# Patient Record
Sex: MALE | Race: WHITE | HISPANIC OR LATINO | Employment: OTHER | URBAN - METROPOLITAN AREA
[De-identification: names, ages, dates, MRNs, and addresses within clinical notes are randomized per-mention and may not be internally consistent; named-entity substitution may affect disease eponyms.]

---

## 2019-02-03 ENCOUNTER — OUTSIDE PLACE OF SERVICE (OUTPATIENT)
Dept: CARDIOLOGY | Facility: CLINIC | Age: 49
End: 2019-02-03
Payer: MEDICARE

## 2019-02-03 PROCEDURE — 93010 ELECTROCARDIOGRAM REPORT: CPT | Mod: S$GLB,,, | Performed by: INTERNAL MEDICINE

## 2019-02-03 PROCEDURE — 93010 PR ELECTROCARDIOGRAM REPORT: ICD-10-PCS | Mod: S$GLB,,, | Performed by: INTERNAL MEDICINE

## 2019-12-11 ENCOUNTER — HOSPITAL ENCOUNTER (INPATIENT)
Facility: HOSPITAL | Age: 49
LOS: 12 days | Discharge: HOME HEALTH | DRG: 637 | End: 2019-12-23
Attending: EMERGENCY MEDICINE | Admitting: ANESTHESIOLOGY
Payer: MEDICARE

## 2019-12-11 ENCOUNTER — OUTSIDE PLACE OF SERVICE (OUTPATIENT)
Dept: CARDIOLOGY | Facility: CLINIC | Age: 49
End: 2019-12-11
Payer: MEDICARE

## 2019-12-11 DIAGNOSIS — R45.1 AGITATION: ICD-10-CM

## 2019-12-11 DIAGNOSIS — I63.9 STROKE: ICD-10-CM

## 2019-12-11 DIAGNOSIS — N18.5 TYPE 2 DIABETES MELLITUS WITH STAGE 5 CHRONIC KIDNEY DISEASE NOT ON CHRONIC DIALYSIS, WITHOUT LONG-TERM CURRENT USE OF INSULIN: ICD-10-CM

## 2019-12-11 DIAGNOSIS — Z99.2 ESRD (END STAGE RENAL DISEASE) ON DIALYSIS: ICD-10-CM

## 2019-12-11 DIAGNOSIS — I10 ESSENTIAL HYPERTENSION: ICD-10-CM

## 2019-12-11 DIAGNOSIS — N18.6 ESRD (END STAGE RENAL DISEASE) ON DIALYSIS: ICD-10-CM

## 2019-12-11 DIAGNOSIS — Z78.9 IMPAIRED MOBILITY AND ADLS: ICD-10-CM

## 2019-12-11 DIAGNOSIS — E15: ICD-10-CM

## 2019-12-11 DIAGNOSIS — A49.02 MRSA (METHICILLIN RESISTANT STAPHYLOCOCCUS AUREUS) INFECTION: ICD-10-CM

## 2019-12-11 DIAGNOSIS — E87.5 HYPERKALEMIA: ICD-10-CM

## 2019-12-11 DIAGNOSIS — E11.22 TYPE 2 DIABETES MELLITUS WITH STAGE 5 CHRONIC KIDNEY DISEASE NOT ON CHRONIC DIALYSIS, WITHOUT LONG-TERM CURRENT USE OF INSULIN: ICD-10-CM

## 2019-12-11 DIAGNOSIS — Z74.09 IMPAIRED MOBILITY AND ADLS: ICD-10-CM

## 2019-12-11 DIAGNOSIS — I63.9 CEREBROVASCULAR ACCIDENT (CVA), UNSPECIFIED MECHANISM: ICD-10-CM

## 2019-12-11 DIAGNOSIS — G93.40 ACUTE ENCEPHALOPATHY: ICD-10-CM

## 2019-12-11 DIAGNOSIS — R41.82 ALTERED MENTAL STATUS: ICD-10-CM

## 2019-12-11 DIAGNOSIS — E87.20 METABOLIC ACIDOSIS: ICD-10-CM

## 2019-12-11 DIAGNOSIS — J96.01 ACUTE RESPIRATORY FAILURE WITH HYPOXIA: ICD-10-CM

## 2019-12-11 DIAGNOSIS — R41.82 ALTERED MENTAL STATUS, UNSPECIFIED ALTERED MENTAL STATUS TYPE: ICD-10-CM

## 2019-12-11 DIAGNOSIS — R40.0 SOMNOLENCE: ICD-10-CM

## 2019-12-11 DIAGNOSIS — E16.2 HYPOGLYCEMIA: Primary | ICD-10-CM

## 2019-12-11 DIAGNOSIS — R94.31 QT PROLONGATION: ICD-10-CM

## 2019-12-11 PROBLEM — E11.9 TYPE 2 DIABETES MELLITUS: Status: ACTIVE | Noted: 2019-12-11

## 2019-12-11 LAB
ABO + RH BLD: NORMAL
ABO + RH BLD: NORMAL
ALBUMIN SERPL BCP-MCNC: 3.5 G/DL (ref 3.5–5.2)
ALBUMIN SERPL BCP-MCNC: 3.9 G/DL (ref 3.5–5.2)
ALBUMIN SERPL BCP-MCNC: 4.3 G/DL (ref 3.5–5.2)
ALLENS TEST: ABNORMAL
ALLENS TEST: NORMAL
ALP SERPL-CCNC: 104 U/L (ref 55–135)
ALT SERPL W/O P-5'-P-CCNC: 17 U/L (ref 10–44)
ANION GAP SERPL CALC-SCNC: 11 MMOL/L (ref 8–16)
ANION GAP SERPL CALC-SCNC: 14 MMOL/L (ref 8–16)
ANION GAP SERPL CALC-SCNC: 15 MMOL/L (ref 8–16)
APTT BLDCRRT: 26.6 SEC (ref 21–32)
AST SERPL-CCNC: 16 U/L (ref 10–40)
BASOPHILS # BLD AUTO: 0.02 K/UL (ref 0–0.2)
BASOPHILS NFR BLD: 0.2 % (ref 0–1.9)
BILIRUB SERPL-MCNC: 0.9 MG/DL (ref 0.1–1)
BLD GP AB SCN CELLS X3 SERPL QL: NORMAL
BLD GP AB SCN CELLS X3 SERPL QL: NORMAL
BLOOD GROUP ANTIBODIES SERPL: NORMAL
BNP SERPL-MCNC: 1123 PG/ML (ref 0–99)
BUN SERPL-MCNC: 53 MG/DL (ref 6–20)
BUN SERPL-MCNC: 54 MG/DL (ref 6–20)
BUN SERPL-MCNC: 54 MG/DL (ref 6–20)
CALCIUM SERPL-MCNC: 7.8 MG/DL (ref 8.7–10.5)
CALCIUM SERPL-MCNC: 8.2 MG/DL (ref 8.7–10.5)
CALCIUM SERPL-MCNC: 8.5 MG/DL (ref 8.7–10.5)
CHLORIDE SERPL-SCNC: 102 MMOL/L (ref 95–110)
CHLORIDE SERPL-SCNC: 106 MMOL/L (ref 95–110)
CHLORIDE SERPL-SCNC: 98 MMOL/L (ref 95–110)
CHOLEST SERPL-MCNC: 141 MG/DL (ref 120–199)
CHOLEST SERPL-MCNC: 162 MG/DL (ref 120–199)
CHOLEST/HDLC SERPL: 3.4 {RATIO} (ref 2–5)
CHOLEST/HDLC SERPL: 3.4 {RATIO} (ref 2–5)
CO2 SERPL-SCNC: 22 MMOL/L (ref 23–29)
CO2 SERPL-SCNC: 23 MMOL/L (ref 23–29)
CO2 SERPL-SCNC: 25 MMOL/L (ref 23–29)
CREAT SERPL-MCNC: 10.2 MG/DL (ref 0.5–1.4)
CREAT SERPL-MCNC: 10.6 MG/DL (ref 0.5–1.4)
CREAT SERPL-MCNC: 9.9 MG/DL (ref 0.5–1.4)
DELSYS: ABNORMAL
DIFFERENTIAL METHOD: ABNORMAL
EOSINOPHIL # BLD AUTO: 0 K/UL (ref 0–0.5)
EOSINOPHIL NFR BLD: 0.2 % (ref 0–8)
ERYTHROCYTE [DISTWIDTH] IN BLOOD BY AUTOMATED COUNT: 14.2 % (ref 11.5–14.5)
ERYTHROCYTE [SEDIMENTATION RATE] IN BLOOD BY WESTERGREN METHOD: 16 MM/H
ERYTHROCYTE [SEDIMENTATION RATE] IN BLOOD BY WESTERGREN METHOD: 25 MM/H
ERYTHROCYTE [SEDIMENTATION RATE] IN BLOOD BY WESTERGREN METHOD: 25 MM/H
EST. GFR  (AFRICAN AMERICAN): 5.8 ML/MIN/1.73 M^2
EST. GFR  (AFRICAN AMERICAN): 6.1 ML/MIN/1.73 M^2
EST. GFR  (AFRICAN AMERICAN): 6.3 ML/MIN/1.73 M^2
EST. GFR  (NON AFRICAN AMERICAN): 5 ML/MIN/1.73 M^2
EST. GFR  (NON AFRICAN AMERICAN): 5.2 ML/MIN/1.73 M^2
EST. GFR  (NON AFRICAN AMERICAN): 5.4 ML/MIN/1.73 M^2
FIO2: 100
FIO2: 50
FIO2: 50
GLUCOSE SERPL-MCNC: 174 MG/DL (ref 70–110)
GLUCOSE SERPL-MCNC: 93 MG/DL (ref 70–110)
GLUCOSE SERPL-MCNC: <5 MG/DL (ref 70–110)
HCO3 UR-SCNC: 15.4 MMOL/L (ref 24–28)
HCO3 UR-SCNC: 25.3 MMOL/L (ref 24–28)
HCO3 UR-SCNC: 29.9 MMOL/L (ref 24–28)
HCO3 UR-SCNC: 30.5 MMOL/L (ref 24–28)
HCT VFR BLD AUTO: 27.5 % (ref 40–54)
HCT VFR BLD CALC: 19 %PCV (ref 36–54)
HCT VFR BLD CALC: 26 %PCV (ref 36–54)
HCT VFR BLD CALC: 27 %PCV (ref 36–54)
HCT VFR BLD CALC: 29 %PCV (ref 36–54)
HDLC SERPL-MCNC: 41 MG/DL (ref 40–75)
HDLC SERPL-MCNC: 48 MG/DL (ref 40–75)
HDLC SERPL: 29.1 % (ref 20–50)
HDLC SERPL: 29.6 % (ref 20–50)
HGB BLD-MCNC: 9 G/DL (ref 14–18)
IMM GRANULOCYTES # BLD AUTO: 0.05 K/UL (ref 0–0.04)
IMM GRANULOCYTES NFR BLD AUTO: 0.4 % (ref 0–0.5)
INR PPP: 1.1 (ref 0.8–1.2)
INR PPP: 1.1 (ref 0.8–1.2)
LDH SERPL L TO P-CCNC: 0.45 MMOL/L (ref 0.36–1.25)
LDLC SERPL CALC-MCNC: 83.2 MG/DL (ref 63–159)
LDLC SERPL CALC-MCNC: 93.2 MG/DL (ref 63–159)
LYMPHOCYTES # BLD AUTO: 0.4 K/UL (ref 1–4.8)
LYMPHOCYTES NFR BLD: 3.2 % (ref 18–48)
MCH RBC QN AUTO: 33.5 PG (ref 27–31)
MCHC RBC AUTO-ENTMCNC: 32.7 G/DL (ref 32–36)
MCV RBC AUTO: 102 FL (ref 82–98)
MIN VOL: 11
MIN VOL: 15.4
MODE: ABNORMAL
MONOCYTES # BLD AUTO: 0.9 K/UL (ref 0.3–1)
MONOCYTES NFR BLD: 7.4 % (ref 4–15)
NEUTROPHILS # BLD AUTO: 10.8 K/UL (ref 1.8–7.7)
NEUTROPHILS NFR BLD: 88.6 % (ref 38–73)
NONHDLC SERPL-MCNC: 100 MG/DL
NONHDLC SERPL-MCNC: 114 MG/DL
NRBC BLD-RTO: 0 /100 WBC
PCO2 BLDA: 29.4 MMHG (ref 35–45)
PCO2 BLDA: 38.6 MMHG (ref 35–45)
PCO2 BLDA: 40.1 MMHG (ref 35–45)
PCO2 BLDA: 43.5 MMHG (ref 35–45)
PEEP: 5
PH SMN: 7.33 [PH] (ref 7.35–7.45)
PH SMN: 7.41 [PH] (ref 7.35–7.45)
PH SMN: 7.45 [PH] (ref 7.35–7.45)
PH SMN: 7.5 [PH] (ref 7.35–7.45)
PHOSPHATE SERPL-MCNC: 5 MG/DL (ref 2.7–4.5)
PHOSPHATE SERPL-MCNC: 5.5 MG/DL (ref 2.7–4.5)
PLATELET # BLD AUTO: 141 K/UL (ref 150–350)
PMV BLD AUTO: 10.6 FL (ref 9.2–12.9)
PO2 BLDA: 163 MMHG (ref 80–100)
PO2 BLDA: 190 MMHG (ref 80–100)
PO2 BLDA: 418 MMHG (ref 80–100)
PO2 BLDA: 71 MMHG (ref 80–100)
POC BE: -11 MMOL/L
POC BE: 1 MMOL/L
POC BE: 7 MMOL/L
POC BE: 7 MMOL/L
POC IONIZED CALCIUM: 0.86 MMOL/L (ref 1.06–1.42)
POC IONIZED CALCIUM: 0.97 MMOL/L (ref 1.06–1.42)
POC IONIZED CALCIUM: 0.97 MMOL/L (ref 1.06–1.42)
POC IONIZED CALCIUM: 1.03 MMOL/L (ref 1.06–1.42)
POC SATURATED O2: 100 % (ref 95–100)
POC SATURATED O2: 100 % (ref 95–100)
POC SATURATED O2: 93 % (ref 95–100)
POC SATURATED O2: 99 % (ref 95–100)
POC TCO2: 16 MMOL/L (ref 23–27)
POC TCO2: 26 MMOL/L (ref 23–27)
POC TCO2: 31 MMOL/L (ref 23–27)
POC TCO2: 32 MMOL/L (ref 23–27)
POCT GLUCOSE: 110 MG/DL (ref 70–110)
POCT GLUCOSE: 131 MG/DL (ref 70–110)
POCT GLUCOSE: 142 MG/DL (ref 70–110)
POCT GLUCOSE: 145 MG/DL (ref 70–110)
POCT GLUCOSE: 151 MG/DL (ref 70–110)
POCT GLUCOSE: 153 MG/DL (ref 70–110)
POCT GLUCOSE: 157 MG/DL (ref 70–110)
POCT GLUCOSE: 170 MG/DL (ref 70–110)
POCT GLUCOSE: 44 MG/DL (ref 70–110)
POCT GLUCOSE: 95 MG/DL (ref 70–110)
POTASSIUM BLD-SCNC: 3.6 MMOL/L (ref 3.5–5.1)
POTASSIUM BLD-SCNC: 4.6 MMOL/L (ref 3.5–5.1)
POTASSIUM BLD-SCNC: 7.1 MMOL/L (ref 3.5–5.1)
POTASSIUM BLD-SCNC: 7.4 MMOL/L (ref 3.5–5.1)
POTASSIUM SERPL-SCNC: 6.2 MMOL/L (ref 3.5–5.1)
POTASSIUM SERPL-SCNC: 6.5 MMOL/L (ref 3.5–5.1)
POTASSIUM SERPL-SCNC: 7.5 MMOL/L (ref 3.5–5.1)
PROT SERPL-MCNC: 7.4 G/DL (ref 6–8.4)
PROTHROMBIN TIME: 10.9 SEC (ref 9–12.5)
PROTHROMBIN TIME: 11.1 SEC (ref 9–12.5)
PROVIDER CREDENTIALS: ABNORMAL
PROVIDER CREDENTIALS: NORMAL
RBC # BLD AUTO: 2.69 M/UL (ref 4.6–6.2)
SAMPLE: ABNORMAL
SAMPLE: NORMAL
SITE: ABNORMAL
SITE: NORMAL
SODIUM BLD-SCNC: 134 MMOL/L (ref 136–145)
SODIUM BLD-SCNC: 138 MMOL/L (ref 136–145)
SODIUM BLD-SCNC: 139 MMOL/L (ref 136–145)
SODIUM BLD-SCNC: 148 MMOL/L (ref 136–145)
SODIUM SERPL-SCNC: 134 MMOL/L (ref 136–145)
SODIUM SERPL-SCNC: 139 MMOL/L (ref 136–145)
SODIUM SERPL-SCNC: 143 MMOL/L (ref 136–145)
SP02: 100
SP02: 100
TRIGL SERPL-MCNC: 104 MG/DL (ref 30–150)
TRIGL SERPL-MCNC: 84 MG/DL (ref 30–150)
TROPONIN I SERPL DL<=0.01 NG/ML-MCNC: 0.19 NG/ML (ref 0–0.03)
TSH SERPL DL<=0.005 MIU/L-ACNC: 2.35 UIU/ML (ref 0.4–4)
TSH SERPL DL<=0.005 MIU/L-ACNC: 3.01 UIU/ML (ref 0.4–4)
VERBAL RESULT READBACK PERFORMED: YES
VERBAL RESULT READBACK PERFORMED: YES
VT: 420
VT: 420
VT: 450
WBC # BLD AUTO: 12.14 K/UL (ref 3.9–12.7)

## 2019-12-11 PROCEDURE — 85610 PROTHROMBIN TIME: CPT | Mod: 91

## 2019-12-11 PROCEDURE — 99291 CRITICAL CARE FIRST HOUR: CPT | Mod: 25,GC,, | Performed by: ANESTHESIOLOGY

## 2019-12-11 PROCEDURE — 99292 PR CRITICAL CARE, ADDL 30 MIN: ICD-10-PCS | Mod: 25,,, | Performed by: PHYSICIAN ASSISTANT

## 2019-12-11 PROCEDURE — 80069 RENAL FUNCTION PANEL: CPT

## 2019-12-11 PROCEDURE — 63600175 PHARM REV CODE 636 W HCPCS: Performed by: STUDENT IN AN ORGANIZED HEALTH CARE EDUCATION/TRAINING PROGRAM

## 2019-12-11 PROCEDURE — 87186 SC STD MICRODIL/AGAR DIL: CPT

## 2019-12-11 PROCEDURE — 94002 VENT MGMT INPAT INIT DAY: CPT

## 2019-12-11 PROCEDURE — 63600175 PHARM REV CODE 636 W HCPCS: Performed by: PHYSICIAN ASSISTANT

## 2019-12-11 PROCEDURE — 99233 PR SUBSEQUENT HOSPITAL CARE,LEVL III: ICD-10-PCS | Mod: GC,,, | Performed by: INTERNAL MEDICINE

## 2019-12-11 PROCEDURE — 82800 BLOOD PH: CPT

## 2019-12-11 PROCEDURE — 80053 COMPREHEN METABOLIC PANEL: CPT

## 2019-12-11 PROCEDURE — 80100014 HC HEMODIALYSIS 1:1

## 2019-12-11 PROCEDURE — 25000003 PHARM REV CODE 250: Performed by: STUDENT IN AN ORGANIZED HEALTH CARE EDUCATION/TRAINING PROGRAM

## 2019-12-11 PROCEDURE — 87077 CULTURE AEROBIC IDENTIFY: CPT

## 2019-12-11 PROCEDURE — 20000000 HC ICU ROOM

## 2019-12-11 PROCEDURE — 99223 PR INITIAL HOSPITAL CARE,LEVL III: ICD-10-PCS | Mod: ,,, | Performed by: PSYCHIATRY & NEUROLOGY

## 2019-12-11 PROCEDURE — 99291 PR CRITICAL CARE, E/M 30-74 MINUTES: ICD-10-PCS | Mod: 25,GC,, | Performed by: ANESTHESIOLOGY

## 2019-12-11 PROCEDURE — 94761 N-INVAS EAR/PLS OXIMETRY MLT: CPT

## 2019-12-11 PROCEDURE — 82803 BLOOD GASES ANY COMBINATION: CPT

## 2019-12-11 PROCEDURE — 99285 EMERGENCY DEPT VISIT HI MDM: CPT | Mod: 25

## 2019-12-11 PROCEDURE — 96374 THER/PROPH/DIAG INJ IV PUSH: CPT

## 2019-12-11 PROCEDURE — 82962 GLUCOSE BLOOD TEST: CPT

## 2019-12-11 PROCEDURE — 99283 EMERGENCY DEPT VISIT LOW MDM: CPT | Mod: ,,, | Performed by: EMERGENCY MEDICINE

## 2019-12-11 PROCEDURE — 82330 ASSAY OF CALCIUM: CPT

## 2019-12-11 PROCEDURE — 99900035 HC TECH TIME PER 15 MIN (STAT)

## 2019-12-11 PROCEDURE — 36620 ARTERIAL LINE: ICD-10-PCS | Mod: ,,, | Performed by: PHYSICIAN ASSISTANT

## 2019-12-11 PROCEDURE — 83880 ASSAY OF NATRIURETIC PEPTIDE: CPT

## 2019-12-11 PROCEDURE — 25000003 PHARM REV CODE 250: Performed by: EMERGENCY MEDICINE

## 2019-12-11 PROCEDURE — 86850 RBC ANTIBODY SCREEN: CPT | Mod: 91

## 2019-12-11 PROCEDURE — 99292 CRITICAL CARE ADDL 30 MIN: CPT | Mod: 25,,, | Performed by: PHYSICIAN ASSISTANT

## 2019-12-11 PROCEDURE — 27200966 HC CLOSED SUCTION SYSTEM

## 2019-12-11 PROCEDURE — 84295 ASSAY OF SERUM SODIUM: CPT

## 2019-12-11 PROCEDURE — 86850 RBC ANTIBODY SCREEN: CPT

## 2019-12-11 PROCEDURE — 25000003 PHARM REV CODE 250: Performed by: NURSE PRACTITIONER

## 2019-12-11 PROCEDURE — 94003 VENT MGMT INPAT SUBQ DAY: CPT

## 2019-12-11 PROCEDURE — 25500020 PHARM REV CODE 255: Performed by: EMERGENCY MEDICINE

## 2019-12-11 PROCEDURE — 85025 COMPLETE CBC W/AUTO DIFF WBC: CPT

## 2019-12-11 PROCEDURE — 63600175 PHARM REV CODE 636 W HCPCS

## 2019-12-11 PROCEDURE — 85730 THROMBOPLASTIN TIME PARTIAL: CPT

## 2019-12-11 PROCEDURE — 84484 ASSAY OF TROPONIN QUANT: CPT

## 2019-12-11 PROCEDURE — 99233 SBSQ HOSP IP/OBS HIGH 50: CPT | Mod: GC,,, | Performed by: INTERNAL MEDICINE

## 2019-12-11 PROCEDURE — 27000221 HC OXYGEN, UP TO 24 HOURS

## 2019-12-11 PROCEDURE — 87040 BLOOD CULTURE FOR BACTERIA: CPT | Mod: 59

## 2019-12-11 PROCEDURE — 85014 HEMATOCRIT: CPT

## 2019-12-11 PROCEDURE — 86870 RBC ANTIBODY IDENTIFICATION: CPT

## 2019-12-11 PROCEDURE — 80061 LIPID PANEL: CPT | Mod: 91

## 2019-12-11 PROCEDURE — 25000003 PHARM REV CODE 250: Performed by: ANESTHESIOLOGY

## 2019-12-11 PROCEDURE — 84132 ASSAY OF SERUM POTASSIUM: CPT

## 2019-12-11 PROCEDURE — 85610 PROTHROMBIN TIME: CPT

## 2019-12-11 PROCEDURE — 84443 ASSAY THYROID STIM HORMONE: CPT | Mod: 91

## 2019-12-11 PROCEDURE — 99900026 HC AIRWAY MAINTENANCE (STAT)

## 2019-12-11 PROCEDURE — 93010 EKG 12-LEAD: ICD-10-PCS | Mod: ,,, | Performed by: INTERNAL MEDICINE

## 2019-12-11 PROCEDURE — 25000242 PHARM REV CODE 250 ALT 637 W/ HCPCS: Performed by: PHYSICIAN ASSISTANT

## 2019-12-11 PROCEDURE — 93010 ELECTROCARDIOGRAM REPORT: CPT | Mod: ,,, | Performed by: INTERNAL MEDICINE

## 2019-12-11 PROCEDURE — 36620 INSERTION CATHETER ARTERY: CPT | Mod: ,,, | Performed by: PHYSICIAN ASSISTANT

## 2019-12-11 PROCEDURE — 93010 PR ELECTROCARDIOGRAM REPORT: ICD-10-PCS | Mod: ,,, | Performed by: INTERNAL MEDICINE

## 2019-12-11 PROCEDURE — 93005 ELECTROCARDIOGRAM TRACING: CPT

## 2019-12-11 PROCEDURE — 63600175 PHARM REV CODE 636 W HCPCS: Performed by: GENERAL PRACTICE

## 2019-12-11 PROCEDURE — 95951 PR EEG MONITORING/VIDEORECORD: ICD-10-PCS | Mod: 26,52,, | Performed by: PSYCHIATRY & NEUROLOGY

## 2019-12-11 PROCEDURE — 83036 HEMOGLOBIN GLYCOSYLATED A1C: CPT

## 2019-12-11 PROCEDURE — 99223 1ST HOSP IP/OBS HIGH 75: CPT | Mod: ,,, | Performed by: PSYCHIATRY & NEUROLOGY

## 2019-12-11 PROCEDURE — 87070 CULTURE OTHR SPECIMN AEROBIC: CPT

## 2019-12-11 PROCEDURE — 99283 PR EMERGENCY DEPT VISIT,LEVEL III: ICD-10-PCS | Mod: ,,, | Performed by: EMERGENCY MEDICINE

## 2019-12-11 PROCEDURE — 80061 LIPID PANEL: CPT

## 2019-12-11 PROCEDURE — 84443 ASSAY THYROID STIM HORMONE: CPT

## 2019-12-11 PROCEDURE — 95951 PR EEG MONITORING/VIDEORECORD: CPT | Mod: 26,52,, | Performed by: PSYCHIATRY & NEUROLOGY

## 2019-12-11 PROCEDURE — 25000003 PHARM REV CODE 250: Performed by: PHYSICIAN ASSISTANT

## 2019-12-11 PROCEDURE — 37799 UNLISTED PX VASCULAR SURGERY: CPT

## 2019-12-11 PROCEDURE — 87205 SMEAR GRAM STAIN: CPT

## 2019-12-11 RX ORDER — FENTANYL CITRATE 50 UG/ML
25 INJECTION, SOLUTION INTRAMUSCULAR; INTRAVENOUS ONCE
Status: COMPLETED | OUTPATIENT
Start: 2019-12-11 | End: 2019-12-11

## 2019-12-11 RX ORDER — MAGNESIUM SULFATE HEPTAHYDRATE 40 MG/ML
2 INJECTION, SOLUTION INTRAVENOUS
Status: DISCONTINUED | OUTPATIENT
Start: 2019-12-11 | End: 2019-12-11

## 2019-12-11 RX ORDER — ALBUTEROL SULFATE 2.5 MG/.5ML
20 SOLUTION RESPIRATORY (INHALATION) EVERY 4 HOURS PRN
Status: DISCONTINUED | OUTPATIENT
Start: 2019-12-11 | End: 2019-12-23 | Stop reason: HOSPADM

## 2019-12-11 RX ORDER — ATORVASTATIN CALCIUM 20 MG/1
40 TABLET, FILM COATED ORAL DAILY
Status: DISCONTINUED | OUTPATIENT
Start: 2019-12-12 | End: 2019-12-19

## 2019-12-11 RX ORDER — POTASSIUM CHLORIDE 7.45 MG/ML
80 INJECTION INTRAVENOUS
Status: DISCONTINUED | OUTPATIENT
Start: 2019-12-11 | End: 2019-12-11

## 2019-12-11 RX ORDER — HYDRALAZINE HYDROCHLORIDE 20 MG/ML
10 INJECTION INTRAMUSCULAR; INTRAVENOUS EVERY 6 HOURS PRN
Status: DISCONTINUED | OUTPATIENT
Start: 2019-12-11 | End: 2019-12-15

## 2019-12-11 RX ORDER — ACETAMINOPHEN 325 MG/1
650 TABLET ORAL EVERY 6 HOURS PRN
Status: DISCONTINUED | OUTPATIENT
Start: 2019-12-11 | End: 2019-12-23 | Stop reason: HOSPADM

## 2019-12-11 RX ORDER — GLUCAGON 1 MG
1 KIT INJECTION
Status: DISCONTINUED | OUTPATIENT
Start: 2019-12-11 | End: 2019-12-12

## 2019-12-11 RX ORDER — POTASSIUM CHLORIDE 7.45 MG/ML
40 INJECTION INTRAVENOUS
Status: DISCONTINUED | OUTPATIENT
Start: 2019-12-11 | End: 2019-12-11

## 2019-12-11 RX ORDER — INDOMETHACIN 25 MG/1
150 CAPSULE ORAL ONCE
Status: DISCONTINUED | OUTPATIENT
Start: 2019-12-11 | End: 2019-12-11

## 2019-12-11 RX ORDER — ALBUTEROL SULFATE 2.5 MG/.5ML
2.5 SOLUTION RESPIRATORY (INHALATION) ONCE
Status: COMPLETED | OUTPATIENT
Start: 2019-12-11 | End: 2019-12-11

## 2019-12-11 RX ORDER — POTASSIUM CHLORIDE 7.45 MG/ML
60 INJECTION INTRAVENOUS
Status: DISCONTINUED | OUTPATIENT
Start: 2019-12-11 | End: 2019-12-11

## 2019-12-11 RX ORDER — FOLIC ACID 1 MG/1
1 TABLET ORAL DAILY
COMMUNITY

## 2019-12-11 RX ORDER — LOSARTAN POTASSIUM 100 MG/1
100 TABLET ORAL DAILY
Refills: 1 | COMMUNITY
Start: 2019-10-22

## 2019-12-11 RX ORDER — DEXTROSE MONOHYDRATE 100 MG/ML
INJECTION, SOLUTION INTRAVENOUS CONTINUOUS
Status: DISCONTINUED | OUTPATIENT
Start: 2019-12-11 | End: 2019-12-12

## 2019-12-11 RX ORDER — MAGNESIUM SULFATE HEPTAHYDRATE 40 MG/ML
4 INJECTION, SOLUTION INTRAVENOUS
Status: DISCONTINUED | OUTPATIENT
Start: 2019-12-11 | End: 2019-12-11

## 2019-12-11 RX ORDER — FENTANYL CITRATE 50 UG/ML
50 INJECTION, SOLUTION INTRAMUSCULAR; INTRAVENOUS ONCE
Status: COMPLETED | OUTPATIENT
Start: 2019-12-11 | End: 2019-12-11

## 2019-12-11 RX ORDER — LORAZEPAM 0.5 MG/1
TABLET ORAL
Refills: 3 | COMMUNITY
Start: 2019-09-18

## 2019-12-11 RX ORDER — ATORVASTATIN CALCIUM 10 MG/1
40 TABLET, FILM COATED ORAL DAILY
Status: DISCONTINUED | OUTPATIENT
Start: 2019-12-12 | End: 2019-12-11

## 2019-12-11 RX ORDER — SODIUM CHLORIDE 0.9 % (FLUSH) 0.9 %
10 SYRINGE (ML) INJECTION
Status: DISCONTINUED | OUTPATIENT
Start: 2019-12-11 | End: 2019-12-23 | Stop reason: HOSPADM

## 2019-12-11 RX ORDER — AMOXICILLIN 250 MG
1 CAPSULE ORAL DAILY
Status: DISCONTINUED | OUTPATIENT
Start: 2019-12-12 | End: 2019-12-14

## 2019-12-11 RX ORDER — IBUPROFEN 200 MG
16 TABLET ORAL
Status: DISCONTINUED | OUTPATIENT
Start: 2019-12-11 | End: 2019-12-23 | Stop reason: HOSPADM

## 2019-12-11 RX ORDER — FENTANYL CITRATE 50 UG/ML
INJECTION, SOLUTION INTRAMUSCULAR; INTRAVENOUS
Status: COMPLETED
Start: 2019-12-11 | End: 2019-12-11

## 2019-12-11 RX ORDER — INDOMETHACIN 25 MG/1
150 CAPSULE ORAL ONCE
Status: COMPLETED | OUTPATIENT
Start: 2019-12-11 | End: 2019-12-11

## 2019-12-11 RX ORDER — SEVELAMER CARBONATE 800 MG/1
1600 TABLET, FILM COATED ORAL
Refills: 5 | COMMUNITY
Start: 2019-12-10

## 2019-12-11 RX ORDER — MIDAZOLAM HYDROCHLORIDE 1 MG/ML
2 INJECTION INTRAMUSCULAR; INTRAVENOUS ONCE
Status: COMPLETED | OUTPATIENT
Start: 2019-12-11 | End: 2019-12-11

## 2019-12-11 RX ORDER — SODIUM CHLORIDE 9 MG/ML
INJECTION, SOLUTION INTRAVENOUS ONCE
Status: COMPLETED | OUTPATIENT
Start: 2019-12-11 | End: 2019-12-11

## 2019-12-11 RX ORDER — NIFEDIPINE 30 MG/1
30 TABLET, EXTENDED RELEASE ORAL DAILY
Refills: 6 | COMMUNITY
Start: 2019-11-26

## 2019-12-11 RX ORDER — IBUPROFEN 200 MG
24 TABLET ORAL
Status: DISCONTINUED | OUTPATIENT
Start: 2019-12-11 | End: 2019-12-23 | Stop reason: HOSPADM

## 2019-12-11 RX ORDER — ALBUTEROL SULFATE 2.5 MG/.5ML
20 SOLUTION RESPIRATORY (INHALATION) EVERY 4 HOURS PRN
Status: DISCONTINUED | OUTPATIENT
Start: 2019-12-11 | End: 2019-12-11

## 2019-12-11 RX ORDER — HYDRALAZINE HYDROCHLORIDE 100 MG/1
100 TABLET, FILM COATED ORAL 2 TIMES DAILY
COMMUNITY

## 2019-12-11 RX ORDER — DEXTROSE MONOHYDRATE 100 MG/ML
INJECTION, SOLUTION INTRAVENOUS CONTINUOUS
Status: DISCONTINUED | OUTPATIENT
Start: 2019-12-11 | End: 2019-12-11

## 2019-12-11 RX ORDER — GLIPIZIDE 5 MG/1
5 TABLET ORAL DAILY
COMMUNITY
Start: 2019-12-10 | End: 2019-12-23 | Stop reason: HOSPADM

## 2019-12-11 RX ORDER — CLONIDINE HYDROCHLORIDE 0.1 MG/1
0.1 TABLET ORAL EVERY 8 HOURS PRN
Refills: 6 | COMMUNITY
Start: 2019-12-10

## 2019-12-11 RX ADMIN — DEXTROSE 250 ML: 10 SOLUTION INTRAVENOUS at 02:12

## 2019-12-11 RX ADMIN — DEXTROSE 50 % IN WATER (D50W) INTRAVENOUS SYRINGE 25 G: at 04:12

## 2019-12-11 RX ADMIN — IOHEXOL 100 ML: 350 INJECTION, SOLUTION INTRAVENOUS at 02:12

## 2019-12-11 RX ADMIN — SODIUM POLYSTYRENE SULFONATE 30 G: 15 SUSPENSION ORAL; RECTAL at 07:12

## 2019-12-11 RX ADMIN — DEXTROSE 250 ML: 10 SOLUTION INTRAVENOUS at 04:12

## 2019-12-11 RX ADMIN — ACETAMINOPHEN 650 MG: 325 TABLET ORAL at 08:12

## 2019-12-11 RX ADMIN — FENTANYL CITRATE 25 MCG: 50 INJECTION INTRAMUSCULAR; INTRAVENOUS at 06:12

## 2019-12-11 RX ADMIN — MIDAZOLAM HYDROCHLORIDE 2 MG: 1 INJECTION, SOLUTION INTRAMUSCULAR; INTRAVENOUS at 05:12

## 2019-12-11 RX ADMIN — ALBUTEROL SULFATE 20 MG: 2.5 SOLUTION RESPIRATORY (INHALATION) at 06:12

## 2019-12-11 RX ADMIN — SODIUM BICARBONATE 150 MEQ: 84 INJECTION, SOLUTION INTRAVENOUS at 02:12

## 2019-12-11 RX ADMIN — DEXTROSE 250 ML: 10 SOLUTION INTRAVENOUS at 01:12

## 2019-12-11 RX ADMIN — FENTANYL CITRATE 50 MCG: 50 INJECTION, SOLUTION INTRAMUSCULAR; INTRAVENOUS at 08:12

## 2019-12-11 RX ADMIN — FENTANYL CITRATE 50 MCG: 50 INJECTION INTRAMUSCULAR; INTRAVENOUS at 08:12

## 2019-12-11 RX ADMIN — CALCIUM GLUCONATE 1 G: 98 INJECTION, SOLUTION INTRAVENOUS at 06:12

## 2019-12-11 RX ADMIN — CALCIUM GLUCONATE 1000 MG: 98 INJECTION, SOLUTION INTRAVENOUS at 07:12

## 2019-12-11 RX ADMIN — SODIUM CHLORIDE: 0.9 INJECTION, SOLUTION INTRAVENOUS at 08:12

## 2019-12-11 RX ADMIN — HYDRALAZINE HYDROCHLORIDE 10 MG: 20 INJECTION INTRAMUSCULAR; INTRAVENOUS at 03:12

## 2019-12-11 RX ADMIN — FENTANYL CITRATE 25 MCG: 50 INJECTION, SOLUTION INTRAMUSCULAR; INTRAVENOUS at 06:12

## 2019-12-11 RX ADMIN — DEXTROSE: 10 SOLUTION INTRAVENOUS at 04:12

## 2019-12-11 NOTE — ASSESSMENT & PLAN NOTE
- Home glipizide held  - On admit glucose <5, started on D10 drip  - Hypoglycemic post K shifting with insulin at OSH prior to arrival   - POCT glucose q1 hour

## 2019-12-11 NOTE — ASSESSMENT & PLAN NOTE
- Present on admission  - OSH attempted to shift HyperK with insulin    - On arrival, pt with glucose <5  - D10 drip gtt stopped (12/12),   - on 12/12 Glucose (248)  - POCT glucose q4 hour and SSI   - Monitor CMP

## 2019-12-11 NOTE — ASSESSMENT & PLAN NOTE
- Home glipizide held  - on admit glucose <5, started on D10 drip d/t hypoglycemia   - D10 gtt stopped (12/12)   - (12/12) Glucose 248  - on SSI, POCT glucose q4hrs
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
## 2019-12-11 NOTE — ASSESSMENT & PLAN NOTE
49 year old male with HTN, DM2 and ESRD on HD who presented to OSH with acute onset L sided weakness and confusion.     - Neuro checks Q1hr   - Vital checks Q1hr   - Vascular Neurology  - CT Head WO OSH unremarkable for acute infarction or hemorrhage   - CTA Head/neck - no acute change, no high grade stenosis  - SBP <200 mmHg  - EKG, ECHO pending  - CMP daily, Replete electrolytes PRN  - SSI  - Q4 POCT Glucose checks  - on fentanyl gtt for acute agitation   - PT/OT/SLP when appropriate

## 2019-12-11 NOTE — ASSESSMENT & PLAN NOTE
Balta Lamb is a 49 y.o. male with past medical history of end-stage renal disease, HTN, DM, and traumatic brain injury who presented to outside hospital with AMS. Patient became combative and was later intubated and sedated. He was [place] [facility] [place] care.     On arrival [place] [facility], patient intubated and sedated on versed gtt with severe hypoglycemia and hyperkalemia. CTA without LVO or significant stenosis. Exam limited by sedation and patient's mental status. However, low suspicion for cerebrovascular origin for patient's symptoms. Suspect seizures vs metabolic source. Recommend MRI to definitively rule out stroke. Stroke team will sign off at this time. Please contact stroke team if MRI positive for stroke.

## 2019-12-11 NOTE — SUBJECTIVE & OBJECTIVE
No past medical history on file.    No past surgical history on file.    Review of patient's allergies indicates:  No Known Allergies  Current Facility-Administered Medications   Medication Frequency    0.9%  NaCl infusion Once    [START ON 12/12/2019] atorvastatin tablet 40 mg Daily    calcium gluconate 1g in dextrose 5% 100mL (ready to mix system) PRN    calcium gluconate 1g in dextrose 5% 100mL (ready to mix system) PRN    calcium gluconate 1g in dextrose 5% 100mL (ready to mix system) PRN    dextrose 10 % infusion Continuous    dextrose 10% (D10W) Bolus PRN    dextrose 10% (D10W) Bolus PRN    dextrose 50% injection 25 g Once    dextrose 50% injection 25 g PRN    glucagon (human recombinant) injection 1 mg PRN    glucose chewable tablet 16 g PRN    glucose chewable tablet 24 g PRN    hydrALAZINE injection 10 mg Q6H PRN    [START ON 12/12/2019] senna-docusate 8.6-50 mg per tablet 1 tablet Daily    sodium chloride 0.9% flush 10 mL PRN     Current Outpatient Medications   Medication    folic acid (FOLVITE) 1 MG tablet    hydrALAZINE (APRESOLINE) 100 MG tablet    losartan (COZAAR) 100 MG tablet    cloNIDine (CATAPRES) 0.1 MG tablet    glipiZIDE (GLUCOTROL) 5 MG tablet    LORazepam (ATIVAN) 0.5 MG tablet    NIFEdipine (PROCARDIA-XL) 30 MG (OSM) 24 hr tablet    RENVELA 800 mg Tab     Family History     None        Tobacco Use    Smoking status: Not on file   Substance and Sexual Activity    Alcohol use: Not on file    Drug use: Not on file    Sexual activity: Not on file     Review of Systems   Unable to perform ROS: Intubated     Objective:     Vital Signs (Most Recent):  Temp: 99.2 °F (37.3 °C) (12/11/19 1542)  Pulse: 90 (12/11/19 1647)  BP: (!) 170/81 (12/11/19 1647)  SpO2: 100 % (12/11/19 1647)  O2 Device (Oxygen Therapy): ventilator (12/11/19 1326) Vital Signs (24h Range):  Temp:  [97.4 °F (36.3 °C)-99.2 °F (37.3 °C)] 99.2 °F (37.3 °C)  Pulse:  [] 90  Resp:  [17-24] 17  SpO2:  [99  %-100 %] 100 %  BP: (159-214)/() 170/81     Weight: 77.6 kg (171 lb) (12/11/19 1336)  There is no height or weight on file to calculate BMI.  There is no height or weight on file to calculate BSA.    No intake/output data recorded.    Physical Exam   Constitutional: No distress.   Critically ill   HENT:   Head: Normocephalic and atraumatic.   Endotracheal tube in place   Eyes:   R pupil minimally responsive to light  L eye clouded, unable to assess    Neck: Neck supple.   Cardiovascular: Normal rate and regular rhythm.   Pulmonary/Chest:   Vent transmitted breath sounds   Abdominal: Soft. He exhibits no distension.   Musculoskeletal: He exhibits deformity (multiple toe amputation). He exhibits no edema.   LUE AVF +thrill/+bruit   Neurological:   Sedated   Skin: Skin is warm and dry. He is not diaphoretic. No erythema.   Trophic changes to extremities   Nursing note and vitals reviewed.      Significant Labs:  CBC:   Recent Labs   Lab 12/11/19  1333   HCT 19*     CMP:   Recent Labs   Lab 12/11/19  1406 12/11/19  1524   GLU <5* 93   CALCIUM 8.5* 8.2*   ALBUMIN 4.3 3.9   PROT 7.4  --     139   K 6.5* 6.2*   CO2 23 22*    102   BUN 54* 53*   CREATININE 10.2* 9.9*   ALKPHOS 104  --    ALT 17  --    AST 16  --    BILITOT 0.9  --      All labs within the past 24 hours have been reviewed.    Significant Imaging:  CXR personally reviewed.   Head CT scan reviewed.

## 2019-12-11 NOTE — ASSESSMENT & PLAN NOTE
End-Stage Renal Disease on HD  - Will provide dialysis for metabolic clearance and volume management  - Target ultrafiltration 1-2L as tolerated  - Dialysate will be adjusted to current labs   - Medications doses to renal parameters  - Strict Input and Output and chart  - Pre/post HD standing weights      Anemia of Chronic Kidney Disease   - Hb > 7 gm/dL  - Iron studies    Mineral Bone Disease in CKD   - Renal Function Panel Daily for electrolytes monitoring    HTN   - elevated BP, will continue to monitor. Goal for BP per primary team, but below 180 systolics for HD treatments.    Nutrition   - Renal Diet when able    Case discussed with staff further recs with attestation.
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
## 2019-12-11 NOTE — ASSESSMENT & PLAN NOTE
- Hypertensive on admit,   - Holding home antihypertensives  - PRN Hydralazine   - Goal SBP <200

## 2019-12-11 NOTE — ASSESSMENT & PLAN NOTE
Stroke risk factor  A1C pending  Glucose <20 on arrival after being treated for hyperkalemia  Management per primary team

## 2019-12-11 NOTE — ED NOTES
Pt with some spontaneous/non-purposeful movement of right upper and lower and left lower extremity.  NCC at bedside.

## 2019-12-11 NOTE — ED NOTES
Crit Care informed of glucose >20mg/dl.  Will give repeat bolus of D10 250cc to follow up by infusion of D10 @ 50cc/hr.
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
## 2019-12-11 NOTE — ASSESSMENT & PLAN NOTE
- Present on admission  - OSH attempted to shift HyperK with insulin   - On arrival, pt with glucose <5  - D10 drip until D50 available   - POCT glucose q1 hour and PRN  - Monitor CMP

## 2019-12-11 NOTE — SUBJECTIVE & OBJECTIVE
No past medical history on file.  No past surgical history on file.   No current facility-administered medications on file prior to encounter.      Current Outpatient Medications on File Prior to Encounter   Medication Sig Dispense Refill    folic acid (FOLVITE) 1 MG tablet Take 1 mg by mouth once daily.      hydrALAZINE (APRESOLINE) 100 MG tablet Take 100 mg by mouth 2 (two) times daily.      losartan (COZAAR) 100 MG tablet Take 100 mg by mouth once daily.  1    cloNIDine (CATAPRES) 0.1 MG tablet Take 0.1 mg by mouth 2 (two) times daily.  6    glipiZIDE (GLUCOTROL) 5 MG tablet Take 5 mg by mouth once daily.      LORazepam (ATIVAN) 0.5 MG tablet TAKE ONE TABLET BY MOUTH AS NEEDED BEFORE dialysis  3    NIFEdipine (PROCARDIA-XL) 30 MG (OSM) 24 hr tablet Take 30 mg by mouth once daily.  6    RENVELA 800 mg Tab TAKE TWO TABLETS BY MOUTH AFTER MEALS meals  5      Allergies: Patient has no known allergies.    No family history on file.  Social History     Tobacco Use    Smoking status: Not on file   Substance Use Topics    Alcohol use: Not on file    Drug use: Not on file     Review of Systems   Unable to perform ROS: Mental status change (intubated and sedated)     Objective:     Vitals:    Temp: 99.2 °F (37.3 °C)  Pulse: 80  BP: (!) 206/97  MAP (mmHg): 139  SpO2: 100 %  Oxygen Concentration (%): 100  O2 Device (Oxygen Therapy): ventilator  Vent Mode: A/C  Set Rate: 16 bmp  Vt Set: 450 mL  Pressure Support: 0 cmH20  PEEP/CPAP: 5 cmH20  Peak Airway Pressure: 23 cmH2O  Mean Airway Pressure: 9 cmH20  Plateau Pressure: 0 cmH20    Temp  Min: 97.4 °F (36.3 °C)  Max: 99.2 °F (37.3 °C)  Pulse  Min: 70  Max: 110  BP  Min: 159/65  Max: 214/98  MAP (mmHg)  Min: 112  Max: 140  Resp  Min: 17  Max: 24  SpO2  Min: 99 %  Max: 100 %  Oxygen Concentration (%)  Min: 50  Max: 100    No intake/output data recorded.           Physical Exam   Constitutional: No distress.   HENT:   Head: Normocephalic and atraumatic.   Eyes:   R  pupil minimally responsive to light  L eye clouded, unable to assess    Cardiovascular: Normal rate and regular rhythm.   HD fistula in LUE   Pulmonary/Chest:   Intubated and mechanically ventilated    Abdominal: Soft. He exhibits no distension.   Musculoskeletal: He exhibits no edema.   Neurological: He is unresponsive. GCS eye subscore is 1. GCS verbal subscore is 1. GCS motor subscore is 1.   Intubated and sedated on Versed drip  Not responsive to commands  Does not react in response to pain   Skin: Skin is warm and dry. He is not diaphoretic. No erythema.   Trophic changes to extremities   Nursing note and vitals reviewed.    Unable to test orientation, language, memory, judgment, insight, fund of knowledge, hearing, shoulder shrug, tongue protrusion, coordination, gait due to level of consciousness.    Today I personally reviewed pertinent medications, lines/drains/airways, imaging, cardiology results, laboratory results, microbiology results, notably:    CTA Multiphase 12/11/19  No acute hemorrhage or major vascular distribution infarct.    Small focus of apparent encephalomalacia in the posterior left frontal lobe subjacent to a craniotomy site.  Recommend correlation with clinical history and prior imaging.    Atherosclerotic calcification throughout the carotid and vertebral arteries as further discussed above.  No evidence high-grade stenosis or major vessel occlusion.

## 2019-12-11 NOTE — ASSESSMENT & PLAN NOTE
49 year old male with HTN, DM2 and ESRD on HD who presented to OSH with acute onset L sided weakness and confusion.     - CT Head WO OSH unremarkable for acute infarction or hemorrhage   - On arrival, intubated and sedated with versed drip  - Neuro exam limited due to sedation   - CTA Head/neck - no acute change, no high grade stenosis    Plan    Neuro:  - Q1h neurochecks while in ICU  - EEG pending  - Vascular Neurology consulted in ED  - Wean sedation as able to get neuro exam    CVS:  - SBP <200 mmHg  - EKG, ECHO pending  - A-line    Pulm:  - Intubated and mechanically ventilated   - Admit CXR unremarkable     GIT/Electrolytes:  - CMP daily, Replete electrolytes PRN  - NPO  - Q1 POCT Glucose checks, D10 drip     Renal:  - Strict I/Os  - Goal of euvolemia or net +1L    Heme/ID:  - Daily CBC, Transfuse PRN  - Trend WBC    Dispo: Admit to NICU at this time, PT/OT when appropriate, PM&R consulted, CM/SW consult for dispo planning

## 2019-12-11 NOTE — ASSESSMENT & PLAN NOTE
- HD fistula in LUE  - Cr 10.2  - Nephrology consulted, appreciate recs  - Renally dose medications

## 2019-12-11 NOTE — CONSULTS
[facility]  Nephrology  Consult Note    Patient Name: Balta Lamb  MRN: [id number]  Admission Date: 12/11/2019  Hospital Length of Stay: 0 days  Attending Provider: Román Denney, *   Primary Care Physician: No primary care provider on file.  Principal Problem:<principal problem not specified>    Inpatient consult to Nephrology  Consult performed by: Damian Pena MD  Consult ordered by: Luiz Johnson MD        Subjective:     HPI: 49 year old White male with ESRD on HD (unknown details/schedule or last HD treatment), HTN, DM2, transferred from OS and admitted to neurocritical care unit on 12/11/2019 per chart for neurologic evaluation of altered mental status, along with Lt sided numbness.  History is obtained from chart, no family members at bedside, patient sedated on mechanical ventilation.  Per chart, pt was driving with his wife when he noted acute onset left sided numbness.  Wife was able to pull car over and call 911. EMS found pt confused and combative. Pt was transported to OSH where CT Head showed no acute change and encephalomalacia in L frontal and L parietal region from prior craniotomy (unknown reason or diagnosis).  CTA Multiphase showed no acute hemorrhage or major vascular distribution infarct and no evidence high-grade stenosis or major vessel occlusion.  Patient hypoglycemic on arrival to [facility], started on D10 drip.    Nephrology consulted for evaluation of ESRD and HD treatments.    No past medical history on file.    No past surgical history on file.    Review of patient's allergies indicates:  No Known Allergies  Current Facility-Administered Medications   Medication Frequency    0.9%  NaCl infusion Once    [START ON 12/12/2019] atorvastatin tablet 40 mg Daily    calcium gluconate 1g in dextrose 5% 100mL (ready to mix system) PRN    calcium gluconate 1g in dextrose 5% 100mL (ready to mix system) PRN    calcium gluconate 1g in dextrose 5%  100mL (ready to mix system) PRN    dextrose 10 % infusion Continuous    dextrose 10% (D10W) Bolus PRN    dextrose 10% (D10W) Bolus PRN    dextrose 50% injection 25 g Once    dextrose 50% injection 25 g PRN    glucagon (human recombinant) injection 1 mg PRN    glucose chewable tablet 16 g PRN    glucose chewable tablet 24 g PRN    hydrALAZINE injection 10 mg Q6H PRN    [START ON 12/12/2019] senna-docusate 8.6-50 mg per tablet 1 tablet Daily    sodium chloride 0.9% flush 10 mL PRN     Current Outpatient Medications   Medication    folic acid (FOLVITE) 1 MG tablet    hydrALAZINE (APRESOLINE) 100 MG tablet    losartan (COZAAR) 100 MG tablet    cloNIDine (CATAPRES) 0.1 MG tablet    glipiZIDE (GLUCOTROL) 5 MG tablet    LORazepam (ATIVAN) 0.5 MG tablet    NIFEdipine (PROCARDIA-XL) 30 MG (OSM) 24 hr tablet    RENVELA 800 mg Tab     Family History     None        Tobacco Use    Smoking status: Not on file   Substance and Sexual Activity    Alcohol use: Not on file    Drug use: Not on file    Sexual activity: Not on file     Review of Systems   Unable to perform ROS: Intubated     Objective:     Vital Signs (Most Recent):  Temp: 99.2 °F (37.3 °C) (12/11/19 1542)  Pulse: 90 (12/11/19 1647)  BP: (!) 170/81 (12/11/19 1647)  SpO2: 100 % (12/11/19 1647)  O2 Device (Oxygen Therapy): ventilator (12/11/19 1326) Vital Signs (24h Range):  Temp:  [97.4 °F (36.3 °C)-99.2 °F (37.3 °C)] 99.2 °F (37.3 °C)  Pulse:  [] 90  Resp:  [17-24] 17  SpO2:  [99 %-100 %] 100 %  BP: (159-214)/() 170/81     Weight: 77.6 kg (171 lb) (12/11/19 1336)  There is no height or weight on file to calculate BMI.  There is no height or weight on file to calculate BSA.    No intake/output data recorded.    Physical Exam   Constitutional: No distress.   Critically ill   HENT:   Head: Normocephalic and atraumatic.   Endotracheal tube in place   Eyes:   R pupil minimally responsive to light  L eye clouded, unable to assess    Neck:  Neck supple.   Cardiovascular: Normal rate and regular rhythm.   Pulmonary/Chest:   Vent transmitted breath sounds   Abdominal: Soft. He exhibits no distension.   Musculoskeletal: He exhibits deformity (multiple toe amputation). He exhibits no edema.   LUE AVF +thrill/+bruit   Neurological:   Sedated   Skin: Skin is warm and dry. He is not diaphoretic. No erythema.   Trophic changes to extremities   Nursing note and vitals reviewed.      Significant Labs:  CBC:   Recent Labs   Lab 12/11/19  1333   HCT 19*     CMP:   Recent Labs   Lab 12/11/19  1406 12/11/19  1524   GLU <5* 93   CALCIUM 8.5* 8.2*   ALBUMIN 4.3 3.9   PROT 7.4  --     139   K 6.5* 6.2*   CO2 23 22*    102   BUN 54* 53*   CREATININE 10.2* 9.9*   ALKPHOS 104  --    ALT 17  --    AST 16  --    BILITOT 0.9  --      All labs within the past 24 hours have been reviewed.    Significant Imaging:  CXR personally reviewed.   Head CT scan reviewed.      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 Goal for BP per primary team, but below 180 systolics for HD treatments.    Nutrition   - Renal Diet when able    Case discussed with staff further recs with attestation.      Altered mental status  Per primary team        Thank you for your consult. I will follow-up with patient. Please contact us if you have any additional questions.    Damian Pena MD  Nephrology  [facility]

## 2019-12-11 NOTE — ED NOTES
LOC: Does not answer questions asked.  APPEARANCE: Patient intubated.  SKIN: The skin is warm and dry, color consistent with ethnicity, patient has normal skin turgor and moist mucus membranes, skin intact, no breakdown or bruising noted.  MUSCULOSKELETAL: Does not follow commands..  RESPIRATORY: Intubated.  Bilat breath sounds clear .  CARDIAC: Patient has a normal rate and regular rhythm, no periphreal edema noted..  ABDOMEN: Soft and non tender to palpation, no distention noted  NEUROLOGIC:  facial expression is symmetrical, intubated.

## 2019-12-11 NOTE — ASSESSMENT & PLAN NOTE
- HD fistula in LUE  - Cr 6.4, GFR 10.6   - Nephrology following   - Renally dose medications  - Strict I/Os

## 2019-12-11 NOTE — SUBJECTIVE & OBJECTIVE
No past medical history on file.  No past surgical history on file.  No family history on file.  Social History     Tobacco Use    Smoking status: Not on file   Substance Use Topics    Alcohol use: Not on file    Drug use: Not on file     Review of patient's allergies indicates:  Not on File    Medications: I have reviewed the current medication administration record.      (Not in a hospital admission)    Review of Systems   Constitutional: Negative for chills and fever.   Respiratory:        Intubated   Gastrointestinal: Negative for nausea and vomiting.   Neurological: Positive for speech difficulty, weakness and numbness.     Objective:     Vital Signs (Most Recent):  Pulse: 70 (12/11/19 1326)  BP: (!) 180/83 (12/11/19 1326)  SpO2: 100 % (12/11/19 1326)    Vital Signs Range (Last 24H):  Temp:  [97.4 °F (36.3 °C)]   Pulse:  []   Resp:  [17-24]   BP: (159-207)/()   SpO2:  [99 %-100 %]     Physical Exam   Constitutional: He appears well-developed and well-nourished. No distress.   +shivering   HENT:   Head: Normocephalic and atraumatic.   Cardiovascular: Normal rate.   Pulmonary/Chest: No respiratory distress.   intubated   Skin: Skin is warm and dry.   Vitals reviewed.      Neurological Exam:   LOC: obtunded  Attention Span: poor  Language: Intubated  Articulation: Untestable due to intubation  Orientation: Untestable due to intubation  Pupils (CN II, III): R pupil 2mm mildly reactive; cataract obstructing visualization of L pupil  Motor: Arm left  Plegia 0/5  Leg left  Plegia 0/5  Arm right  Plegia 0/5  Leg right Plegia 0/5  Sensation: no withdrawal to painful stimuli  Tone: increased tone in all 4 extremities with shivering      Laboratory:  CMP: No results for input(s): GLUCOSE, CALCIUM, ALBUMIN, PROT, NA, K, CO2, CL, BUN, CREATININE, ALKPHOS, ALT, AST, BILITOT in the last 24 hours.  CBC:   Recent Labs   Lab 12/11/19  1333   HCT 19*     Lipid Panel: No results for input(s): CHOL, LDLCALC, HDL,  TRIG in the last 168 hours.  Coagulation: No results for input(s): PT, INR, APTT in the last 168 hours.  Hgb A1C: No results for input(s): HGBA1C in the last 168 hours.  TSH: No results for input(s): TSH in the last 168 hours.    Diagnostic Results:      CTA Stroke Multiphase. Date: 12/11/19  Limited examination secondary to patient motion.    No acute hemorrhage or major vascular distribution infarct.    Small focus of apparent encephalomalacia in the posterior left frontal lobe subjacent to a craniotomy site.  Recommend correlation with clinical history and prior imaging.    Atherosclerotic calcification throughout the carotid and vertebral arteries as further discussed above.  No evidence high-grade stenosis or major vessel occlusion.

## 2019-12-12 PROBLEM — R45.1 AGITATION: Status: ACTIVE | Noted: 2019-12-12

## 2019-12-12 PROBLEM — Z99.11 ON MECHANICALLY ASSISTED VENTILATION: Status: ACTIVE | Noted: 2019-12-12

## 2019-12-12 LAB
ALBUMIN SERPL BCP-MCNC: 2.8 G/DL (ref 3.5–5.2)
ALBUMIN SERPL BCP-MCNC: 2.9 G/DL (ref 3.5–5.2)
ALBUMIN SERPL BCP-MCNC: 2.9 G/DL (ref 3.5–5.2)
ALBUMIN SERPL BCP-MCNC: 3.2 G/DL (ref 3.5–5.2)
ALBUMIN SERPL BCP-MCNC: 3.2 G/DL (ref 3.5–5.2)
ALBUMIN SERPL BCP-MCNC: 3.5 G/DL (ref 3.5–5.2)
ALLENS TEST: ABNORMAL
ALP SERPL-CCNC: 80 U/L (ref 55–135)
ALP SERPL-CCNC: 87 U/L (ref 55–135)
ALT SERPL W/O P-5'-P-CCNC: 14 U/L (ref 10–44)
ALT SERPL W/O P-5'-P-CCNC: 14 U/L (ref 10–44)
ANION GAP SERPL CALC-SCNC: 10 MMOL/L (ref 8–16)
ANION GAP SERPL CALC-SCNC: 12 MMOL/L (ref 8–16)
ANION GAP SERPL CALC-SCNC: 9 MMOL/L (ref 8–16)
ANION GAP SERPL CALC-SCNC: 9 MMOL/L (ref 8–16)
ASCENDING AORTA: 2.76 CM
AST SERPL-CCNC: 19 U/L (ref 10–40)
AST SERPL-CCNC: 19 U/L (ref 10–40)
AV INDEX (PROSTH): 0.71
AV MEAN GRADIENT: 4 MMHG
AV PEAK GRADIENT: 7 MMHG
AV VALVE AREA: 2.27 CM2
AV VELOCITY RATIO: 0.69
BASOPHILS # BLD AUTO: 0.02 K/UL (ref 0–0.2)
BASOPHILS NFR BLD: 0.2 % (ref 0–1.9)
BILIRUB SERPL-MCNC: 0.7 MG/DL (ref 0.1–1)
BILIRUB SERPL-MCNC: 1 MG/DL (ref 0.1–1)
BSA FOR ECHO PROCEDURE: 1.96 M2
BUN SERPL-MCNC: 21 MG/DL (ref 6–20)
BUN SERPL-MCNC: 23 MG/DL (ref 6–20)
BUN SERPL-MCNC: 23 MG/DL (ref 6–20)
BUN SERPL-MCNC: 25 MG/DL (ref 6–20)
BUN SERPL-MCNC: 27 MG/DL (ref 6–20)
BUN SERPL-MCNC: 29 MG/DL (ref 6–20)
CALCIUM SERPL-MCNC: 7.7 MG/DL (ref 8.7–10.5)
CALCIUM SERPL-MCNC: 7.8 MG/DL (ref 8.7–10.5)
CALCIUM SERPL-MCNC: 7.8 MG/DL (ref 8.7–10.5)
CALCIUM SERPL-MCNC: 8 MG/DL (ref 8.7–10.5)
CALCIUM SERPL-MCNC: 8 MG/DL (ref 8.7–10.5)
CALCIUM SERPL-MCNC: 8.3 MG/DL (ref 8.7–10.5)
CHLORIDE SERPL-SCNC: 100 MMOL/L (ref 95–110)
CHLORIDE SERPL-SCNC: 100 MMOL/L (ref 95–110)
CHLORIDE SERPL-SCNC: 99 MMOL/L (ref 95–110)
CO2 SERPL-SCNC: 26 MMOL/L (ref 23–29)
CO2 SERPL-SCNC: 28 MMOL/L (ref 23–29)
CO2 SERPL-SCNC: 28 MMOL/L (ref 23–29)
CREAT SERPL-MCNC: 5.1 MG/DL (ref 0.5–1.4)
CREAT SERPL-MCNC: 6 MG/DL (ref 0.5–1.4)
CREAT SERPL-MCNC: 6 MG/DL (ref 0.5–1.4)
CREAT SERPL-MCNC: 6.4 MG/DL (ref 0.5–1.4)
CREAT SERPL-MCNC: 6.7 MG/DL (ref 0.5–1.4)
CREAT SERPL-MCNC: 7 MG/DL (ref 0.5–1.4)
CV ECHO LV RWT: 0.43 CM
DELSYS: ABNORMAL
DIFFERENTIAL METHOD: ABNORMAL
DOP CALC AO PEAK VEL: 1.28 M/S
DOP CALC AO VTI: 32.55 CM
DOP CALC LVOT AREA: 3.2 CM2
DOP CALC LVOT DIAMETER: 2.02 CM
DOP CALC LVOT PEAK VEL: 0.88 M/S
DOP CALC LVOT STROKE VOLUME: 73.86 CM3
DOP CALCLVOT PEAK VEL VTI: 23.06 CM
E WAVE DECELERATION TIME: 335.46 MSEC
E/A RATIO: 1.18
E/E' RATIO: 11.76 M/S
ECHO LV POSTERIOR WALL: 1 CM (ref 0.6–1.1)
EOSINOPHIL # BLD AUTO: 0 K/UL (ref 0–0.5)
EOSINOPHIL NFR BLD: 0 % (ref 0–8)
ERYTHROCYTE [DISTWIDTH] IN BLOOD BY AUTOMATED COUNT: 14.2 % (ref 11.5–14.5)
ERYTHROCYTE [SEDIMENTATION RATE] IN BLOOD BY WESTERGREN METHOD: 25 MM/H
EST. GFR  (AFRICAN AMERICAN): 10.1 ML/MIN/1.73 M^2
EST. GFR  (AFRICAN AMERICAN): 10.6 ML/MIN/1.73 M^2
EST. GFR  (AFRICAN AMERICAN): 11.5 ML/MIN/1.73 M^2
EST. GFR  (AFRICAN AMERICAN): 11.5 ML/MIN/1.73 M^2
EST. GFR  (AFRICAN AMERICAN): 14 ML/MIN/1.73 M^2
EST. GFR  (AFRICAN AMERICAN): 9.5 ML/MIN/1.73 M^2
EST. GFR  (NON AFRICAN AMERICAN): 12.1 ML/MIN/1.73 M^2
EST. GFR  (NON AFRICAN AMERICAN): 8.3 ML/MIN/1.73 M^2
EST. GFR  (NON AFRICAN AMERICAN): 8.7 ML/MIN/1.73 M^2
EST. GFR  (NON AFRICAN AMERICAN): 9.2 ML/MIN/1.73 M^2
EST. GFR  (NON AFRICAN AMERICAN): 9.9 ML/MIN/1.73 M^2
EST. GFR  (NON AFRICAN AMERICAN): 9.9 ML/MIN/1.73 M^2
ESTIMATED AVG GLUCOSE: 169 MG/DL (ref 68–131)
ETHANOL SERPL-MCNC: <10 MG/DL
FIO2: 50
FRACTIONAL SHORTENING: 26 % (ref 28–44)
GLUCOSE SERPL-MCNC: 160 MG/DL (ref 70–110)
GLUCOSE SERPL-MCNC: 169 MG/DL (ref 70–110)
GLUCOSE SERPL-MCNC: 240 MG/DL (ref 70–110)
GLUCOSE SERPL-MCNC: 248 MG/DL (ref 70–110)
GLUCOSE SERPL-MCNC: 248 MG/DL (ref 70–110)
GLUCOSE SERPL-MCNC: 259 MG/DL (ref 70–110)
HBA1C MFR BLD HPLC: 7.5 % (ref 4–5.6)
HCO3 UR-SCNC: 29.8 MMOL/L (ref 24–28)
HCT VFR BLD AUTO: 26.8 % (ref 40–54)
HGB BLD-MCNC: 8.7 G/DL (ref 14–18)
IMM GRANULOCYTES # BLD AUTO: 0.04 K/UL (ref 0–0.04)
IMM GRANULOCYTES NFR BLD AUTO: 0.4 % (ref 0–0.5)
INTERVENTRICULAR SEPTUM: 1.2 CM (ref 0.6–1.1)
LA MAJOR: 5.58 CM
LA MINOR: 5.43 CM
LA WIDTH: 4.17 CM
LEFT ATRIUM SIZE: 4.2 CM
LEFT ATRIUM VOLUME INDEX: 42 ML/M2
LEFT ATRIUM VOLUME: 81.94 CM3
LEFT INTERNAL DIMENSION IN SYSTOLE: 3.4 CM (ref 2.1–4)
LEFT VENTRICLE DIASTOLIC VOLUME INDEX: 48.03 ML/M2
LEFT VENTRICLE DIASTOLIC VOLUME: 93.8 ML
LEFT VENTRICLE MASS INDEX: 93 G/M2
LEFT VENTRICLE SYSTOLIC VOLUME INDEX: 26.4 ML/M2
LEFT VENTRICLE SYSTOLIC VOLUME: 51.58 ML
LEFT VENTRICULAR INTERNAL DIMENSION IN DIASTOLE: 4.6 CM (ref 3.5–6)
LEFT VENTRICULAR MASS: 181.22 G
LV LATERAL E/E' RATIO: 11.11 M/S
LV SEPTAL E/E' RATIO: 12.5 M/S
LYMPHOCYTES # BLD AUTO: 0.4 K/UL (ref 1–4.8)
LYMPHOCYTES NFR BLD: 4 % (ref 18–48)
MAGNESIUM SERPL-MCNC: 2 MG/DL (ref 1.6–2.6)
MCH RBC QN AUTO: 34 PG (ref 27–31)
MCHC RBC AUTO-ENTMCNC: 32.5 G/DL (ref 32–36)
MCV RBC AUTO: 105 FL (ref 82–98)
MIN VOL: 10.9
MODE: ABNORMAL
MONOCYTES # BLD AUTO: 0.7 K/UL (ref 0.3–1)
MONOCYTES NFR BLD: 7.1 % (ref 4–15)
MV PEAK A VEL: 0.85 M/S
MV PEAK E VEL: 1 M/S
NEUTROPHILS # BLD AUTO: 8.2 K/UL (ref 1.8–7.7)
NEUTROPHILS NFR BLD: 88.3 % (ref 38–73)
NRBC BLD-RTO: 0 /100 WBC
PCO2 BLDA: 36.5 MMHG (ref 35–45)
PEEP: 5
PH SMN: 7.52 [PH] (ref 7.35–7.45)
PHOSPHATE SERPL-MCNC: 4.3 MG/DL (ref 2.7–4.5)
PHOSPHATE SERPL-MCNC: 4.5 MG/DL (ref 2.7–4.5)
PHOSPHATE SERPL-MCNC: 4.5 MG/DL (ref 2.7–4.5)
PHOSPHATE SERPL-MCNC: 5 MG/DL (ref 2.7–4.5)
PHOSPHATE SERPL-MCNC: 5.1 MG/DL (ref 2.7–4.5)
PISA TR MAX VEL: 2.19 M/S
PLATELET # BLD AUTO: 119 K/UL (ref 150–350)
PMV BLD AUTO: 10.5 FL (ref 9.2–12.9)
PO2 BLDA: 195 MMHG (ref 80–100)
POC BE: 7 MMOL/L
POC SATURATED O2: 100 % (ref 95–100)
POC TCO2: 31 MMOL/L (ref 23–27)
POCT GLUCOSE: 140 MG/DL (ref 70–110)
POCT GLUCOSE: 151 MG/DL (ref 70–110)
POCT GLUCOSE: 171 MG/DL (ref 70–110)
POCT GLUCOSE: 188 MG/DL (ref 70–110)
POCT GLUCOSE: 201 MG/DL (ref 70–110)
POCT GLUCOSE: 206 MG/DL (ref 70–110)
POCT GLUCOSE: 214 MG/DL (ref 70–110)
POCT GLUCOSE: 214 MG/DL (ref 70–110)
POCT GLUCOSE: 220 MG/DL (ref 70–110)
POCT GLUCOSE: 227 MG/DL (ref 70–110)
POCT GLUCOSE: 232 MG/DL (ref 70–110)
POCT GLUCOSE: 245 MG/DL (ref 70–110)
POCT GLUCOSE: 254 MG/DL (ref 70–110)
POCT GLUCOSE: <20 MG/DL (ref 70–110)
POTASSIUM SERPL-SCNC: 4.5 MMOL/L (ref 3.5–5.1)
POTASSIUM SERPL-SCNC: 4.5 MMOL/L (ref 3.5–5.1)
POTASSIUM SERPL-SCNC: 4.8 MMOL/L (ref 3.5–5.1)
POTASSIUM SERPL-SCNC: 4.9 MMOL/L (ref 3.5–5.1)
POTASSIUM SERPL-SCNC: 4.9 MMOL/L (ref 3.5–5.1)
POTASSIUM SERPL-SCNC: 5.3 MMOL/L (ref 3.5–5.1)
PROT SERPL-MCNC: 5.9 G/DL (ref 6–8.4)
PROT SERPL-MCNC: 6.4 G/DL (ref 6–8.4)
RA MAJOR: 4.58 CM
RA WIDTH: 3.44 CM
RBC # BLD AUTO: 2.56 M/UL (ref 4.6–6.2)
RETIRED EF AND QEF - SEE NOTES: 53 %
RIGHT VENTRICULAR END-DIASTOLIC DIMENSION: 3.4 CM
SAMPLE: ABNORMAL
SINUS: 2.65 CM
SITE: ABNORMAL
SODIUM SERPL-SCNC: 135 MMOL/L (ref 136–145)
SODIUM SERPL-SCNC: 135 MMOL/L (ref 136–145)
SODIUM SERPL-SCNC: 136 MMOL/L (ref 136–145)
SODIUM SERPL-SCNC: 138 MMOL/L (ref 136–145)
SP02: 100
STJ: 2.35 CM
TDI LATERAL: 0.09 M/S
TDI SEPTAL: 0.08 M/S
TDI: 0.09 M/S
TR MAX PG: 19 MMHG
TRICUSPID ANNULAR PLANE SYSTOLIC EXCURSION: 1.95 CM
VIT B12 SERPL-MCNC: 456 PG/ML (ref 210–950)
VT: 420
WBC # BLD AUTO: 9.32 K/UL (ref 3.9–12.7)

## 2019-12-12 PROCEDURE — 99232 SBSQ HOSP IP/OBS MODERATE 35: CPT | Mod: GC,,, | Performed by: INTERNAL MEDICINE

## 2019-12-12 PROCEDURE — 63600175 PHARM REV CODE 636 W HCPCS: Performed by: PSYCHIATRY & NEUROLOGY

## 2019-12-12 PROCEDURE — 25000003 PHARM REV CODE 250: Performed by: PSYCHIATRY & NEUROLOGY

## 2019-12-12 PROCEDURE — 99900035 HC TECH TIME PER 15 MIN (STAT)

## 2019-12-12 PROCEDURE — 76937 US GUIDE VASCULAR ACCESS: CPT

## 2019-12-12 PROCEDURE — 80069 RENAL FUNCTION PANEL: CPT

## 2019-12-12 PROCEDURE — 97110 THERAPEUTIC EXERCISES: CPT

## 2019-12-12 PROCEDURE — 84100 ASSAY OF PHOSPHORUS: CPT

## 2019-12-12 PROCEDURE — C1751 CATH, INF, PER/CENT/MIDLINE: HCPCS

## 2019-12-12 PROCEDURE — 63600175 PHARM REV CODE 636 W HCPCS

## 2019-12-12 PROCEDURE — 80053 COMPREHEN METABOLIC PANEL: CPT | Mod: 91

## 2019-12-12 PROCEDURE — 63600175 PHARM REV CODE 636 W HCPCS: Performed by: NURSE PRACTITIONER

## 2019-12-12 PROCEDURE — 94003 VENT MGMT INPAT SUBQ DAY: CPT

## 2019-12-12 PROCEDURE — 80320 DRUG SCREEN QUANTALCOHOLS: CPT

## 2019-12-12 PROCEDURE — 99291 CRITICAL CARE FIRST HOUR: CPT | Mod: GC,,, | Performed by: PSYCHIATRY & NEUROLOGY

## 2019-12-12 PROCEDURE — 25000003 PHARM REV CODE 250: Performed by: NURSE PRACTITIONER

## 2019-12-12 PROCEDURE — 83735 ASSAY OF MAGNESIUM: CPT

## 2019-12-12 PROCEDURE — 37799 UNLISTED PX VASCULAR SURGERY: CPT

## 2019-12-12 PROCEDURE — 36620 ARTERIAL LINE: ICD-10-PCS | Mod: ,,, | Performed by: NURSE PRACTITIONER

## 2019-12-12 PROCEDURE — 99232 PR SUBSEQUENT HOSPITAL CARE,LEVL II: ICD-10-PCS | Mod: GC,,, | Performed by: INTERNAL MEDICINE

## 2019-12-12 PROCEDURE — 27000221 HC OXYGEN, UP TO 24 HOURS

## 2019-12-12 PROCEDURE — 82803 BLOOD GASES ANY COMBINATION: CPT

## 2019-12-12 PROCEDURE — 94761 N-INVAS EAR/PLS OXIMETRY MLT: CPT

## 2019-12-12 PROCEDURE — 99900026 HC AIRWAY MAINTENANCE (STAT)

## 2019-12-12 PROCEDURE — 36410 VNPNXR 3YR/> PHY/QHP DX/THER: CPT

## 2019-12-12 PROCEDURE — 25000003 PHARM REV CODE 250: Performed by: PHYSICIAN ASSISTANT

## 2019-12-12 PROCEDURE — 25000003 PHARM REV CODE 250: Performed by: STUDENT IN AN ORGANIZED HEALTH CARE EDUCATION/TRAINING PROGRAM

## 2019-12-12 PROCEDURE — 97167 OT EVAL HIGH COMPLEX 60 MIN: CPT

## 2019-12-12 PROCEDURE — 36620 INSERTION CATHETER ARTERY: CPT | Mod: ,,, | Performed by: NURSE PRACTITIONER

## 2019-12-12 PROCEDURE — 80053 COMPREHEN METABOLIC PANEL: CPT

## 2019-12-12 PROCEDURE — 99291 PR CRITICAL CARE, E/M 30-74 MINUTES: ICD-10-PCS | Mod: GC,,, | Performed by: PSYCHIATRY & NEUROLOGY

## 2019-12-12 PROCEDURE — 85025 COMPLETE CBC W/AUTO DIFF WBC: CPT

## 2019-12-12 PROCEDURE — 82800 BLOOD PH: CPT

## 2019-12-12 PROCEDURE — 84425 ASSAY OF VITAMIN B-1: CPT

## 2019-12-12 PROCEDURE — 20000000 HC ICU ROOM

## 2019-12-12 PROCEDURE — 82607 VITAMIN B-12: CPT

## 2019-12-12 PROCEDURE — 80069 RENAL FUNCTION PANEL: CPT | Mod: 91

## 2019-12-12 RX ORDER — FENTANYL CITRATE 50 UG/ML
50 INJECTION, SOLUTION INTRAMUSCULAR; INTRAVENOUS ONCE
Status: COMPLETED | OUTPATIENT
Start: 2019-12-12 | End: 2019-12-12

## 2019-12-12 RX ORDER — HEPARIN SODIUM 5000 [USP'U]/ML
5000 INJECTION, SOLUTION INTRAVENOUS; SUBCUTANEOUS EVERY 8 HOURS
Status: DISCONTINUED | OUTPATIENT
Start: 2019-12-12 | End: 2019-12-23 | Stop reason: HOSPADM

## 2019-12-12 RX ORDER — FENTANYL CITRATE-0.9 % NACL/PF 10 MCG/ML
25 PLASTIC BAG, INJECTION (ML) INTRAVENOUS CONTINUOUS
Status: DISCONTINUED | OUTPATIENT
Start: 2019-12-12 | End: 2019-12-13

## 2019-12-12 RX ORDER — INSULIN ASPART 100 [IU]/ML
1-10 INJECTION, SOLUTION INTRAVENOUS; SUBCUTANEOUS EVERY 4 HOURS PRN
Status: DISCONTINUED | OUTPATIENT
Start: 2019-12-12 | End: 2019-12-19

## 2019-12-12 RX ORDER — FENTANYL CITRATE 50 UG/ML
75 INJECTION, SOLUTION INTRAMUSCULAR; INTRAVENOUS ONCE
Status: COMPLETED | OUTPATIENT
Start: 2019-12-12 | End: 2019-12-12

## 2019-12-12 RX ORDER — FENTANYL CITRATE 50 UG/ML
INJECTION, SOLUTION INTRAMUSCULAR; INTRAVENOUS
Status: COMPLETED
Start: 2019-12-12 | End: 2019-12-12

## 2019-12-12 RX ORDER — INSULIN ASPART 100 [IU]/ML
1-10 INJECTION, SOLUTION INTRAVENOUS; SUBCUTANEOUS EVERY 6 HOURS PRN
Status: DISCONTINUED | OUTPATIENT
Start: 2019-12-12 | End: 2019-12-12

## 2019-12-12 RX ORDER — DEXMEDETOMIDINE HYDROCHLORIDE 4 UG/ML
0.2 INJECTION, SOLUTION INTRAVENOUS CONTINUOUS
Status: DISCONTINUED | OUTPATIENT
Start: 2019-12-12 | End: 2019-12-12

## 2019-12-12 RX ORDER — FAMOTIDINE 20 MG/1
20 TABLET, FILM COATED ORAL DAILY
Status: DISCONTINUED | OUTPATIENT
Start: 2019-12-12 | End: 2019-12-15

## 2019-12-12 RX ORDER — CHLORHEXIDINE GLUCONATE ORAL RINSE 1.2 MG/ML
15 SOLUTION DENTAL 2 TIMES DAILY
Status: DISCONTINUED | OUTPATIENT
Start: 2019-12-12 | End: 2019-12-15

## 2019-12-12 RX ORDER — GLUCAGON 1 MG
1 KIT INJECTION
Status: DISCONTINUED | OUTPATIENT
Start: 2019-12-12 | End: 2019-12-23 | Stop reason: HOSPADM

## 2019-12-12 RX ORDER — GLUCAGON 1 MG
1 KIT INJECTION
Status: DISCONTINUED | OUTPATIENT
Start: 2019-12-12 | End: 2019-12-12

## 2019-12-12 RX ORDER — FENTANYL CITRATE 50 UG/ML
100 INJECTION, SOLUTION INTRAMUSCULAR; INTRAVENOUS ONCE
Status: DISCONTINUED | OUTPATIENT
Start: 2019-12-12 | End: 2019-12-15

## 2019-12-12 RX ORDER — FENTANYL CITRATE 50 UG/ML
50 INJECTION, SOLUTION INTRAMUSCULAR; INTRAVENOUS
Status: DISCONTINUED | OUTPATIENT
Start: 2019-12-12 | End: 2019-12-15

## 2019-12-12 RX ORDER — FENTANYL CITRATE-0.9 % NACL/PF 10 MCG/ML
25 PLASTIC BAG, INJECTION (ML) INTRAVENOUS CONTINUOUS
Status: DISCONTINUED | OUTPATIENT
Start: 2019-12-12 | End: 2019-12-12

## 2019-12-12 RX ORDER — DEXMEDETOMIDINE HYDROCHLORIDE 4 UG/ML
0.2 INJECTION, SOLUTION INTRAVENOUS CONTINUOUS
Status: DISCONTINUED | OUTPATIENT
Start: 2019-12-12 | End: 2019-12-15

## 2019-12-12 RX ADMIN — DEXTROSE: 10 SOLUTION INTRAVENOUS at 02:12

## 2019-12-12 RX ADMIN — DEXMEDETOMIDINE HYDROCHLORIDE 1 MCG/KG/HR: 4 INJECTION, SOLUTION INTRAVENOUS at 04:12

## 2019-12-12 RX ADMIN — DEXMEDETOMIDINE HYDROCHLORIDE 1 MCG/KG/HR: 4 INJECTION, SOLUTION INTRAVENOUS at 06:12

## 2019-12-12 RX ADMIN — ATORVASTATIN CALCIUM 40 MG: 20 TABLET, FILM COATED ORAL at 08:12

## 2019-12-12 RX ADMIN — FENTANYL CITRATE 50 MCG: 50 INJECTION INTRAMUSCULAR; INTRAVENOUS at 09:12

## 2019-12-12 RX ADMIN — FENTANYL CITRATE 50 MCG: 50 INJECTION INTRAMUSCULAR; INTRAVENOUS at 03:12

## 2019-12-12 RX ADMIN — DEXMEDETOMIDINE HYDROCHLORIDE 0.2 MCG/KG/HR: 4 INJECTION, SOLUTION INTRAVENOUS at 02:12

## 2019-12-12 RX ADMIN — FENTANYL CITRATE 100 MCG: 50 INJECTION INTRAMUSCULAR; INTRAVENOUS at 10:12

## 2019-12-12 RX ADMIN — SENNOSIDES AND DOCUSATE SODIUM 1 TABLET: 8.6; 5 TABLET ORAL at 08:12

## 2019-12-12 RX ADMIN — FENTANYL CITRATE 75 MCG: 50 INJECTION INTRAMUSCULAR; INTRAVENOUS at 12:12

## 2019-12-12 RX ADMIN — PIPERACILLIN AND TAZOBACTAM 4.5 G: 4; .5 INJECTION, POWDER, FOR SOLUTION INTRAVENOUS at 03:12

## 2019-12-12 RX ADMIN — FENTANYL CITRATE 50 MCG: 50 INJECTION INTRAMUSCULAR; INTRAVENOUS at 10:12

## 2019-12-12 RX ADMIN — DEXMEDETOMIDINE HYDROCHLORIDE 1 MCG/KG/HR: 4 INJECTION, SOLUTION INTRAVENOUS at 07:12

## 2019-12-12 RX ADMIN — INSULIN ASPART 1 UNITS: 100 INJECTION, SOLUTION INTRAVENOUS; SUBCUTANEOUS at 08:12

## 2019-12-12 RX ADMIN — CHLORHEXIDINE GLUCONATE 0.12% ORAL RINSE 15 ML: 1.2 LIQUID ORAL at 10:12

## 2019-12-12 RX ADMIN — SODIUM POLYSTYRENE SULFONATE 30 G: 15 SUSPENSION ORAL; RECTAL at 05:12

## 2019-12-12 RX ADMIN — FENTANYL CITRATE 50 MCG: 50 INJECTION INTRAMUSCULAR; INTRAVENOUS at 02:12

## 2019-12-12 RX ADMIN — FENTANYL CITRATE 50 MCG: 50 INJECTION, SOLUTION INTRAMUSCULAR; INTRAVENOUS at 03:12

## 2019-12-12 RX ADMIN — FENTANYL CITRATE 50 MCG: 50 INJECTION INTRAMUSCULAR; INTRAVENOUS at 07:12

## 2019-12-12 RX ADMIN — Medication 100 MCG/HR: at 11:12

## 2019-12-12 RX ADMIN — DEXMEDETOMIDINE HYDROCHLORIDE 0.6 MCG/KG/HR: 4 INJECTION, SOLUTION INTRAVENOUS at 11:12

## 2019-12-12 RX ADMIN — CHLORHEXIDINE GLUCONATE 0.12% ORAL RINSE 15 ML: 1.2 LIQUID ORAL at 08:12

## 2019-12-12 RX ADMIN — VANCOMYCIN HYDROCHLORIDE 1500 MG: 1.5 INJECTION, POWDER, LYOPHILIZED, FOR SOLUTION INTRAVENOUS at 04:12

## 2019-12-12 RX ADMIN — FENTANYL CITRATE 50 MCG: 50 INJECTION, SOLUTION INTRAMUSCULAR; INTRAVENOUS at 02:12

## 2019-12-12 RX ADMIN — DEXMEDETOMIDINE HYDROCHLORIDE 0.8 MCG/KG/HR: 4 INJECTION, SOLUTION INTRAVENOUS at 11:12

## 2019-12-12 RX ADMIN — HEPARIN SODIUM 5000 UNITS: 5000 INJECTION, SOLUTION INTRAVENOUS; SUBCUTANEOUS at 09:12

## 2019-12-12 RX ADMIN — FAMOTIDINE 20 MG: 20 TABLET ORAL at 10:12

## 2019-12-12 RX ADMIN — HEPARIN SODIUM 5000 UNITS: 5000 INJECTION, SOLUTION INTRAVENOUS; SUBCUTANEOUS at 10:12

## 2019-12-12 NOTE — PLAN OF CARE
OT evaluation initiated.  KYLER Ochoa  12/12/2019     Problem: Occupational Therapy Goal  Goal: Occupational Therapy Goal  Description  Goals set 12/12 to be addressed for 14 days with expiration date, 12/26:  Patient will increase functional independence with ADLs by performing:    Patient will demonstrate rolling to the right with mod assist.  Not met   Patient will demonstrate rolling to the left with mod assist.   Not met  Patient will demonstrate supine -sit with max assist.   Not met  Patient will demonstrate stand pivot transfers with mod assist.   Not met  Patient will demonstrate grooming while seated with max assist.   Not met  Patient will demonstrate upper body dressing with max assist while seated EOB.   Not met  Patient will demonstrate lower body dressing with max assist while seated EOB.   Not met  Patient will demonstrate toileting with max assist.   Not met  Patient will demonstrate bathing while seated EOB with max assist.   Not met  Patient's family / caregiver will demonstrate independence and safety with assisting patient with self-care skills and functional mobility.     Not met  Patient's family / caregiver will demonstrate independence with providing ROM and changes in bed positioning.   Not met     Outcome: Ongoing, Progressing

## 2019-12-12 NOTE — PROGRESS NOTES
Arrived for 3 hour dialysis tx. Pt on vent and does not respond to verbal stemuli. Left upper arm fistula with good thrill and bruit. Cannulated fistula X2 without difficulty. Will plan for 3 hr tx to remove 2 liters of fluid.

## 2019-12-12 NOTE — PLAN OF CARE
Attempted to meet with patient and or family in room for discharge planning assessment. Pt unable to answer questions 2/2 intubated on vent .  No family present in room.  Phone call to patient's son, Codey Lamb, [phone], listed as contact.  Per son, the patent resides with his girlfriend, Arabella, in a mobile home with 3 steps to enter.  Per son the patient has a cane and an electric wheelchair that he uses as needed.  Patient was independent with adls per son.   Son stated that he does not think that the patient is  to Arabella.  Per son, patient gets dialysis at a facility in [place] near his home M-W-F.  CM phoned [facility], Address: [address] [place] [address], [place] [address]  Phone: [phone].   Facility verified that patient is a dialysis patient and that his Nephrologist is Dr. Malini Beaver  [phone].    Per son, the patient's family is in [place] (son lives in [place]) and they would like to transfer him to a facility in [place].  CM informed son that the cost of the flight is not covered by Medicare and will be at least several thousand dollars or more.  Son also informed that if Arabella is  to the patient, she has to agree to the transfer because she would be his decision maker.  Per son, he does not feel that they are , but will check.   Son stated that Arabella is leaving the country next week and the patient will be alone.  CM informed son that patient would have to be medically stable and have an accepting facility.  CM again reinforced that if patient is , his wife would be his decision maker.  CM asked son if any family members would be coming to the hospital.  Son stated that they are talking and trying to arrange something.    Son stated that he will call back when he has more information.      ANDRA phoned:  Arabella Poole Home Phone: [phone].  No answer.  Unidentified voicemail.  No msg left.      12/12/19 2372   Discharge  Assessment   Assessment Type Discharge Planning Assessment   Confirmed/corrected address and phone number on facesheet? Yes   Assessment information obtained from? Caregiver  (son, Codey [phone])   Expected Length of Stay (days) 5   Communicated expected length of stay with patient/caregiver yes   Prior to hospitilization cognitive status: Alert/Oriented   Prior to hospitalization functional status: Independent;Assistive Equipment   Current cognitive status: Coma/Sedated/Intubated   Current Functional Status: Completely Dependent   Facility Arrived From: [facility]    Lives With significant other   Able to Return to Prior Arrangements other (see comments)  (carola)   Is patient able to care for self after discharge? Unable to determine at this time (comments)   Who are your caregiver(s) and their phone number(s)? Codey Lamb [phone]   Patient's perception of discharge disposition other (comments)  (carola)   Readmission Within the Last 30 Days no previous admission in last 30 days   Patient currently being followed by outpatient case management? No   Patient currently receives any other outside agency services? No   Equipment Currently Used at Home power chair;cane, straight   Do you have any problems affording any of your prescribed medications? TBD   Is the patient taking medications as prescribed?   (unknown)   Does the patient have transportation home? Yes   Transportation Anticipated family or friend will provide   Dialysis Name and Scheduled days [facility], Address: [address] [place] [id number] [phone]  MWF   Does the patient receive services at the [facility]? No   Discharge Plan A Long-term acute care facility (LTAC)   Discharge Plan B Rehab   DME Needed Upon Discharge  other (see comments)  (tbd)   Patient/Family in Agreement with Plan yes         PCP:  None   Nephrologist:  Malini Kelly MD  [address] [facility] [address] [facility]*    [phone]      Pharmacy:  Son does not know    Emergency Contacts:  Extended Emergency Contact Information  Primary Emergency Contact: Arabella Poole  Home Phone: [phone]  Relation: Spouse  Preferred language: Lao   needed? No  Secondary Emergency Contact: Susan Gaspar  Home Phone: [phone]  Relation: Sister  Preferred language: English   needed? No    Contact: Codey (son) [phone]; Gabi (step-daughter) [phone]      Insurance:    Payor: MEDICARE / Plan: MEDICARE PART A & B / Product Type: Government /       Mandie Buchanan RN, CCRN-K, CCM  Neuro-Critical Care   X [phone]

## 2019-12-12 NOTE — ED NOTES
Pt's daughter, Gabi ph # [phone] called for update, informed, states that she cannot come to ED but will call for updates.

## 2019-12-12 NOTE — PLAN OF CARE
POC reviewed with pt and family at 1500. Pt intubated and sedated and unable to verbalize understanding. Pt's family updated via telephone and family verbalizes understanding. Questions and concerns addressed. Precedex and fentanyl gtts remain. TF started at 10cc/hr. Restraints continued. Will continue to monitor. See flowsheets for full assessment and VS info.

## 2019-12-12 NOTE — CARE UPDATE
Received patient from ED, patient is intubated with a 7.5 ETT 23cm at the lip; ETT is secure and patent. Will continue to monitor.

## 2019-12-12 NOTE — SUBJECTIVE & OBJECTIVE
Interval History: Patient evaluated bedside, critically ill, sedated on mechanical ventilation.  Underwent HD treatment yesterday tolerated well, good clearance.  Night uneventful.    Review of patient's allergies indicates:  No Known Allergies  Current Facility-Administered Medications   Medication Frequency    acetaminophen tablet 650 mg Q6H PRN    albuterol sulfate nebulizer solution 20 mg Q4H PRN    atorvastatin tablet 40 mg Daily    calcium gluconate 1g in dextrose 5% 100mL (ready to mix system) PRN    calcium gluconate 1g in dextrose 5% 100mL (ready to mix system) PRN    calcium gluconate 1g in dextrose 5% 100mL (ready to mix system) PRN    dexmedetomidine (PRECEDEX) 400mcg/100mL 0.9% NaCL infusion Continuous    dextrose 10% (D10W) Bolus PRN    dextrose 10% (D10W) Bolus PRN    dextrose 50% injection 25 g PRN    fentaNYL injection 50 mcg Q2H PRN    glucagon (human recombinant) injection 1 mg PRN    glucose chewable tablet 16 g PRN    glucose chewable tablet 24 g PRN    hydrALAZINE injection 10 mg Q6H PRN    piperacillin-tazobactam 4.5 g in sodium chloride 0.9% 100 mL IVPB (ready to mix system) Q12H    senna-docusate 8.6-50 mg per tablet 1 tablet Daily    sodium chloride 0.9% flush 10 mL PRN    vancomycin - pharmacy to dose pharmacy to manage frequency       Objective:     Vital Signs (Most Recent):  Temp: 96.6 °F (35.9 °C) (12/12/19 0705)  Pulse: 66 (12/12/19 0907)  Resp: (!) 25 (12/12/19 0907)  BP: (!) 185/84 (12/12/19 0700)  SpO2: 100 % (12/12/19 0907)  O2 Device (Oxygen Therapy): ventilator (12/12/19 0907) Vital Signs (24h Range):  Temp:  [96.6 °F (35.9 °C)-102.8 °F (39.3 °C)] 96.6 °F (35.9 °C)  Pulse:  [] 66  Resp:  [17-57] 25  SpO2:  [99 %-100 %] 100 %  BP: (122-258)/() 185/84  Arterial Line BP: (122-206)/(49-75) 165/57     Weight: 77.6 kg (171 lb 1.2 oz) (12/11/19 6598)  Body mass index is 25.26 kg/m².  Body surface area is 1.94 meters squared.    I/O last 3 completed  shifts:  In: 2100.3 [I.V.:650.3; Other:500; IV Piggyback:950]  Out: 2500 [Other:2500]    Physical Exam   Constitutional: No distress.   Critically ill   HENT:   Head: Normocephalic and atraumatic.   Endotracheal tube in place   Neck: Neck supple.   Cardiovascular: Normal rate and regular rhythm.   Pulmonary/Chest:   Vent transmitted breath sounds   Abdominal: Soft. He exhibits no distension.   Musculoskeletal: He exhibits deformity (multiple toe amputation). He exhibits no edema.   LUE AVF +thrill/+bruit   Neurological:   Sedated   Skin: Skin is warm and dry. He is not diaphoretic. No erythema.   Trophic changes to extremities   Nursing note and vitals reviewed.      Significant Labs:  CBC:   Recent Labs   Lab 12/12/19  0321   WBC 9.32   RBC 2.56*   HGB 8.7*   HCT 26.8*   *   *   MCH 34.0*   MCHC 32.5     CMP:   Recent Labs   Lab 12/12/19  0321   *  248*   CALCIUM 8.0*  8.0*   ALBUMIN 3.2*  3.2*   PROT 5.9*     136   K 4.9  4.9   CO2 28  28   CL 99  99   BUN 23*  23*   CREATININE 6.0*  6.0*   ALKPHOS 80   ALT 14   AST 19   BILITOT 0.7     All labs within the past 24 hours have been reviewed.

## 2019-12-12 NOTE — SUBJECTIVE & OBJECTIVE
Interval History:    -Echo  - Sliding scale moderate dose  - DVT ppx   - Agitated on precedex, requiring fentanyl q2: will reassess agitation later in the day regarding need for fentanyl drip.    - Started TF  -Monitor Glucose   Review of Systems   Unable to perform ROS: Intubated     Objective:     Vitals:  Temp: 96.5 °F (35.8 °C)  Pulse: 60  Rhythm: normal sinus rhythm  BP: (!) 143/55  MAP (mmHg): 79  Resp: (!) 25  SpO2: 100 %  Oxygen Concentration (%): 50  O2 Device (Oxygen Therapy): ventilator  Vent Mode: A/C  Set Rate: 25 bmp  Vt Set: 420 mL  PEEP/CPAP: 5 cmH20  Peak Airway Pressure: 21 cmH2O  Mean Airway Pressure: 9.5 cmH20  Plateau Pressure: 0 cmH20    Temp  Min: 96.5 °F (35.8 °C)  Max: 102.8 °F (39.3 °C)  Pulse  Min: 60  Max: 112  BP  Min: 122/59  Max: 258/131  MAP (mmHg)  Min: 79  Max: 165  Resp  Min: 20  Max: 57  SpO2  Min: 99 %  Max: 100 %  Oxygen Concentration (%)  Min: 50  Max: 100    12/11 0701 - 12/12 0700  In: 2100.3 [I.V.:650.3]  Out: 2500            Physical Exam   Constitutional: He appears well-developed and well-nourished. He is intubated.   HENT:   Head: Normocephalic and atraumatic.   Cardiovascular: Normal rate and regular rhythm.   Pulmonary/Chest: He is intubated.   Abdominal: Soft. Normal appearance and bowel sounds are normal.   Neurological:   Awake, does not follow commands   E1V(T1)M4 GCS (5)   R pupil nonresponive to light   L eye cloudy   Acute agitation   SAUCEDO spontaneously and against gravity        Medications:  Continuous  dexmedetomidine (PRECEDEX) infusion Last Rate: 0.6 mcg/kg/hr (12/12/19 1205)   fentanyl Last Rate: 100 mcg/hr (12/12/19 1205)   Scheduled  atorvastatin 40 mg Daily   chlorhexidine 15 mL BID   famotidine 20 mg Daily   fentaNYL 100 mcg Once   heparin (porcine) 5,000 Units Q8H   piperacillin-tazobactam 4.5 g in sodium chloride 0.9% 100 mL IVPB (ready to mix system) 4.5 g Q12H   senna-docusate 8.6-50 mg 1 tablet Daily   PRN  acetaminophen 650 mg Q6H PRN   albuterol  sulfate 20 mg Q4H PRN   calcium gluconate IVPB 1 g PRN   calcium gluconate IVPB 1 g PRN   calcium gluconate IVPB 1 g PRN   Dextrose 10% Bolus 12.5 g PRN   Dextrose 10% Bolus 25 g PRN   dextrose 50% 25 g PRN   fentaNYL 50 mcg Q2H PRN   glucagon (human recombinant) 1 mg PRN   glucose 16 g PRN   glucose 24 g PRN   hydrALAZINE 10 mg Q6H PRN   insulin aspart U-100 1-10 Units Q4H PRN   sodium chloride 0.9% 10 mL PRN   vancomycin - pharmacy to dose  pharmacy to manage frequency     Today I personally reviewed pertinent medications, lines/drains/airways, imaging, cardiology results, laboratory results, microbiology results, notably:    Diet  Diet NPO  Diet NPO

## 2019-12-12 NOTE — PLAN OF CARE
POC reviewed with pt at 0400. Pt unable to verbalize understanding. Precedex gtt started and fentanyl given for agitation and combativeness. New art line placed in R brachial. Blood cultures x 2 and respiratory culture. T-max 101.9.  Antibiotics started. Hourly BG monitoring. D10 off.  HD complete. Will continue to monitor. See flowsheets for full assessment and VS info          Problem: Hemodynamic Instability (Hemodialysis)  Goal: Vital Signs Remain in Desired Range  Outcome: Ongoing, Progressing  Intervention: Optimize Blood Flow  Flowsheets (Taken 12/12/2019 0415)  Medication Review/Management: medications reviewed     Problem: Diabetes Comorbidity  Goal: Blood Glucose Level Within Desired Range  Outcome: Ongoing, Progressing  Intervention: Maintain Glycemic Control  Flowsheets (Taken 12/12/2019 0415)  Glycemic Management: blood glucose monitoring

## 2019-12-12 NOTE — ED NOTES
Pt with spontaneous movements of extremities, does not follow commands.  Orders received by Dr. Dubois who is presently at bedside.

## 2019-12-12 NOTE — PT/OT/SLP EVAL
Occupational Therapy   Evaluation    Name: Balta Lamb  MRN: [id number]  Admitting Diagnosis:  AMS      Recommendations:     Discharge Recommendations: (pending extubation)  Discharge Equipment Recommendations:  (pending extubation)  Barriers to discharge:  Inaccessible home environment, Decreased caregiver support    Assessment:     Balta Lamb is a 49 y.o. male with a medical diagnosis of <principal problem not specified>.  He presents with performance deficits affecting function: weakness, impaired self care skills, impaired balance, decreased coordination, decreased safety awareness, decreased ROM, impaired coordination, decreased upper extremity function, visual deficits, impaired functional mobilty, impaired endurance, impaired sensation, gait instability, impaired cognition, decreased lower extremity function, abnormal tone, impaired fine motor, impaired cardiopulmonary response to activity.      Rehab Prognosis: Good; patient would benefit from acute skilled OT services to address these deficits and reach maximum level of function.       Plan:     Patient to be seen 3 x/week to address the above listed problems via self-care/home management, neuromuscular re-education, cognitive retraining, therapeutic activities, therapeutic exercises, sensory integration  · Plan of Care Expires: 01/09/20  · Plan of Care Reviewed with: patient    Subjective     Patient:  Nonverbal; intubated    Occupational Profile:  Per chart review, patient resides in [place] with wife.  Patient currently intubated and family not present to obtain further information.  Son:  Codey [phone].  Attempted to contact son, however no answer.     Pain/Comfort:  · Pain Rating 1: 0/10  · Pain Rating Post-Intervention 1: 0/10    Patients cultural, spiritual, Temple conflicts given the current situation: no    Objective:     Communicated with: Nurse prior to session.  Patient found supine with ventilator, telemetry, SCD, restraints,  pulse ox (continuous), peripheral IV, pressure relief boots, EEG, blood pressure cuff, bed alarm, arterial line(OG tube) upon OT entry to room.    General Precautions: Standard, aspiration, fall, NPO   Orthopedic Precautions:N/A   Braces: N/A     Occupational Performance:    Bed Mobility:    · Patient completed Rolling/Turning to Left with  total assistance  · Patient completed Rolling/Turning to Right with total assistance    Functional Mobility/Transfers:  · Dependent drawsheet transfers    Activities of Daily Living:  · Feeding:  NPO    · Grooming: total assistance while supine    Cognitive/Visual Perceptual:  Cognitive/Psychosocial Skills:     -       Oriented to: Daily orientation provided   -       Follows Commands/attention:unable to follow commands  -       Communication: Nonverbal; intubated    Physical Exam:  Postural examination/scapula alignment:    -       Rounded shoulders  Skin integrity: Visible skin intact  Edema:  None noted  Upper Extremity Range of Motion:     -       Right Upper Extremity:PROM WNL  -       Left Upper Extremity: PROM WNL    AMPAC 6 Click ADL:  AMPAC Total Score: 6    Treatment & Education:  Patient education provided on role of OT and need for continued OT upon discharge. Daily orientation provided.  PROM performed bilateral UE/LEs one set x 10 rep in all planes of motion with stretches provided at end range; sustained stretch provided for ankle dorsiflexion.  Assistance and facilitation provided for upward rotation of the scapula during shoulder flexion and abduction.  Patient kept eyes closed.  Patient unable to follow commands.  Positioning provided for midline orientation with bilateral UEs elevated and heels lifted off mattress.  Family not present during the session.  Continued education, patient/ family training recommended.  White board updated in patient's room.   Education:    Patient left supine with all lines intact, call button in reach and restraints reapplied at  end of session    GOALS:   Multidisciplinary Problems     Occupational Therapy Goals        Problem: Occupational Therapy Goal    Goal Priority Disciplines Outcome Interventions   Occupational Therapy Goal     OT, PT/OT Ongoing, Progressing    Description:  Goals set 12/12 to be addressed for 14 days with expiration date, 12/26:  Patient will increase functional independence with ADLs by performing:    Patient will demonstrate rolling to the right with mod assist.  Not met   Patient will demonstrate rolling to the left with mod assist.   Not met  Patient will demonstrate supine -sit with max assist.   Not met  Patient will demonstrate stand pivot transfers with mod assist.   Not met  Patient will demonstrate grooming while seated with max assist.   Not met  Patient will demonstrate upper body dressing with max assist while seated EOB.   Not met  Patient will demonstrate lower body dressing with max assist while seated EOB.   Not met  Patient will demonstrate toileting with max assist.   Not met  Patient will demonstrate bathing while seated EOB with max assist.   Not met  Patient's family / caregiver will demonstrate independence and safety with assisting patient with self-care skills and functional mobility.     Not met  Patient's family / caregiver will demonstrate independence with providing ROM and changes in bed positioning.   Not met                      History:     History reviewed. No pertinent past medical history.    History reviewed. No pertinent surgical history.    Time Tracking:     OT Date of Treatment: 12/12/19  OT Start Time: 0530  OT Stop Time: 0550  OT Total Time (min): 20 min    Billable Minutes:Evaluation 19  Therapeutic Exercise 10    KYLER Ochoa  12/12/2019

## 2019-12-12 NOTE — SIGNIFICANT EVENT
On re-evaluation, ISTAT shows K 7.1. EKG shows peaked T-waves. Nephrology paged with plan for HD and orders placed. Pt transferred to Neuro ICU for further management. Pt given 20 mg Albuterol and 1000mg calcium gluconate to shift K. Repeat K 7.4 on ISTAT drawn from arterial line. Pt given kayexalate and Nephrology paged. HD nurse states that it will be at least 1 hour until HD can be started. Pt given additional gram of calcium gluconate STAT.     Insulin withheld at this time as glucose 97 while on D10 drip at 150.          Luiz Johnson MD, MPH  Neurology Resident - PGY2  Department of Neurology  [facility] [place] [address]      CC time 45 minutes     Critical secondary to Patient has a condition that poses threat to life and bodily function:      Critical care was time spent personally by me on the following activities: development of treatment plan with patient or surrogate and bedside caregivers, discussions with consultants, evaluation of patient's response to treatment, examination of patient, ordering and performing treatments and interventions, ordering and review of laboratory studies, ordering and review of radiographic studies, pulse oximetry, re-evaluation of patient's condition. This critical care time did not overlap with that of any other provider or involve time for any procedures.      Marni Sánchez PA-C

## 2019-12-12 NOTE — PROCEDURES
"Balta Lamb is a 49 y.o. male patient.    Temp: (!) 102.8 °F (39.3 °C) (12/11/19 1815)  Pulse: 102 (12/11/19 1832)  Resp: (!) 25 (12/11/19 1832)  BP: (!) 174/79 (12/11/19 1832)  SpO2: 100 % (12/11/19 1832)  Weight: 77.6 kg (171 lb) (12/11/19 1336)       Arterial Line  Date/Time: 12/11/2019 7:03 PM  Performed by: Marni Sánchez PA-C  Authorized by: Marni Sánchez PA-C   Assisting provider: Phuong oPllard NP  Consent Done: Emergent Situation  Site marked: the operative site was marked  Time out: Immediately prior to procedure a "time out" was called to verify the correct patient, procedure, equipment, support staff and site/side marked as required.  Preparation: Patient was prepped and draped in the usual sterile fashion.  Indications: multiple ABGs, respiratory failure and hemodynamic monitoring  Location: right brachial    Anesthesia:  Local anesthesia used: no  Patient sedated: yes  Analgesia: fentanyl  Vitals: Vital signs were monitored during sedation.  Needle gauge: 20  Number of attempts: 2  Complications: No  Post-procedure: dressing applied  Post-procedure CMS: unchanged  Patient tolerance: Patient tolerated the procedure well with no immediate complications          Marni Sánchez PA-C  12/11/2019  "

## 2019-12-12 NOTE — NURSING
Pt's nephrologist:  Dr. Malini Beaver [phone] and goes to [facility] [phone].

## 2019-12-12 NOTE — PROGRESS NOTES
Patient arrived to [facility] from [facility]> EMS> ED> Psychiatric    Type of stroke/diagnosis: AMS     Current symptoms: intubated, confused, combative    Skin assessment done: Yes    Wounds noted: skin tears, bruising    AAMIR Armband Applied: yes    NCC notified: LASHAY trimble, Phuong, NP

## 2019-12-12 NOTE — PROGRESS NOTES
Pharmacokinetic Initial Assessment: IV Vancomycin    Assessment/Plan:    Initiate intravenous vancomycin with loading dose of 1500 mg once with subsequent doses when random concentrations are less than 20 mcg/mL  Desired empiric serum trough concentration is 10 to 20 mcg/mL  Draw vancomycin random level on 12/13 at 1500.  Pharmacy will continue to follow and monitor vancomycin.      Please contact pharmacy at extension [phone] with any questions regarding this assessment.     Thank you for the consult,   Dylon JOSEPH Jennifer       Patient brief summary:  Balta Labm is a 49 y.o. male initiated on antimicrobial therapy with IV Vancomycin for treatment of suspected opportunistic infection prophylaxis    Drug Allergies:   Review of patient's allergies indicates:  No Known Allergies    Actual Body Weight:   77.6 kg    Renal Function:   Estimated Creatinine Clearance: 17.1 mL/min (A) (based on SCr of 5.1 mg/dL (H)).,     Dialysis Method (if applicable):  intermittent HD    CBC (last 72 hours):  Recent Labs   Lab Result Units 12/11/19  1524 12/11/19 2010   WBC K/uL  --  12.14   Hemoglobin g/dL  --  9.0*   Hemoglobin A1C % 7.5*  --    Hematocrit %  --  27.5*   Platelets K/uL  --  141*   Gran% %  --  88.6*   Lymph% %  --  3.2*   Mono% %  --  7.4   Eosinophil% %  --  0.2   Basophil% %  --  0.2   Differential Method   --  Automated       Metabolic Panel (last 72 hours):  Recent Labs   Lab Result Units 12/11/19  1406 12/11/19  1524 12/11/19 2010 12/12/19  0008   Sodium mmol/L 143 139 134* 138   Potassium mmol/L 6.5* 6.2* 7.5* 4.5   Chloride mmol/L 106 102 98 100   CO2 mmol/L 23 22* 25 26   Glucose mg/dL <5* 93 174* 259*   BUN, Bld mg/dL 54* 53* 54* 21*   Creatinine mg/dL 10.2* 9.9* 10.6* 5.1*   Albumin g/dL 4.3 3.9 3.5 3.5   Total Bilirubin mg/dL 0.9  --   --  1.0   Alkaline Phosphatase U/L 104  --   --  87   AST U/L 16  --   --  19   ALT U/L 17  --   --  14   Phosphorus mg/dL  --  5.5* 5.0*  --        Drug levels (last 3  results):  No results for input(s): VANCOMYCINRA, VANCOMYCINPE, VANCOMYCINTR in the last 72 hours.    Microbiologic Results:  Microbiology Results (last 7 days)     Procedure Component Value Units Date/Time    Culture, Respiratory with Gram Stain [[id number]] Collected:  12/11/19 2023    Order Status:  Completed Specimen:  Respiratory from Sputum, Induced Updated:  12/11/19 2210     Gram Stain (Respiratory) <10 epithelial cells per low power field.     Gram Stain (Respiratory) Rare WBC's     Gram Stain (Respiratory) Few Gram positive cocci     Gram Stain (Respiratory) Rare Gram negative rods    Narrative:       Mini-BAL.    Blood culture [[id number]] Collected:  12/11/19 2017    Order Status:  Sent Specimen:  Blood from Peripheral, Upper Arm, Left Updated:  12/11/19 2116    Blood culture [[id number]] Collected:  12/11/19 2017    Order Status:  Sent Specimen:  Blood from Peripheral, Antecubital, Left Updated:  12/11/19 2114

## 2019-12-12 NOTE — ASSESSMENT & PLAN NOTE
Agitation 2/2 metabolic encephalopathy   -d/c EEG   -acute agitation with correction of glucose   - on fentanyl gtt

## 2019-12-12 NOTE — CONSULTS
Midline placed  in right brachial, size [id number] Lot# [id number] Removal date on or before 1/10/2020. Needle advanced into vessel with real time ultrasound guidance. Image recorded and saved.

## 2019-12-12 NOTE — PROGRESS NOTES
Completed 3 hour dialysis tx. Ultrafiltrated 2 liters of fluid. No prolonged or excessive bleeding. Dsg applied to needle sticks. LAF with good thrill and bruit.

## 2019-12-12 NOTE — PROCEDURES
DATE:12/11/19    EEG NUMBER:  FH     REFERRING PHYSICIAN:  Dr. Johnson     This EEG was performed to assess for evidence of underlying epilepsy.     ELECTROENCEPHALOGRAM REPORT     METHODOLOGY:  Electroencephalographic (EEG) is recorded with electrodes placed according to the International 10-20 placement system.  Thirty two (32) channels of digital signal (sampling rate of 512/sec), including T1 and T2, were simultaneously recorded from the scalp and may include EKG, EMG, and/or eye monitors.  Recording band pass was 0.1 to 512 Hz.  Digital video recording of the patient is simultaneously recorded with the EEG. The patient is instructed to report clinical symptoms which may occur during the recording session.  EEG and video recording are stored and archived in digital format.  Activation procedures, which include photic stimulation, hyperventilation and instructing patients to perform simple tasks, are done in selected patients.     The EEG is displayed on a monitor screen and can be reviewed using different montages.  Computer-assisted analysis is employed to detect spike and electrographic seizure activity.  The entire record is submitted for computer analysis.  The entire recording is visually reviewed, and the times identified by computer analysis as being spikes or seizures are reviewed again.     Compressed spectral analysis (CSA) is also performed on the activity recorded from each individual channel.  This is displayed as a power display of frequencies from 0 to 30 Hz over time.  The CSA is reviewed looking for asymmetries in power between homologous areas of the scalp, then compared with the original EEG recording.     [employer] software was also utilized in the review of this study.  This software suite analyzes the EEG recording in multiple domains.  Coherence and rhythmicity are computed to identify EEG sections which may contain organized seizures.  Each channel undergoes analysis to detect the  presence of spike and sharp waves which have special and morphological characteristics of epileptic activity.  The routine EEG recording is converted from special into frequency domain.  This is then displayed comparing homologous areas to identify areas of significant asymmetry.  Algorithm to identify non-cortically generated artifact is used to separate artifact from the EEG.     Recording times  Start on December 11, 2019 at hours 20 min 42 sec 30  End of study on December 12, 2019 at hours 5 min 49 sec 50  The total time of EEG recording for the study was 9 hr and 2 min    EEG FINDINGS:  The recording was obtained with a number of standard bipolar and referential montages during lethargic state.  In this state the background diffusely disorganized in comprised admixture of theta range frequencies which were symmetric. Intermixed delta range that he was also noted.  Activation procedures were not performed.   no clear state changes were appreciated. There were no interictal epileptiform abnormalities and no clinical or electrographic seizures were recorded. Portions of the record were obscured by artifacts related to electrodes over the right temple region    The EKG channel revealed a sinus rhythm.     IMPRESSION:  This is an abnormal EEG during wakefulness, drowsiness and sleep.  Diffuse slowing of the background was noted     CLINICAL CORRELATION:  The patient is a 49 year old White male with ESRD on HD, HTN, DM2, transferred from [facility] and admitted to neurocritical care unit on 12/11/2019 for evaluation of altered mental status. This is an abnormal EEG during lethargic state.  The overall degree of slowing and disorganization suggests of a moderate degree of encephalopathy nonspecific to the cause. There is no evidence of an epileptic process on this recording.  No seizures were recorded during this study.

## 2019-12-12 NOTE — HOSPITAL COURSE
12/12/2019 Echo, Agitated on precedex, requiring fentanyl q2: will reassess agitation later concerning fentanyl drip, Started TF, monitor glucose, SSI   12/13/2019 schedule  Seroquel 50 bid, fentanyl max dose to 250, HD today, Free water 150 q6, Daily CXR  12/14/19 extubated without issue

## 2019-12-12 NOTE — PROGRESS NOTES
"  [facility]  Adult Nutrition  Consult Note    SUMMARY     Recommendations    Recommendation: 1. Recommend starting TF Novasource @goal rate 45mL/hr to provide 2160kcal, 98g protein, and 774mL free fluid. 2. If able to extubate and advance diet, recommend renal diabetic diet. 3. RD to monitor and f/u  Goals: Pt to receive nutrition by RD f/u  Nutrition Goal Status: new  Communication of RD Recs: (POC)    Reason for Assessment    Reason For Assessment: consult  Diagnosis: stroke/CVA  Relevant Medical History: HTN, DM2, ESRD  Interdisciplinary Rounds: did not attend  General Information Comments: Pt intubated, sedated. No family at bedside to provide wt hx. NFPE completed with swelling noted in the arms and mild depletion in the calves. Pt appears nourished.  Nutrition Discharge Planning: Unable to determine at this time    Nutrition Risk Screen    Nutrition Risk Screen: no indicators present    Nutrition/Diet History    Spiritual, Cultural Beliefs, Restorationism Practices, Values that Affect Care: no    Anthropometrics    Temp: 96.5 °F (35.8 °C)  Height: 5' 10" (177.8 cm)  Height (inches): 70 in  Weight Method: Bed Scale  Weight: 77.6 kg (171 lb)  Weight (lb): 171 lb  Ideal Body Weight (IBW), Male: 166 lb  % Ideal Body Weight, Male (lb): 103.01 lb  BMI (Calculated): 24.5       Lab/Procedures/Meds    Pertinent Labs Reviewed: reviewed  Pertinent Labs Comments: Hgb 8.7, Hct 26.8, BUN 23, Creatinine 6, GFR 9.9, Glu 248, Ca 8, Albumin 3.2  Pertinent Medications Reviewed: reviewed  Pertinent Medications Comments: senna-docusate, glucagon, statin, hyralazine, Ca gluconate      Estimated/Assessed Needs    Weight Used For Calorie Calculations: 77.6 kg (171 lb 1.2 oz)  Energy Calorie Requirements (kcal): 2251.09  Energy Need Method: [facility]  Protein Requirements: 93-116g/day  Weight Used For Protein Calculations: 77.6 kg (171 lb 1.2 oz)        RDA Method (mL): 2251.09         Nutrition Prescription " Ordered    Current Diet Order: NPO  Nutrition Order Comments: pt intubated    Evaluation of Received Nutrient/Fluid Intake    I/O: -.38L since admit  Energy Calories Required: not meeting needs  Protein Required: not meeting needs  Fluid Required: not meeting needs  % Intake of Estimated Energy Needs: 0 - 25 %  % Meal Intake: NPO    Nutrition Risk    Level of Risk/Frequency of Follow-up: moderate     Assessment and Plan    Nutrition Problem  Inadequate energy intake     Related to (etiology):   NPO  Intubation    Signs and Symptoms (as evidenced by):   Pt receiving <85% EEN/EPN    Interventions/Recommendations (treatment strategy):  Collaboration of care with providers    Nutrition Diagnosis Status:   New       Monitor and Evaluation    Food and Nutrient Intake: enteral nutrition intake  Food and Nutrient Adminstration: diet order, enteral and parenteral nutrition administration  Anthropometric Measurements: weight, weight change, body mass index  Biochemical Data, Medical Tests and Procedures: electrolyte and renal panel, gastrointestinal profile, glucose/endocrine profile, inflammatory profile, lipid profile  Nutrition-Focused Physical Findings: overall appearance     Malnutrition Assessment                 Upper Arm Region (Subcutaneous Fat Loss): (VINEET due to swelling)   [place] Region (Muscle Loss): well nourished  Clavicle Bone Region (Muscle Loss): well nourished  Clavicle and Acromion Bone Region (Muscle Loss): well nourished  Scapular Bone Region (Muscle Loss): well nourished  Dorsal Hand (Muscle Loss): (VINEET due to swelling)  Patellar Region (Muscle Loss): mild depletion  Posterior Calf Region (Muscle Loss): mild depletion                 Nutrition Follow-Up    RD Follow-up?: Yes

## 2019-12-12 NOTE — ASSESSMENT & PLAN NOTE
-ABG   -Monitor respiration   Vent Mode: A/C  Oxygen Concentration (%):  [] 50  Resp Rate Total:  [16 br/min-43 br/min] 25 br/min  Vt Set:  [420 mL-450 mL] 420 mL  PEEP/CPAP:  [5 cmH20] 5 cmH20  Pressure Support:  [0 cmH20] 0 cmH20  Mean Airway Pressure:  [-7.6 njZ39-29 cmH20] 9.5 cmH20

## 2019-12-12 NOTE — PT/OT/SLP PROGRESS
Speech Language Pathology  Discharge    Balta Lamb  MRN: [id number]    Patient not seen today secondary to pt intubated at this time. Please re-consult when medically appropriate. No further ST warranted at this time.     Elaina Hernandez CCC-SLP  12/12/2019

## 2019-12-12 NOTE — PROGRESS NOTES
"[facility]  Neurocritical Care  Progress Note    Admit Date: 12/11/2019  Service Date: 12/12/2019  Length of Stay: 1    Subjective:     Chief Complaint: Altered mental status    History of Present Illness: Balta Lamb is a 49 year old male with a medical history significant for HTN, DM2, ESRD (unknown schedule or last HD date) on HD who presents as a transfer from OSH for neurologic evaluation of altered mental status and "L sided numbness". History is obtained from chart as pt is alone and sedated. Per chart, pt was driving with his wife when he noted acute onset left sided numbness. Wife was able to pull car over and call 911. EMS found pt confused and combative. Pt was transported to OSH where CT Head showed no acute change and encephalomalacia in L frontal and L parietal region from prior craniotomy (unknown reason or diagnosis). Routine labs showed a K of 6.2 and Cr of 10.2. OSH ED attempted to shift the pts K with insulin. Pt was started on a Versed infusion and intubated at OSH prior to [facility]. On arrival, pts glucose was noted to be <20 and K was 3.4. CTA Multiphase showed no acute hemorrhage or major vascular distribution infarct and no evidence high-grade stenosis or major vessel occlusion. Pt was started on D10 drip for hypoglycemia and will be admitted to [facility] for further evalaution and higher level of care.    Hospital Course: 12/12/2019 Echo, Agitated on precedex, requiring fentanyl q2: will reassess agitation later concerning fentanyl drip, Started TF, monitor glucose, SSI           Interval History:    -Echo  - Sliding scale moderate dose  - DVT ppx   - Agitated on precedex, requiring fentanyl q2: will reassess agitation later in the day regarding need for fentanyl drip.    - Started TF  -Monitor Glucose   Review of Systems   Unable to perform ROS: Intubated     Objective:     Vitals:  Temp: 96.5 °F (35.8 °C)  Pulse: 60  Rhythm: normal sinus " rhythm  BP: (!) 143/55  MAP (mmHg): 79  Resp: (!) 25  SpO2: 100 %  Oxygen Concentration (%): 50  O2 Device (Oxygen Therapy): ventilator  Vent Mode: A/C  Set Rate: 25 bmp  Vt Set: 420 mL  PEEP/CPAP: 5 cmH20  Peak Airway Pressure: 21 cmH2O  Mean Airway Pressure: 9.5 cmH20  Plateau Pressure: 0 cmH20    Temp  Min: 96.5 °F (35.8 °C)  Max: 102.8 °F (39.3 °C)  Pulse  Min: 60  Max: 112  BP  Min: 122/59  Max: 258/131  MAP (mmHg)  Min: 79  Max: 165  Resp  Min: 20  Max: 57  SpO2  Min: 99 %  Max: 100 %  Oxygen Concentration (%)  Min: 50  Max: 100    12/11 0701 - 12/12 0700  In: 2100.3 [I.V.:650.3]  Out: 2500            Physical Exam   Constitutional: He appears well-developed and well-nourished. He is intubated.   HENT:   Head: Normocephalic and atraumatic.   Cardiovascular: Normal rate and regular rhythm.   Pulmonary/Chest: He is intubated.   Abdominal: Soft. Normal appearance and bowel sounds are normal.   Neurological:   Awake, does not follow commands   E1V(T1)M4 GCS (5)   R pupil nonresponive to light   L eye cloudy   Acute agitation   SAUCEDO spontaneously and against gravity        Medications:  Continuous  dexmedetomidine (PRECEDEX) infusion Last Rate: 0.6 mcg/kg/hr (12/12/19 1205)   fentanyl Last Rate: 100 mcg/hr (12/12/19 1205)   Scheduled  atorvastatin 40 mg Daily   chlorhexidine 15 mL BID   famotidine 20 mg Daily   fentaNYL 100 mcg Once   heparin (porcine) 5,000 Units Q8H   piperacillin-tazobactam 4.5 g in sodium chloride 0.9% 100 mL IVPB (ready to mix system) 4.5 g Q12H   senna-docusate 8.6-50 mg 1 tablet Daily   PRN  acetaminophen 650 mg Q6H PRN   albuterol sulfate 20 mg Q4H PRN   calcium gluconate IVPB 1 g PRN   calcium gluconate IVPB 1 g PRN   calcium gluconate IVPB 1 g PRN   Dextrose 10% Bolus 12.5 g PRN   Dextrose 10% Bolus 25 g PRN   dextrose 50% 25 g PRN   fentaNYL 50 mcg Q2H PRN   glucagon (human recombinant) 1 mg PRN   glucose 16 g PRN   glucose 24 g PRN   hydrALAZINE 10 mg Q6H PRN   insulin aspart U-100 1-10  Units Q4H PRN   sodium chloride 0.9% 10 mL PRN   vancomycin - pharmacy to dose  pharmacy to manage frequency     Today I personally reviewed pertinent medications, lines/drains/airways, imaging, cardiology results, laboratory results, microbiology results, notably:    Diet  Diet NPO  Diet NPO      Assessment/Plan:     Neuro  * Altered mental status  49 year old male with HTN, DM2 and ESRD on HD who presented to OSH with acute onset L sided weakness and confusion.     - Neuro checks Q1hr   - Vital checks Q1hr   - Vascular Neurology  - CT Head WO OSH unremarkable for acute infarction or hemorrhage   - CTA Head/neck - no acute change, no high grade stenosis  - SBP <200 mmHg  - EKG, ECHO pending  - CMP daily, Replete electrolytes PRN  - SSI  - Q4 POCT Glucose checks  - on fentanyl gtt for acute agitation   - PT/OT/SLP when appropriate       Psychiatric  Agitation  Agitation 2/2 metabolic encephalopathy   -d/c EEG   -acute agitation with correction of glucose   - on fentanyl gtt       Pulmonary  On mechanically assisted ventilation  -ABG   -Monitor respiration   Vent Mode: A/C  Oxygen Concentration (%):  [] 50  Resp Rate Total:  [16 br/min-43 br/min] 25 br/min  Vt Set:  [420 mL-450 mL] 420 mL  PEEP/CPAP:  [5 cmH20] 5 cmH20  Pressure Support:  [0 cmH20] 0 cmH20  Mean Airway Pressure:  [-7.6 yxJ19-35 cmH20] 9.5 cmH20      Cardiac/Vascular  Essential hypertension  - Hypertensive on admit,    - Goal SBP <200      Renal/  ESRD (end stage renal disease) on dialysis  - HD fistula in LUE  - Cr 6.4, GFR 10.6   - Nephrology following   - Renally dose medications  - Strict I/Os     Endocrine  Hypoglycemia, coma  - Present on admission  - OSH attempted to shift HyperK with insulin    - On arrival, pt with glucose <5  - D10 drip gtt stopped (12/12),   - on 12/12 Glucose (248)  - POCT glucose q4 hour and SSI   - Monitor CMP    Type 2 diabetes mellitus  - Home glipizide held  - on admit glucose <5, started on D10 drip  d/t hypoglycemia   - D10 gtt stopped (12/12)   - (12/12) Glucose 248  - on SSI, POCT glucose q4hrs            The patient is being Prophylaxed for:  Venous Thromboembolism with: Mechanical or Chemical  Stress Ulcer with: H2B  Ventilator Pneumonia with: chlorhexidine oral care    Activity Orders          Diet NPO: NPO starting at 12/11 1515        Full Code   I have spent 35 min with this patient, with over 50% of this time spent coordinating care and speaking with the family      Elliot Armenta PA-C  Neurocritical Care  [facility]

## 2019-12-12 NOTE — PROGRESS NOTES
[facility]  Nephrology  Progress Note    Patient Name: Balta Lamb  MRN: [id number]  Admission Date: 12/11/2019  Hospital Length of Stay: 1 days  Attending Provider: Cory Aguilera MD   Primary Care Physician: No primary care provider on file.  Principal Problem:<principal problem not specified>    Subjective:     HPI: 49 year old White male with ESRD on HD (unknown details/schedule or last HD treatment), HTN, DM2, transferred from OSH and admitted to neurocritical care unit on 12/11/2019 per chart for neurologic evaluation of altered mental status, along with Lt sided numbness.  History is obtained from chart, no family members at bedside, patient sedated on mechanical ventilation.  Per chart, pt was driving with his wife when he noted acute onset left sided numbness.  Wife was able to pull car over and call 911. EMS found pt confused and combative. Pt was transported to OSH where CT Head showed no acute change and encephalomalacia in L frontal and L parietal region from prior craniotomy (unknown reason or diagnosis).  CTA Multiphase showed no acute hemorrhage or major vascular distribution infarct and no evidence high-grade stenosis or major vessel occlusion.  Patient hypoglycemic on arrival to [facility], started on D10 drip.    Nephrology consulted for evaluation of ESRD and HD treatments.    Interval History: Patient evaluated bedside, critically ill, sedated on mechanical ventilation.  Underwent HD treatment yesterday tolerated well, good clearance.  Night uneventful.    Review of patient's allergies indicates:  No Known Allergies  Current Facility-Administered Medications   Medication Frequency    acetaminophen tablet 650 mg Q6H PRN    albuterol sulfate nebulizer solution 20 mg Q4H PRN    atorvastatin tablet 40 mg Daily    calcium gluconate 1g in dextrose 5% 100mL (ready to mix system) PRN    calcium gluconate 1g in dextrose 5% 100mL (ready to mix system) PRN    calcium gluconate 1g in  dextrose 5% 100mL (ready to mix system) PRN    dexmedetomidine (PRECEDEX) 400mcg/100mL 0.9% NaCL infusion Continuous    dextrose 10% (D10W) Bolus PRN    dextrose 10% (D10W) Bolus PRN    dextrose 50% injection 25 g PRN    fentaNYL injection 50 mcg Q2H PRN    glucagon (human recombinant) injection 1 mg PRN    glucose chewable tablet 16 g PRN    glucose chewable tablet 24 g PRN    hydrALAZINE injection 10 mg Q6H PRN    piperacillin-tazobactam 4.5 g in sodium chloride 0.9% 100 mL IVPB (ready to mix system) Q12H    senna-docusate 8.6-50 mg per tablet 1 tablet Daily    sodium chloride 0.9% flush 10 mL PRN    vancomycin - pharmacy to dose pharmacy to manage frequency       Objective:     Vital Signs (Most Recent):  Temp: 96.6 °F (35.9 °C) (12/12/19 0705)  Pulse: 66 (12/12/19 0907)  Resp: (!) 25 (12/12/19 0907)  BP: (!) 185/84 (12/12/19 0700)  SpO2: 100 % (12/12/19 0907)  O2 Device (Oxygen Therapy): ventilator (12/12/19 0907) Vital Signs (24h Range):  Temp:  [96.6 °F (35.9 °C)-102.8 °F (39.3 °C)] 96.6 °F (35.9 °C)  Pulse:  [] 66  Resp:  [17-57] 25  SpO2:  [99 %-100 %] 100 %  BP: (122-258)/() 185/84  Arterial Line BP: (122-206)/(49-75) 165/57     Weight: 77.6 kg (171 lb 1.2 oz) (12/11/19 2217)  Body mass index is 25.26 kg/m².  Body surface area is 1.94 meters squared.    I/O last 3 completed shifts:  In: 2100.3 [I.V.:650.3; Other:500; IV Piggyback:950]  Out: 2500 [Other:2500]    Physical Exam   Constitutional: No distress.   Critically ill   HENT:   Head: Normocephalic and atraumatic.   Endotracheal tube in place   Neck: Neck supple.   Cardiovascular: Normal rate and regular rhythm.   Pulmonary/Chest:   Vent transmitted breath sounds   Abdominal: Soft. He exhibits no distension.   Musculoskeletal: He exhibits deformity (multiple toe amputation). He exhibits no edema.   LUE AVF +thrill/+bruit   Neurological:   Sedated   Skin: Skin is warm and dry. He is not diaphoretic. No erythema.   Trophic changes  to extremities   Nursing note and vitals reviewed.      Significant Labs:  CBC:   Recent Labs   Lab 12/12/19  0321   WBC 9.32   RBC 2.56*   HGB 8.7*   HCT 26.8*   *   *   MCH 34.0*   MCHC 32.5     CMP:   Recent Labs   Lab 12/12/19  0321   *  248*   CALCIUM 8.0*  8.0*   ALBUMIN 3.2*  3.2*   PROT 5.9*     136   K 4.9  4.9   CO2 28  28   CL 99  99   BUN 23*  23*   CREATININE 6.0*  6.0*   ALKPHOS 80   ALT 14   AST 19   BILITOT 0.7     All labs within the past 24 hours have been reviewed.       Assessment/Plan:     ESRD (end stage renal disease) on dialysis  End-Stage Renal Disease on HD  - No RRt needed today; will provide dialysis for metabolic clearance and volume management in a TiW fashion  - Dialysate will be adjusted to current labs   - Medications doses to renal parameters  - Strict Input and Output and chart  - Pre/post HD standing weights      Anemia of Chronic Kidney Disease   - Hb > 7 gm/dL  - Iron studies    Mineral Bone Disease in CKD   - Renal Function Panel Daily for electrolytes monitoring    HTN   - elevated BP, will continue to monitor. Goal for BP per primary team, but below 180 systolics for HD treatments.    Nutrition   - Renal Diet when able    Case discussed with staff further recs with attestation.      Altered mental status  Per primary team        Thank you for your consult. I will follow-up with patient. Please contact us if you have any additional questions.    Damian Pena MD  Nephrology  [facility]

## 2019-12-12 NOTE — ASSESSMENT & PLAN NOTE
End-Stage Renal Disease on HD  - No RRt needed today; will provide dialysis for metabolic clearance and volume management in a TiW fashion  - Dialysate will be adjusted to current labs   - Medications doses to renal parameters  - Strict Input and Output and chart  - Pre/post HD standing weights      Anemia of Chronic Kidney Disease   - Hb > 7 gm/dL  - Iron studies    Mineral Bone Disease in CKD   - Renal Function Panel Daily for electrolytes monitoring    HTN   - elevated BP, will continue to monitor. Goal for BP per primary team, but below 180 systolics for HD treatments.    Nutrition   - Renal Diet when able    Case discussed with staff further recs with attestation.

## 2019-12-12 NOTE — CONSULTS
Inpatient consult to Physical Medicine Rehab  Consult performed by: VIMAL Tirado  Consult ordered by: Luiz Johnson MD  Reason for consult: assess rehab needs      Consult received.  Reviewed patient history and current admission.    Patient is intubated and too acute at this time; rehab potential cannot be appropriately assessed.  Recommend early mobility, OOB in chair, and PT/OT/SLP when appropriate.  Will follow for additional rehab needs and post-acute recommendation when patient is medically stable and able to participate with therapy.    Thank you for consult.    NERY Shepard, EVERTONP-C  Physical Medicine & Rehabilitation   12/12/2019  Spectralink: [id number]

## 2019-12-12 NOTE — PROCEDURES
"Balta Lamb is a 49 y.o. male patient.    Temp: 98.8 °F (37.1 °C) (12/11/19 2320)  Pulse: 92 (12/12/19 0108)  Resp: (!) 25 (12/12/19 0108)  BP: (!) 187/84 (12/12/19 0105)  SpO2: 100 % (12/12/19 0108)  Weight: 77.6 kg (171 lb 1.2 oz) (12/11/19 2217)  Height: 5' 9" (175.3 cm) (12/11/19 2217)       Arterial Line  Date/Time: 12/12/2019 1:57 AM  Performed by: Mike Conway NP  Authorized by: Mike Conway NP   Consent Done: Emergent Situation  Preparation: Patient was prepped and draped in the usual sterile fashion.  Indications: multiple ABGs, respiratory failure and hemodynamic monitoring  Location: right brachial  Patient sedated: no  Alec's test normal: yes  Needle gauge: 20  Seldinger technique: Seldinger technique used  Number of attempts: 1  Complications: No  Specimens: No  Implants: No  Post-procedure: dressing applied  Post-procedure CMS: normal and unchanged  Patient tolerance: Patient tolerated the procedure well with no immediate complications          Mike Conway  12/12/2019  "

## 2019-12-13 LAB
ALBUMIN SERPL BCP-MCNC: 2.7 G/DL (ref 3.5–5.2)
ALBUMIN SERPL BCP-MCNC: 2.7 G/DL (ref 3.5–5.2)
ALBUMIN SERPL BCP-MCNC: 2.8 G/DL (ref 3.5–5.2)
ALBUMIN SERPL BCP-MCNC: 2.8 G/DL (ref 3.5–5.2)
ALBUMIN SERPL BCP-MCNC: 2.9 G/DL (ref 3.5–5.2)
ALLENS TEST: ABNORMAL
ALP SERPL-CCNC: 83 U/L (ref 55–135)
ALT SERPL W/O P-5'-P-CCNC: 15 U/L (ref 10–44)
ANION GAP SERPL CALC-SCNC: 11 MMOL/L (ref 8–16)
ANION GAP SERPL CALC-SCNC: 11 MMOL/L (ref 8–16)
ANION GAP SERPL CALC-SCNC: 13 MMOL/L (ref 8–16)
ANION GAP SERPL CALC-SCNC: 8 MMOL/L (ref 8–16)
ANION GAP SERPL CALC-SCNC: 8 MMOL/L (ref 8–16)
AST SERPL-CCNC: 19 U/L (ref 10–40)
BASOPHILS # BLD AUTO: 0.03 K/UL (ref 0–0.2)
BASOPHILS NFR BLD: 0.4 % (ref 0–1.9)
BILIRUB SERPL-MCNC: 0.9 MG/DL (ref 0.1–1)
BUN SERPL-MCNC: 15 MG/DL (ref 6–20)
BUN SERPL-MCNC: 23 MG/DL (ref 6–20)
BUN SERPL-MCNC: 31 MG/DL (ref 6–20)
BUN SERPL-MCNC: 31 MG/DL (ref 6–20)
BUN SERPL-MCNC: 34 MG/DL (ref 6–20)
CALCIUM SERPL-MCNC: 7.9 MG/DL (ref 8.7–10.5)
CALCIUM SERPL-MCNC: 8 MG/DL (ref 8.7–10.5)
CALCIUM SERPL-MCNC: 8 MG/DL (ref 8.7–10.5)
CALCIUM SERPL-MCNC: 8.1 MG/DL (ref 8.7–10.5)
CALCIUM SERPL-MCNC: 8.4 MG/DL (ref 8.7–10.5)
CHLORIDE SERPL-SCNC: 109 MMOL/L (ref 95–110)
CHLORIDE SERPL-SCNC: 110 MMOL/L (ref 95–110)
CHLORIDE SERPL-SCNC: 98 MMOL/L (ref 95–110)
CHLORIDE SERPL-SCNC: 98 MMOL/L (ref 95–110)
CHLORIDE SERPL-SCNC: 99 MMOL/L (ref 95–110)
CO2 SERPL-SCNC: 24 MMOL/L (ref 23–29)
CO2 SERPL-SCNC: 24 MMOL/L (ref 23–29)
CO2 SERPL-SCNC: 25 MMOL/L (ref 23–29)
CO2 SERPL-SCNC: 26 MMOL/L (ref 23–29)
CO2 SERPL-SCNC: 26 MMOL/L (ref 23–29)
CREAT SERPL-MCNC: 3.9 MG/DL (ref 0.5–1.4)
CREAT SERPL-MCNC: 5 MG/DL (ref 0.5–1.4)
CREAT SERPL-MCNC: 7.2 MG/DL (ref 0.5–1.4)
CREAT SERPL-MCNC: 7.2 MG/DL (ref 0.5–1.4)
CREAT SERPL-MCNC: 7.7 MG/DL (ref 0.5–1.4)
DELSYS: ABNORMAL
DIFFERENTIAL METHOD: ABNORMAL
EOSINOPHIL # BLD AUTO: 0.2 K/UL (ref 0–0.5)
EOSINOPHIL NFR BLD: 2.4 % (ref 0–8)
ERYTHROCYTE [DISTWIDTH] IN BLOOD BY AUTOMATED COUNT: 14 % (ref 11.5–14.5)
ERYTHROCYTE [SEDIMENTATION RATE] IN BLOOD BY WESTERGREN METHOD: 25 MM/H
EST. GFR  (AFRICAN AMERICAN): 14.3 ML/MIN/1.73 M^2
EST. GFR  (AFRICAN AMERICAN): 19.4 ML/MIN/1.73 M^2
EST. GFR  (AFRICAN AMERICAN): 8.5 ML/MIN/1.73 M^2
EST. GFR  (AFRICAN AMERICAN): 9.2 ML/MIN/1.73 M^2
EST. GFR  (AFRICAN AMERICAN): 9.2 ML/MIN/1.73 M^2
EST. GFR  (NON AFRICAN AMERICAN): 12.4 ML/MIN/1.73 M^2
EST. GFR  (NON AFRICAN AMERICAN): 16.7 ML/MIN/1.73 M^2
EST. GFR  (NON AFRICAN AMERICAN): 7.4 ML/MIN/1.73 M^2
EST. GFR  (NON AFRICAN AMERICAN): 8 ML/MIN/1.73 M^2
EST. GFR  (NON AFRICAN AMERICAN): 8 ML/MIN/1.73 M^2
FIO2: 50
GLUCOSE SERPL-MCNC: 119 MG/DL (ref 70–110)
GLUCOSE SERPL-MCNC: 123 MG/DL (ref 70–110)
GLUCOSE SERPL-MCNC: 123 MG/DL (ref 70–110)
GLUCOSE SERPL-MCNC: 131 MG/DL (ref 70–110)
GLUCOSE SERPL-MCNC: 197 MG/DL (ref 70–110)
HCO3 UR-SCNC: 25.3 MMOL/L (ref 24–28)
HCT VFR BLD AUTO: 29 % (ref 40–54)
HGB BLD-MCNC: 9.3 G/DL (ref 14–18)
IMM GRANULOCYTES # BLD AUTO: 0.02 K/UL (ref 0–0.04)
IMM GRANULOCYTES NFR BLD AUTO: 0.3 % (ref 0–0.5)
LYMPHOCYTES # BLD AUTO: 0.7 K/UL (ref 1–4.8)
LYMPHOCYTES NFR BLD: 9 % (ref 18–48)
MAGNESIUM SERPL-MCNC: 2.2 MG/DL (ref 1.6–2.6)
MCH RBC QN AUTO: 33.3 PG (ref 27–31)
MCHC RBC AUTO-ENTMCNC: 32.1 G/DL (ref 32–36)
MCV RBC AUTO: 104 FL (ref 82–98)
MODE: ABNORMAL
MONOCYTES # BLD AUTO: 0.5 K/UL (ref 0.3–1)
MONOCYTES NFR BLD: 6.4 % (ref 4–15)
NEUTROPHILS # BLD AUTO: 6.4 K/UL (ref 1.8–7.7)
NEUTROPHILS NFR BLD: 81.5 % (ref 38–73)
NRBC BLD-RTO: 0 /100 WBC
PCO2 BLDA: 35.7 MMHG (ref 35–45)
PEEP: 5
PH SMN: 7.46 [PH] (ref 7.35–7.45)
PHOSPHATE SERPL-MCNC: 3.5 MG/DL (ref 2.7–4.5)
PHOSPHATE SERPL-MCNC: 4.2 MG/DL (ref 2.7–4.5)
PHOSPHATE SERPL-MCNC: 5.3 MG/DL (ref 2.7–4.5)
PHOSPHATE SERPL-MCNC: 5.3 MG/DL (ref 2.7–4.5)
PHOSPHATE SERPL-MCNC: 5.6 MG/DL (ref 2.7–4.5)
PLATELET # BLD AUTO: 110 K/UL (ref 150–350)
PMV BLD AUTO: 11.4 FL (ref 9.2–12.9)
PO2 BLDA: 172 MMHG (ref 80–100)
POC BE: 1 MMOL/L
POC SATURATED O2: 100 % (ref 95–100)
POC TCO2: 26 MMOL/L (ref 23–27)
POCT GLUCOSE: 118 MG/DL (ref 70–110)
POCT GLUCOSE: 124 MG/DL (ref 70–110)
POCT GLUCOSE: 124 MG/DL (ref 70–110)
POCT GLUCOSE: 179 MG/DL (ref 70–110)
POCT GLUCOSE: 98 MG/DL (ref 70–110)
POTASSIUM SERPL-SCNC: 3.8 MMOL/L (ref 3.5–5.1)
POTASSIUM SERPL-SCNC: 4 MMOL/L (ref 3.5–5.1)
POTASSIUM SERPL-SCNC: 4.6 MMOL/L (ref 3.5–5.1)
POTASSIUM SERPL-SCNC: 4.7 MMOL/L (ref 3.5–5.1)
POTASSIUM SERPL-SCNC: 4.7 MMOL/L (ref 3.5–5.1)
PROT SERPL-MCNC: 5.7 G/DL (ref 6–8.4)
RBC # BLD AUTO: 2.79 M/UL (ref 4.6–6.2)
SAMPLE: ABNORMAL
SITE: ABNORMAL
SODIUM SERPL-SCNC: 135 MMOL/L (ref 136–145)
SODIUM SERPL-SCNC: 135 MMOL/L (ref 136–145)
SODIUM SERPL-SCNC: 136 MMOL/L (ref 136–145)
SODIUM SERPL-SCNC: 142 MMOL/L (ref 136–145)
SODIUM SERPL-SCNC: 142 MMOL/L (ref 136–145)
VANCOMYCIN SERPL-MCNC: 33.4 UG/ML
VT: 420
WBC # BLD AUTO: 7.85 K/UL (ref 3.9–12.7)

## 2019-12-13 PROCEDURE — 25000003 PHARM REV CODE 250: Performed by: PSYCHIATRY & NEUROLOGY

## 2019-12-13 PROCEDURE — 25000003 PHARM REV CODE 250: Performed by: STUDENT IN AN ORGANIZED HEALTH CARE EDUCATION/TRAINING PROGRAM

## 2019-12-13 PROCEDURE — 94761 N-INVAS EAR/PLS OXIMETRY MLT: CPT

## 2019-12-13 PROCEDURE — 63600175 PHARM REV CODE 636 W HCPCS: Performed by: PSYCHIATRY & NEUROLOGY

## 2019-12-13 PROCEDURE — 99233 PR SUBSEQUENT HOSPITAL CARE,LEVL III: ICD-10-PCS | Mod: ,,, | Performed by: PSYCHIATRY & NEUROLOGY

## 2019-12-13 PROCEDURE — 80053 COMPREHEN METABOLIC PANEL: CPT

## 2019-12-13 PROCEDURE — 83735 ASSAY OF MAGNESIUM: CPT

## 2019-12-13 PROCEDURE — 99900026 HC AIRWAY MAINTENANCE (STAT)

## 2019-12-13 PROCEDURE — 90935 HEMODIALYSIS ONE EVALUATION: CPT | Mod: ,,, | Performed by: INTERNAL MEDICINE

## 2019-12-13 PROCEDURE — 84100 ASSAY OF PHOSPHORUS: CPT

## 2019-12-13 PROCEDURE — 25000003 PHARM REV CODE 250: Performed by: NURSE PRACTITIONER

## 2019-12-13 PROCEDURE — 80069 RENAL FUNCTION PANEL: CPT | Mod: 91

## 2019-12-13 PROCEDURE — 82803 BLOOD GASES ANY COMBINATION: CPT

## 2019-12-13 PROCEDURE — 37799 UNLISTED PX VASCULAR SURGERY: CPT

## 2019-12-13 PROCEDURE — 80202 ASSAY OF VANCOMYCIN: CPT

## 2019-12-13 PROCEDURE — 99900035 HC TECH TIME PER 15 MIN (STAT)

## 2019-12-13 PROCEDURE — 94003 VENT MGMT INPAT SUBQ DAY: CPT

## 2019-12-13 PROCEDURE — 90935 PR HEMODIALYSIS, ONE EVALUATION: ICD-10-PCS | Mod: ,,, | Performed by: INTERNAL MEDICINE

## 2019-12-13 PROCEDURE — 63600175 PHARM REV CODE 636 W HCPCS: Performed by: NURSE PRACTITIONER

## 2019-12-13 PROCEDURE — 85025 COMPLETE CBC W/AUTO DIFF WBC: CPT

## 2019-12-13 PROCEDURE — P9047 ALBUMIN (HUMAN), 25%, 50ML: HCPCS | Mod: JG | Performed by: PSYCHIATRY & NEUROLOGY

## 2019-12-13 PROCEDURE — 99233 SBSQ HOSP IP/OBS HIGH 50: CPT | Mod: ,,, | Performed by: PSYCHIATRY & NEUROLOGY

## 2019-12-13 PROCEDURE — 20000000 HC ICU ROOM

## 2019-12-13 RX ORDER — QUETIAPINE FUMARATE 25 MG/1
50 TABLET, FILM COATED ORAL 2 TIMES DAILY
Status: DISCONTINUED | OUTPATIENT
Start: 2019-12-13 | End: 2019-12-19

## 2019-12-13 RX ORDER — FENTANYL CITRATE-0.9 % NACL/PF 10 MCG/ML
25 PLASTIC BAG, INJECTION (ML) INTRAVENOUS CONTINUOUS
Status: DISCONTINUED | OUTPATIENT
Start: 2019-12-13 | End: 2019-12-14

## 2019-12-13 RX ORDER — ALBUMIN HUMAN 250 G/1000ML
25 SOLUTION INTRAVENOUS ONCE
Status: COMPLETED | OUTPATIENT
Start: 2019-12-13 | End: 2019-12-13

## 2019-12-13 RX ORDER — SODIUM CHLORIDE 9 MG/ML
INJECTION, SOLUTION INTRAVENOUS ONCE
Status: DISCONTINUED | OUTPATIENT
Start: 2019-12-13 | End: 2019-12-15

## 2019-12-13 RX ADMIN — ATORVASTATIN CALCIUM 40 MG: 20 TABLET, FILM COATED ORAL at 08:12

## 2019-12-13 RX ADMIN — CHLORHEXIDINE GLUCONATE 0.12% ORAL RINSE 15 ML: 1.2 LIQUID ORAL at 08:12

## 2019-12-13 RX ADMIN — FENTANYL CITRATE 50 MCG: 50 INJECTION INTRAMUSCULAR; INTRAVENOUS at 02:12

## 2019-12-13 RX ADMIN — ALBUMIN (HUMAN) 25 G: 25 SOLUTION INTRAVENOUS at 10:12

## 2019-12-13 RX ADMIN — HEPARIN SODIUM 5000 UNITS: 5000 INJECTION, SOLUTION INTRAVENOUS; SUBCUTANEOUS at 05:12

## 2019-12-13 RX ADMIN — FAMOTIDINE 20 MG: 20 TABLET ORAL at 08:12

## 2019-12-13 RX ADMIN — PIPERACILLIN AND TAZOBACTAM 4.5 G: 4; .5 INJECTION, POWDER, FOR SOLUTION INTRAVENOUS at 02:12

## 2019-12-13 RX ADMIN — QUETIAPINE FUMARATE 50 MG: 25 TABLET ORAL at 09:12

## 2019-12-13 RX ADMIN — HEPARIN SODIUM 5000 UNITS: 5000 INJECTION, SOLUTION INTRAVENOUS; SUBCUTANEOUS at 09:12

## 2019-12-13 RX ADMIN — FENTANYL CITRATE 50 MCG: 50 INJECTION INTRAMUSCULAR; INTRAVENOUS at 04:12

## 2019-12-13 RX ADMIN — INSULIN ASPART 1 UNITS: 100 INJECTION, SOLUTION INTRAVENOUS; SUBCUTANEOUS at 09:12

## 2019-12-13 RX ADMIN — CHLORHEXIDINE GLUCONATE 0.12% ORAL RINSE 15 ML: 1.2 LIQUID ORAL at 09:12

## 2019-12-13 RX ADMIN — HEPARIN SODIUM 5000 UNITS: 5000 INJECTION, SOLUTION INTRAVENOUS; SUBCUTANEOUS at 02:12

## 2019-12-13 RX ADMIN — Medication 112.5 MCG/HR: at 02:12

## 2019-12-13 RX ADMIN — DEXMEDETOMIDINE HYDROCHLORIDE 1 MCG/KG/HR: 4 INJECTION, SOLUTION INTRAVENOUS at 05:12

## 2019-12-13 RX ADMIN — SENNOSIDES AND DOCUSATE SODIUM 1 TABLET: 8.6; 5 TABLET ORAL at 08:12

## 2019-12-13 NOTE — SUBJECTIVE & OBJECTIVE
Interval History: Patient evaluated bedside, stable vital signs, night uneventful.  Continues intubated and sedated.  HD session scheduled for today.    Review of patient's allergies indicates:  No Known Allergies  Current Facility-Administered Medications   Medication Frequency    0.9%  NaCl infusion Once    acetaminophen tablet 650 mg Q6H PRN    albuterol sulfate nebulizer solution 20 mg Q4H PRN    atorvastatin tablet 40 mg Daily    calcium gluconate 1g in dextrose 5% 100mL (ready to mix system) PRN    calcium gluconate 1g in dextrose 5% 100mL (ready to mix system) PRN    calcium gluconate 1g in dextrose 5% 100mL (ready to mix system) PRN    chlorhexidine 0.12 % solution 15 mL BID    dexmedetomidine (PRECEDEX) 400mcg/100mL 0.9% NaCL infusion Continuous    dextrose 10% (D10W) Bolus PRN    dextrose 10% (D10W) Bolus PRN    dextrose 50% injection 25 g PRN    famotidine tablet 20 mg Daily    fentaNYL 2500 mcg in 0.9% sodium chloride 250 mL infusion premix (titrating) Continuous    fentaNYL injection 100 mcg Once    fentaNYL injection 50 mcg Q2H PRN    glucagon (human recombinant) injection 1 mg PRN    glucose chewable tablet 16 g PRN    glucose chewable tablet 24 g PRN    heparin (porcine) injection 5,000 Units Q8H    hydrALAZINE injection 10 mg Q6H PRN    insulin aspart U-100 pen 1-10 Units Q4H PRN    piperacillin-tazobactam 4.5 g in sodium chloride 0.9% 100 mL IVPB (ready to mix system) Q12H    QUEtiapine tablet 50 mg BID    senna-docusate 8.6-50 mg per tablet 1 tablet Daily    sodium chloride 0.9% flush 10 mL PRN    vancomycin - pharmacy to dose pharmacy to manage frequency       Objective:     Vital Signs (Most Recent):  Temp: 98 °F (36.7 °C) (12/13/19 0305)  Pulse: 63 (12/13/19 1030)  Resp: (!) 25 (12/13/19 1030)  BP: (!) 87/39 (12/13/19 1030)  SpO2: 100 % (12/13/19 1030)  O2 Device (Oxygen Therapy): ventilator (12/13/19 0740) Vital Signs (24h Range):  Temp:  [96.5 °F (35.8 °C)-98.5 °F  (36.9 °C)] 98 °F (36.7 °C)  Pulse:  [] 63  Resp:  [22-28] 25  SpO2:  [96 %-100 %] 100 %  BP: ()/(39-80) 87/39  Arterial Line BP: (146-197)/(44-70) 194/61     Weight: 77.6 kg (171 lb) (12/12/19 1205)  Body mass index is 24.54 kg/m².  Body surface area is 1.96 meters squared.    I/O last 3 completed shifts:  In: 2424.7 [I.V.:1254.7; Other:500; NG/GT:120; IV Piggyback:550]  Out: 2500 [Other:2500]    Physical Exam   Constitutional: No distress.   Critically ill   HENT:   Head: Normocephalic and atraumatic.   Endotracheal tube in place   Neck: Neck supple.   Cardiovascular: Normal rate and regular rhythm.   Pulmonary/Chest:   Vent transmitted breath sounds   Abdominal: Soft. He exhibits no distension.   Musculoskeletal: He exhibits deformity (multiple toe amputation). He exhibits no edema.   LUE AVF +thrill/+bruit   Neurological:   Sedated   Skin: Skin is warm and dry. He is not diaphoretic. No erythema.   Trophic changes to extremities   Nursing note and vitals reviewed.      Significant Labs:  CBC:   Recent Labs   Lab 12/13/19  0203   WBC 7.85   RBC 2.79*   HGB 9.3*   HCT 29.0*   *   *   MCH 33.3*   MCHC 32.1     CMP:   Recent Labs   Lab 12/13/19  0203 12/13/19  0830   *  123* 131*   CALCIUM 8.0*  8.0* 7.9*   ALBUMIN 2.8*  2.8* 2.7*   PROT 5.7*  --    *  135* 136   K 4.7  4.7 4.6   CO2 26  26 24   CL 98  98 99   BUN 31*  31* 34*   CREATININE 7.2*  7.2* 7.7*   ALKPHOS 83  --    ALT 15  --    AST 19  --    BILITOT 0.9  --      All labs within the past 24 hours have been reviewed.

## 2019-12-13 NOTE — PROGRESS NOTES
Pharmacokinetic Assessment Follow Up: IV Vancomycin    Assessment and Plan    Vancomycin random level, drawn approximately 21.5 hours post-dose, is 33.4 mcg/mL  ESRD on HD, planning HD today  Hold vancomycin today  Draw random level with AM labs on 12/14      Drug levels (last 3 results):  Recent Labs   Lab Result Units 12/13/19  0203   Vancomycin, Random ug/mL 33.4       Pharmacy will continue to follow and monitor vancomycin. Call for questions regarding this assessment.    Thank you for the consult,   Tasia Robert, PharmD, [facility]  Neurocritical Care Pharmacist  [phone]     Patient brief summary:  Balta Lamb is a 49 y.o. male initiated on antimicrobial therapy with IV Vancomycin for treatment of sepsis    Drug Allergies:   Review of patient's allergies indicates:  No Known Allergies    Actual Body Weight:   77.6 kg    Renal Function:   Estimated Creatinine Clearance: 11.7 mL/min (A) (based on SCr of 7.7 mg/dL (H)).,     Dialysis Method (if applicable):  intermittent HD    Microbiologic Results:  Microbiology Results (last 7 days)     Procedure Component Value Units Date/Time    Culture, Respiratory with Gram Stain [[id number]] Collected:  12/11/19 2023    Order Status:  Completed Specimen:  Respiratory from Sputum, Induced Updated:  12/13/19 0918     Respiratory Culture Further report to follow     Gram Stain (Respiratory) <10 epithelial cells per low power field.     Gram Stain (Respiratory) Rare WBC's     Gram Stain (Respiratory) Few Gram positive cocci     Gram Stain (Respiratory) Rare Gram negative rods    Narrative:       Mini-BAL.    Blood culture [[id number]] Collected:  12/11/19 2017    Order Status:  Completed Specimen:  Blood from Peripheral, Antecubital, Left Updated:  12/12/19 2212     Blood Culture, Routine No Growth to date      No Growth to date    Narrative:       Blood cultures x 2 different sites. 4 bottles total. Please  draw cultures before administering antibiotics.    Blood culture  [[id number]] Collected:  12/11/19 2017    Order Status:  Completed Specimen:  Blood from Peripheral, Upper Arm, Left Updated:  12/12/19 2212     Blood Culture, Routine No Growth to date      No Growth to date    Narrative:       Blood cultures from 2 different sites. 4 bottles total.  Please draw before starting antibiotics.

## 2019-12-13 NOTE — SUBJECTIVE & OBJECTIVE
Interval History:     Seroquel 50 bid  - fentanyl max dose to 250.   - HD today   - Free water 150 q6   - Daily CXR  -given albumin for hypotension     Review of Systems   Unable to perform ROS: Intubated       Objective:     Vitals:  Temp: 98 °F (36.7 °C)  Pulse: 62  Rhythm: normal sinus rhythm  BP: (!) 110/58  MAP (mmHg): 76  Resp: (!) 25  SpO2: 100 %  Oxygen Concentration (%): 50  O2 Device (Oxygen Therapy): ventilator  Vent Mode: A/C  Set Rate: 25 bmp  Vt Set: 420 mL  PEEP/CPAP: 5 cmH20  Peak Airway Pressure: 27 cmH2O  Mean Airway Pressure: 11 cmH20  Plateau Pressure: 0 cmH20    Temp  Min: 97 °F (36.1 °C)  Max: 98.5 °F (36.9 °C)  Pulse  Min: 59  Max: 101  BP  Min: 87/39  Max: 155/63  MAP (mmHg)  Min: 57  Max: 91  Resp  Min: 22  Max: 28  SpO2  Min: 96 %  Max: 100 %  Oxygen Concentration (%)  Min: 50  Max: 50    12/12 0701 - 12/13 0700  In: 924.4 [I.V.:604.4]  Out: -            Physical Exam   Constitutional: He appears well-developed and well-nourished. He is intubated.   HENT:   Head: Normocephalic and atraumatic.   Cardiovascular: Normal rate and regular rhythm.   Pulmonary/Chest: He is intubated.   Abdominal: Soft. Normal appearance and bowel sounds are normal.   Neurological:   Awake, does not follow commands   E1V(T1)M4 GCS (5)   R pupil nonresponive to light   L eye cloudy   Agitation   SAUCEDO spontaneously and against gravity     Medications:  Continuous  dexmedetomidine (PRECEDEX) infusion Last Rate: 1 mcg/kg/hr (12/13/19 0605)   fentanyl    Scheduled  sodium chloride 0.9%  Once   atorvastatin 40 mg Daily   chlorhexidine 15 mL BID   famotidine 20 mg Daily   fentaNYL 100 mcg Once   heparin (porcine) 5,000 Units Q8H   piperacillin-tazobactam 4.5 g in sodium chloride 0.9% 100 mL IVPB (ready to mix system) 4.5 g Q12H   QUEtiapine 50 mg BID   senna-docusate 8.6-50 mg 1 tablet Daily   PRN  acetaminophen 650 mg Q6H PRN   albuterol sulfate 20 mg Q4H PRN   calcium gluconate IVPB 1 g PRN   calcium gluconate IVPB 1 g  PRN   calcium gluconate IVPB 1 g PRN   Dextrose 10% Bolus 12.5 g PRN   Dextrose 10% Bolus 25 g PRN   dextrose 50% 25 g PRN   fentaNYL 50 mcg Q2H PRN   glucagon (human recombinant) 1 mg PRN   glucose 16 g PRN   glucose 24 g PRN   hydrALAZINE 10 mg Q6H PRN   insulin aspart U-100 1-10 Units Q4H PRN   sodium chloride 0.9% 10 mL PRN   vancomycin - pharmacy to dose  pharmacy to manage frequency     Today I personally reviewed pertinent medications, lines/drains/airways, imaging, cardiology results, laboratory results, microbiology results, notably:    Diet  Diet NPO  Diet NPO

## 2019-12-13 NOTE — PROGRESS NOTES
Pt on HD. Dialysis RN at bedside. BP 87/39 with MAP 57. LASHAY Pederson with Neuro critical care notified. Pending orders.

## 2019-12-13 NOTE — ASSESSMENT & PLAN NOTE
End-Stage Renal Disease on HD  -HD session today; will provide dialysis for metabolic clearance and volume management in a TiW fashion  - Dialysate will be adjusted to current labs   - Medications doses to renal parameters  - Strict Input and Output and chart  - Pre/post HD standing weights  - UF ~2-3 L as tolerated      Anemia of Chronic Kidney Disease   - Hb > 7 gm/dL  - Iron studies    Mineral Bone Disease in CKD   - Renal Function Panel Daily for electrolytes monitoring    HTN   - elevated BP, will continue to monitor. Goal for BP per primary team, but below 180 systolics for HD treatments.    Nutrition   - Renal Diet when able    Case discussed with staff further recs with attestation.

## 2019-12-13 NOTE — PROGRESS NOTES
[facility]  Nephrology  Progress Note    Patient Name: Balta Lamb  MRN: [id number]  Admission Date: 12/11/2019  Hospital Length of Stay: 2 days  Attending Provider: Robert Honeycutt MD   Primary Care Physician: Malini Kelly MD  Principal Problem:<principal problem not specified>    Subjective:     HPI: 49 year old White male with ESRD on HD (unknown details/schedule or last HD treatment), HTN, DM2, transferred from OSH and admitted to neurocritical care unit on 12/11/2019 per chart for neurologic evaluation of altered mental status, along with Lt sided numbness.  History is obtained from chart, no family members at bedside, patient sedated on mechanical ventilation.  Per chart, pt was driving with his wife when he noted acute onset left sided numbness.  Wife was able to pull car over and call 911. EMS found pt confused and combative. Pt was transported to OSH where CT Head showed no acute change and encephalomalacia in L frontal and L parietal region from prior craniotomy (unknown reason or diagnosis).  CTA Multiphase showed no acute hemorrhage or major vascular distribution infarct and no evidence high-grade stenosis or major vessel occlusion.  Patient hypoglycemic on arrival to [facility], started on D10 drip.    Nephrology consulted for evaluation of ESRD and HD treatments.    Interval History: Patient evaluated bedside, stable vital signs, night uneventful.  Continues intubated and sedated.  HD session scheduled for today.    Review of patient's allergies indicates:  No Known Allergies  Current Facility-Administered Medications   Medication Frequency    0.9%  NaCl infusion Once    acetaminophen tablet 650 mg Q6H PRN    albuterol sulfate nebulizer solution 20 mg Q4H PRN    atorvastatin tablet 40 mg Daily    calcium gluconate 1g in dextrose 5% 100mL (ready to mix system) PRN    calcium gluconate 1g in dextrose 5% 100mL (ready to mix system) PRN    calcium gluconate 1g in dextrose 5%  100mL (ready to mix system) PRN    chlorhexidine 0.12 % solution 15 mL BID    dexmedetomidine (PRECEDEX) 400mcg/100mL 0.9% NaCL infusion Continuous    dextrose 10% (D10W) Bolus PRN    dextrose 10% (D10W) Bolus PRN    dextrose 50% injection 25 g PRN    famotidine tablet 20 mg Daily    fentaNYL 2500 mcg in 0.9% sodium chloride 250 mL infusion premix (titrating) Continuous    fentaNYL injection 100 mcg Once    fentaNYL injection 50 mcg Q2H PRN    glucagon (human recombinant) injection 1 mg PRN    glucose chewable tablet 16 g PRN    glucose chewable tablet 24 g PRN    heparin (porcine) injection 5,000 Units Q8H    hydrALAZINE injection 10 mg Q6H PRN    insulin aspart U-100 pen 1-10 Units Q4H PRN    piperacillin-tazobactam 4.5 g in sodium chloride 0.9% 100 mL IVPB (ready to mix system) Q12H    QUEtiapine tablet 50 mg BID    senna-docusate 8.6-50 mg per tablet 1 tablet Daily    sodium chloride 0.9% flush 10 mL PRN    vancomycin - pharmacy to dose pharmacy to manage frequency       Objective:     Vital Signs (Most Recent):  Temp: 98 °F (36.7 °C) (12/13/19 0305)  Pulse: 63 (12/13/19 1030)  Resp: (!) 25 (12/13/19 1030)  BP: (!) 87/39 (12/13/19 1030)  SpO2: 100 % (12/13/19 1030)  O2 Device (Oxygen Therapy): ventilator (12/13/19 0740) Vital Signs (24h Range):  Temp:  [96.5 °F (35.8 °C)-98.5 °F (36.9 °C)] 98 °F (36.7 °C)  Pulse:  [] 63  Resp:  [22-28] 25  SpO2:  [96 %-100 %] 100 %  BP: ()/(39-80) 87/39  Arterial Line BP: (146-197)/(44-70) 194/61     Weight: 77.6 kg (171 lb) (12/12/19 1205)  Body mass index is 24.54 kg/m².  Body surface area is 1.96 meters squared.    I/O last 3 completed shifts:  In: 2424.7 [I.V.:1254.7; Other:500; NG/GT:120; IV Piggyback:550]  Out: 2500 [Other:2500]    Physical Exam   Constitutional: No distress.   Critically ill   HENT:   Head: Normocephalic and atraumatic.   Endotracheal tube in place   Neck: Neck supple.   Cardiovascular: Normal rate and regular rhythm.    Pulmonary/Chest:   Vent transmitted breath sounds   Abdominal: Soft. He exhibits no distension.   Musculoskeletal: He exhibits deformity (multiple toe amputation). He exhibits no edema.   LUE AVF +thrill/+bruit   Neurological:   Sedated   Skin: Skin is warm and dry. He is not diaphoretic. No erythema.   Trophic changes to extremities   Nursing note and vitals reviewed.      Significant Labs:  CBC:   Recent Labs   Lab 12/13/19  0203   WBC 7.85   RBC 2.79*   HGB 9.3*   HCT 29.0*   *   *   MCH 33.3*   MCHC 32.1     CMP:   Recent Labs   Lab 12/13/19  0203 12/13/19  0830   *  123* 131*   CALCIUM 8.0*  8.0* 7.9*   ALBUMIN 2.8*  2.8* 2.7*   PROT 5.7*  --    *  135* 136   K 4.7  4.7 4.6   CO2 26  26 24   CL 98  98 99   BUN 31*  31* 34*   CREATININE 7.2*  7.2* 7.7*   ALKPHOS 83  --    ALT 15  --    AST 19  --    BILITOT 0.9  --      All labs within the past 24 hours have been reviewed.       Assessment/Plan:     ESRD (end stage renal disease) on dialysis  Outpatient Nephrologist Dr. Malini Beaver   [facility]  [phone]  [facility] schedule  Access: LUE AVF +thrill/+bruit    End-Stage Renal Disease on HD  -HD session today; will provide dialysis for metabolic clearance and volume management in a TiW fashion  - Dialysate will be adjusted to current labs   - Medications doses to renal parameters  - Strict Input and Output and chart  - Pre/post HD standing weights  - UF ~2-3 L as tolerated      Anemia of Chronic Kidney Disease   - Hb > 7 gm/dL  - Iron studies    Mineral Bone Disease in CKD   - Renal Function Panel Daily for electrolytes monitoring    HTN   - elevated BP, will continue to monitor. Goal for BP per primary team, but below 180 systolics for HD treatments.    Nutrition   - Renal Diet when able    Case discussed with staff further recs with attestation.      Altered mental status  Per primary team        Thank you for your consult. I will follow-up with patient. Please  contact us if you have any additional questions.    Damian Pena MD  Nephrology  [facility]

## 2019-12-13 NOTE — PLAN OF CARE
CM phoned:   Arabella Poole,--  Home Phone: [phone] to ask if she is patient's legal wife.  Some one picked up the phone and hung up.  CM redialed the above number.  No answer. Unidentified voicemail.  No msg left.     Mandie Buchanan RN, CCRN-K, [facility]  Neuro-Critical Care   [phone]

## 2019-12-13 NOTE — ASSESSMENT & PLAN NOTE
-ABG   -Monitor respiration   Vent Mode: A/C  Oxygen Concentration (%):  [50] 50  Resp Rate Total:  [25 br/min-32 br/min] 25 br/min  Vt Set:  [420 mL] 420 mL  PEEP/CPAP:  [5 cmH20] 5 cmH20  Mean Airway Pressure:  [9.5 uqX48-98 cmH20] 11 cmH20

## 2019-12-13 NOTE — PLAN OF CARE
CM received a voicemail from patient's son, Codey [phone] asking for a return call.  CM phoned son x 2.  No answer.  Message stated that no voicemail is set up.     Mandie Buchanan RN, CCRN-K, [facility]  Neuro-Critical Care   [phone]

## 2019-12-13 NOTE — ASSESSMENT & PLAN NOTE
Agitation 2/2 metabolic encephalopathy   -d/c EEG   -acute agitation with correction of glucose   - on fentanyl gtt, precedex    - on scheduled quetiapine 50 BID

## 2019-12-13 NOTE — PLAN OF CARE
POC reviewed with pt at 0500. Pt unable to verbalize understanding due to sedation and intubation. Pt extremely combative and aggressive when alert. Fentanyl and precedex gtt continued. TF continued at 10 mL/hr. Will continue to monitor. See flowsheets for full assessment and VS info       Problem: Inability to Wean (Mechanical Ventilation, Invasive)  Goal: Mechanical Ventilation Liberation  Outcome: Ongoing, Progressing  Intervention: Promote Extubation and Mechanical Ventilation Liberation  Flowsheets (Taken 12/13/2019 0445)  Sleep/Rest Enhancement: awakenings minimized; consistent schedule promoted  Environmental Support: calm environment promoted; caregiver consistency promoted; distractions minimized  Medication Review/Management: medications reviewed     Problem: Skin and Tissue Injury (Artificial Airway)  Goal: Absence of Device-Related Skin or Tissue Injury  Outcome: Ongoing, Progressing  Intervention: Maintain Skin and Tissue Health  Flowsheets (Taken 12/13/2019 0445)  Device Skin Pressure Protection: absorbent pad utilized/changed; pressure points protected; positioning supports utilized; skin-to-skin areas padded

## 2019-12-13 NOTE — PLAN OF CARE
POC reviewed with pt and family (via phone) at 1400. Pt family verbalized understanding. Questions and concerns addressed. No acute events today. Pt progressing toward goals. Will continue to monitor. See flowsheets for full assessment and VS info.

## 2019-12-13 NOTE — ASSESSMENT & PLAN NOTE
- HD fistula in LUE  - Cr 6.4, GFR 10.6   - Nephrology following   - Renally dose medications  - Strict I/Os   - HD today, given albumin for hypotensive episodes

## 2019-12-13 NOTE — ASSESSMENT & PLAN NOTE
49 year old male with HTN, DM2 and ESRD on HD who presented to OSH with acute onset L sided weakness and confusion.     - Neuro checks Q1hr   - Vital checks Q1hr   - Vascular Neurology consulted   - CT Head WO OSH unremarkable for acute infarction or hemorrhage   - CTA Head/neck - no acute change, no high grade stenosis  - SBP <200 mmHg  - EKG NSR , ECHO EF 53%  - SSI  - Q4 POCT Glucose checks  - on fentanyl gtt for agitation    - PT/OT/SLP when appropriate   - MRI showed no acute intracranial abnormalities

## 2019-12-13 NOTE — PROGRESS NOTES
3 Hour HD Tx complete. BP low during Tx. Albumin given. Removed 1.4L. Removed 2 needles from LUIGI AVF. Pressure applied until hemostasis. Gauze applied.

## 2019-12-13 NOTE — ASSESSMENT & PLAN NOTE
- Home glipizide held  - on admit glucose <5, started on D10 drip d/t hypoglycemia   - D10 gtt stopped (12/12)   - (12/13) Glucose 131  - on SSI, POCT glucose q4hrs

## 2019-12-13 NOTE — PROGRESS NOTES
"[facility]  Neurocritical Care  Progress Note    Admit Date: 12/11/2019  Service Date: 12/13/2019  Length of Stay: 2    Subjective:     Chief Complaint: Altered mental status    History of Present Illness: Balta Lamb is a 49 year old male with a medical history significant for HTN, DM2, ESRD (unknown schedule or last HD date) on HD who presents as a transfer from OSH for neurologic evaluation of altered mental status and "L sided numbness". History is obtained from chart as pt is alone and sedated. Per chart, pt was driving with his wife when he noted acute onset left sided numbness. Wife was able to pull car over and call 911. EMS found pt confused and combative. Pt was transported to OSH where CT Head showed no acute change and encephalomalacia in L frontal and L parietal region from prior craniotomy (unknown reason or diagnosis). Routine labs showed a K of 6.2 and Cr of 10.2. OSH ED attempted to shift the pts K with insulin. Pt was started on a Versed infusion and intubated at OSH prior to transfer to [facility] for further evaluation. On arrival, pts glucose was noted to be <20 and K was 3.4. CTA Multiphase showed no acute hemorrhage or major vascular distribution infarct and no evidence high-grade stenosis or major vessel occlusion. Pt was started on D10 drip for hypoglycemia and will be admitted to [facility] for further evalaution and higher level of care.    Hospital Course: 12/12/2019 Echo, Agitated on precedex, requiring fentanyl q2: will reassess agitation later concerning fentanyl drip, Started TF, monitor glucose, SSI   12/13/2019 schedule  Seroquel 50 bid, fentanyl max dose to 250, HD today, Free water 150 q6, Daily CXR            Interval History:     Seroquel 50 bid  - fentanyl max dose to 250.   - HD today   - Free water 150 q6   - Daily CXR  -given albumin for hypotension     Review of Systems   Unable to perform ROS: Intubated       Objective:     Vitals:  Temp: 98 °F (36.7 °C)  Pulse: " 62  Rhythm: normal sinus rhythm  BP: (!) 110/58  MAP (mmHg): 76  Resp: (!) 25  SpO2: 100 %  Oxygen Concentration (%): 50  O2 Device (Oxygen Therapy): ventilator  Vent Mode: A/C  Set Rate: 25 bmp  Vt Set: 420 mL  PEEP/CPAP: 5 cmH20  Peak Airway Pressure: 27 cmH2O  Mean Airway Pressure: 11 cmH20  Plateau Pressure: 0 cmH20    Temp  Min: 97 °F (36.1 °C)  Max: 98.5 °F (36.9 °C)  Pulse  Min: 59  Max: 101  BP  Min: 87/39  Max: 155/63  MAP (mmHg)  Min: 57  Max: 91  Resp  Min: 22  Max: 28  SpO2  Min: 96 %  Max: 100 %  Oxygen Concentration (%)  Min: 50  Max: 50    12/12 0701 - 12/13 0700  In: 924.4 [I.V.:604.4]  Out: -            Physical Exam   Constitutional: He appears well-developed and well-nourished. He is intubated.   HENT:   Head: Normocephalic and atraumatic.   Cardiovascular: Normal rate and regular rhythm.   Pulmonary/Chest: He is intubated.   Abdominal: Soft. Normal appearance and bowel sounds are normal.   Neurological:   Awake, does not follow commands   E1V(T1)M4 GCS (5)   R pupil nonresponive to light   L eye cloudy   Agitation   SAUCEDO spontaneously and against gravity     Medications:  Continuous  dexmedetomidine (PRECEDEX) infusion Last Rate: 1 mcg/kg/hr (12/13/19 0605)   fentanyl    Scheduled  sodium chloride 0.9%  Once   atorvastatin 40 mg Daily   chlorhexidine 15 mL BID   famotidine 20 mg Daily   fentaNYL 100 mcg Once   heparin (porcine) 5,000 Units Q8H   piperacillin-tazobactam 4.5 g in sodium chloride 0.9% 100 mL IVPB (ready to mix system) 4.5 g Q12H   QUEtiapine 50 mg BID   senna-docusate 8.6-50 mg 1 tablet Daily   PRN  acetaminophen 650 mg Q6H PRN   albuterol sulfate 20 mg Q4H PRN   calcium gluconate IVPB 1 g PRN   calcium gluconate IVPB 1 g PRN   calcium gluconate IVPB 1 g PRN   Dextrose 10% Bolus 12.5 g PRN   Dextrose 10% Bolus 25 g PRN   dextrose 50% 25 g PRN   fentaNYL 50 mcg Q2H PRN   glucagon (human recombinant) 1 mg PRN   glucose 16 g PRN   glucose 24 g PRN   hydrALAZINE 10 mg Q6H PRN   insulin  aspart U-100 1-10 Units Q4H PRN   sodium chloride 0.9% 10 mL PRN   vancomycin - pharmacy to dose  pharmacy to manage frequency     Today I personally reviewed pertinent medications, lines/drains/airways, imaging, cardiology results, laboratory results, microbiology results, notably:    Diet  Diet NPO  Diet NPO        Assessment/Plan:     Neuro  Altered mental status  49 year old male with HTN, DM2 and ESRD on HD who presented to OSH with acute onset L sided weakness and confusion.     - Neuro checks Q1hr   - Vital checks Q1hr   - Vascular Neurology consulted   - CT Head WO OSH unremarkable for acute infarction or hemorrhage   - CTA Head/neck - no acute change, no high grade stenosis  - SBP <200 mmHg  - EKG NSR , ECHO EF 53%  - SSI  - Q4 POCT Glucose checks  - on fentanyl gtt for agitation    - PT/OT/SLP when appropriate   - MRI showed no acute intracranial abnormalities   - pan culture pending for increased leukocytosis       Psychiatric  Agitation  Agitation 2/2 metabolic encephalopathy   -d/c EEG   -acute agitation with correction of glucose   - on fentanyl gtt, precedex    - on scheduled quetiapine 50 BID       Pulmonary  On mechanically assisted ventilation  -ABG   -Monitor respiration   Vent Mode: A/C  Oxygen Concentration (%):  [50] 50  Resp Rate Total:  [25 br/min-32 br/min] 25 br/min  Vt Set:  [420 mL] 420 mL  PEEP/CPAP:  [5 cmH20] 5 cmH20  Mean Airway Pressure:  [9.5 xsQ63-74 cmH20] 11 cmH20      Cardiac/Vascular  Essential hypertension  - Hypertensive on admit,    - Goal SBP <200      Renal/  ESRD (end stage renal disease) on dialysis  - HD fistula in E  - Cr 6.4, GFR 10.6   - Nephrology following   - Renally dose medications  - Strict I/Os   - HD today, given albumin for hypotensive episodes     Endocrine  Hypoglycemia, coma  - Present on admission  - OSH attempted to shift HyperK with insulin    - On arrival, pt with glucose <5  - D10 drip gtt stopped (12/12),   - on 12/12 Glucose (248)  -  POCT glucose q4 hour and SSI   - Monitor CMP    Type 2 diabetes mellitus  - Home glipizide held  - on admit glucose <5, started on D10 drip d/t hypoglycemia   - D10 gtt stopped (12/12)   - (12/13) Glucose 131  - on SSI, POCT glucose q4hrs              The patient is being Prophylaxed for:  Venous Thromboembolism with: Mechanical or Chemical  Stress Ulcer with: H2B  Ventilator Pneumonia with: chlorhexidine oral care    Activity Orders          Diet NPO: NPO starting at 12/11 1515        Full Code  I have spent 35 min with this patient, with over 50% of this time spent coordinating care and speaking with the family    Elliot Armenta PA-C  Neurocritical Care  [facility]

## 2019-12-14 LAB
ALBUMIN SERPL BCP-MCNC: 2.6 G/DL (ref 3.5–5.2)
ALLENS TEST: ABNORMAL
ALP SERPL-CCNC: 75 U/L (ref 55–135)
ALP SERPL-CCNC: 75 U/L (ref 55–135)
ALT SERPL W/O P-5'-P-CCNC: 12 U/L (ref 10–44)
ALT SERPL W/O P-5'-P-CCNC: 12 U/L (ref 10–44)
ANION GAP SERPL CALC-SCNC: 8 MMOL/L (ref 8–16)
AST SERPL-CCNC: 13 U/L (ref 10–40)
AST SERPL-CCNC: 13 U/L (ref 10–40)
BASOPHILS # BLD AUTO: 0.02 K/UL (ref 0–0.2)
BASOPHILS # BLD AUTO: 0.02 K/UL (ref 0–0.2)
BASOPHILS NFR BLD: 0.4 % (ref 0–1.9)
BASOPHILS NFR BLD: 0.4 % (ref 0–1.9)
BILIRUB SERPL-MCNC: 0.7 MG/DL (ref 0.1–1)
BILIRUB SERPL-MCNC: 0.7 MG/DL (ref 0.1–1)
BUN SERPL-MCNC: 26 MG/DL (ref 6–20)
CALCIUM SERPL-MCNC: 7.9 MG/DL (ref 8.7–10.5)
CHLORIDE SERPL-SCNC: 108 MMOL/L (ref 95–110)
CO2 SERPL-SCNC: 24 MMOL/L (ref 23–29)
CREAT SERPL-MCNC: 5.3 MG/DL (ref 0.5–1.4)
DELSYS: ABNORMAL
DIFFERENTIAL METHOD: ABNORMAL
DIFFERENTIAL METHOD: ABNORMAL
EOSINOPHIL # BLD AUTO: 0.1 K/UL (ref 0–0.5)
EOSINOPHIL # BLD AUTO: 0.1 K/UL (ref 0–0.5)
EOSINOPHIL NFR BLD: 3 % (ref 0–8)
EOSINOPHIL NFR BLD: 3 % (ref 0–8)
ERYTHROCYTE [DISTWIDTH] IN BLOOD BY AUTOMATED COUNT: 13.5 % (ref 11.5–14.5)
ERYTHROCYTE [DISTWIDTH] IN BLOOD BY AUTOMATED COUNT: 13.5 % (ref 11.5–14.5)
ERYTHROCYTE [SEDIMENTATION RATE] IN BLOOD BY WESTERGREN METHOD: 25 MM/H
EST. GFR  (AFRICAN AMERICAN): 13.4 ML/MIN/1.73 M^2
EST. GFR  (NON AFRICAN AMERICAN): 11.6 ML/MIN/1.73 M^2
FIO2: 50
GLUCOSE SERPL-MCNC: 173 MG/DL (ref 70–110)
HCO3 UR-SCNC: 24.3 MMOL/L (ref 24–28)
HCT VFR BLD AUTO: 25.5 % (ref 40–54)
HCT VFR BLD AUTO: 25.5 % (ref 40–54)
HGB BLD-MCNC: 8 G/DL (ref 14–18)
HGB BLD-MCNC: 8 G/DL (ref 14–18)
IMM GRANULOCYTES # BLD AUTO: 0 K/UL (ref 0–0.04)
IMM GRANULOCYTES # BLD AUTO: 0 K/UL (ref 0–0.04)
IMM GRANULOCYTES NFR BLD AUTO: 0 % (ref 0–0.5)
IMM GRANULOCYTES NFR BLD AUTO: 0 % (ref 0–0.5)
LYMPHOCYTES # BLD AUTO: 0.5 K/UL (ref 1–4.8)
LYMPHOCYTES # BLD AUTO: 0.5 K/UL (ref 1–4.8)
LYMPHOCYTES NFR BLD: 10.8 % (ref 18–48)
LYMPHOCYTES NFR BLD: 10.8 % (ref 18–48)
MAGNESIUM SERPL-MCNC: 2.1 MG/DL (ref 1.6–2.6)
MAGNESIUM SERPL-MCNC: 2.1 MG/DL (ref 1.6–2.6)
MCH RBC QN AUTO: 33.2 PG (ref 27–31)
MCH RBC QN AUTO: 33.2 PG (ref 27–31)
MCHC RBC AUTO-ENTMCNC: 31.4 G/DL (ref 32–36)
MCHC RBC AUTO-ENTMCNC: 31.4 G/DL (ref 32–36)
MCV RBC AUTO: 106 FL (ref 82–98)
MCV RBC AUTO: 106 FL (ref 82–98)
MIN VOL: 10.8
MODE: ABNORMAL
MONOCYTES # BLD AUTO: 0.5 K/UL (ref 0.3–1)
MONOCYTES # BLD AUTO: 0.5 K/UL (ref 0.3–1)
MONOCYTES NFR BLD: 10.1 % (ref 4–15)
MONOCYTES NFR BLD: 10.1 % (ref 4–15)
NEUTROPHILS # BLD AUTO: 3.5 K/UL (ref 1.8–7.7)
NEUTROPHILS # BLD AUTO: 3.5 K/UL (ref 1.8–7.7)
NEUTROPHILS NFR BLD: 75.7 % (ref 38–73)
NEUTROPHILS NFR BLD: 75.7 % (ref 38–73)
NRBC BLD-RTO: 0 /100 WBC
NRBC BLD-RTO: 0 /100 WBC
PCO2 BLDA: 32.7 MMHG (ref 35–45)
PEEP: 5
PH SMN: 7.48 [PH] (ref 7.35–7.45)
PHOSPHATE SERPL-MCNC: 4.2 MG/DL (ref 2.7–4.5)
PIP: 22
PLATELET # BLD AUTO: 107 K/UL (ref 150–350)
PLATELET # BLD AUTO: 107 K/UL (ref 150–350)
PMV BLD AUTO: 10.8 FL (ref 9.2–12.9)
PMV BLD AUTO: 10.8 FL (ref 9.2–12.9)
PO2 BLDA: 221 MMHG (ref 80–100)
POC BE: 1 MMOL/L
POC SATURATED O2: 100 % (ref 95–100)
POC TCO2: 25 MMOL/L (ref 23–27)
POCT GLUCOSE: 100 MG/DL (ref 70–110)
POCT GLUCOSE: 187 MG/DL (ref 70–110)
POCT GLUCOSE: 201 MG/DL (ref 70–110)
POCT GLUCOSE: 202 MG/DL (ref 70–110)
POCT GLUCOSE: 221 MG/DL (ref 70–110)
POCT GLUCOSE: 82 MG/DL (ref 70–110)
POTASSIUM SERPL-SCNC: 4 MMOL/L (ref 3.5–5.1)
PROT SERPL-MCNC: 5.3 G/DL (ref 6–8.4)
PROT SERPL-MCNC: 5.3 G/DL (ref 6–8.4)
RBC # BLD AUTO: 2.41 M/UL (ref 4.6–6.2)
RBC # BLD AUTO: 2.41 M/UL (ref 4.6–6.2)
SAMPLE: ABNORMAL
SITE: ABNORMAL
SODIUM SERPL-SCNC: 140 MMOL/L (ref 136–145)
SP02: 100
VANCOMYCIN SERPL-MCNC: 23.9 UG/ML
VT: 420
WBC # BLD AUTO: 4.65 K/UL (ref 3.9–12.7)
WBC # BLD AUTO: 4.65 K/UL (ref 3.9–12.7)

## 2019-12-14 PROCEDURE — 25000003 PHARM REV CODE 250: Performed by: PSYCHIATRY & NEUROLOGY

## 2019-12-14 PROCEDURE — 99900035 HC TECH TIME PER 15 MIN (STAT)

## 2019-12-14 PROCEDURE — 94010 BREATHING CAPACITY TEST: CPT

## 2019-12-14 PROCEDURE — 84100 ASSAY OF PHOSPHORUS: CPT

## 2019-12-14 PROCEDURE — 82803 BLOOD GASES ANY COMBINATION: CPT

## 2019-12-14 PROCEDURE — 63600175 PHARM REV CODE 636 W HCPCS: Performed by: PSYCHIATRY & NEUROLOGY

## 2019-12-14 PROCEDURE — 94761 N-INVAS EAR/PLS OXIMETRY MLT: CPT

## 2019-12-14 PROCEDURE — 94150 VITAL CAPACITY TEST: CPT

## 2019-12-14 PROCEDURE — 99900017 HC EXTUBATION W/PARAMETERS (STAT)

## 2019-12-14 PROCEDURE — 83735 ASSAY OF MAGNESIUM: CPT

## 2019-12-14 PROCEDURE — 99900026 HC AIRWAY MAINTENANCE (STAT)

## 2019-12-14 PROCEDURE — 63600175 PHARM REV CODE 636 W HCPCS: Performed by: NURSE PRACTITIONER

## 2019-12-14 PROCEDURE — 25000003 PHARM REV CODE 250: Performed by: STUDENT IN AN ORGANIZED HEALTH CARE EDUCATION/TRAINING PROGRAM

## 2019-12-14 PROCEDURE — 94003 VENT MGMT INPAT SUBQ DAY: CPT

## 2019-12-14 PROCEDURE — 37799 UNLISTED PX VASCULAR SURGERY: CPT

## 2019-12-14 PROCEDURE — 20000000 HC ICU ROOM

## 2019-12-14 PROCEDURE — 85025 COMPLETE CBC W/AUTO DIFF WBC: CPT

## 2019-12-14 PROCEDURE — 27000221 HC OXYGEN, UP TO 24 HOURS

## 2019-12-14 PROCEDURE — 87040 BLOOD CULTURE FOR BACTERIA: CPT

## 2019-12-14 PROCEDURE — 25000003 PHARM REV CODE 250: Performed by: NURSE PRACTITIONER

## 2019-12-14 PROCEDURE — 80053 COMPREHEN METABOLIC PANEL: CPT

## 2019-12-14 PROCEDURE — 80202 ASSAY OF VANCOMYCIN: CPT

## 2019-12-14 RX ORDER — POLYETHYLENE GLYCOL 3350 17 G/17G
17 POWDER, FOR SOLUTION ORAL DAILY
Status: DISCONTINUED | OUTPATIENT
Start: 2019-12-14 | End: 2019-12-19

## 2019-12-14 RX ORDER — FENTANYL CITRATE-0.9 % NACL/PF 10 MCG/ML
25 PLASTIC BAG, INJECTION (ML) INTRAVENOUS CONTINUOUS
Status: DISCONTINUED | OUTPATIENT
Start: 2019-12-14 | End: 2019-12-14

## 2019-12-14 RX ORDER — AMOXICILLIN 250 MG
1 CAPSULE ORAL 2 TIMES DAILY
Status: DISCONTINUED | OUTPATIENT
Start: 2019-12-14 | End: 2019-12-19

## 2019-12-14 RX ORDER — AMOXICILLIN 250 MG
1 CAPSULE ORAL DAILY PRN
Status: DISCONTINUED | OUTPATIENT
Start: 2019-12-14 | End: 2019-12-23 | Stop reason: HOSPADM

## 2019-12-14 RX ADMIN — INSULIN ASPART 4 UNITS: 100 INJECTION, SOLUTION INTRAVENOUS; SUBCUTANEOUS at 12:12

## 2019-12-14 RX ADMIN — PIPERACILLIN AND TAZOBACTAM 4.5 G: 4; .5 INJECTION, POWDER, FOR SOLUTION INTRAVENOUS at 03:12

## 2019-12-14 RX ADMIN — ACETAMINOPHEN 650 MG: 325 TABLET ORAL at 07:12

## 2019-12-14 RX ADMIN — SENNOSIDES AND DOCUSATE SODIUM 1 TABLET: 8.6; 5 TABLET ORAL at 09:12

## 2019-12-14 RX ADMIN — QUETIAPINE FUMARATE 50 MG: 25 TABLET ORAL at 09:12

## 2019-12-14 RX ADMIN — INSULIN ASPART 2 UNITS: 100 INJECTION, SOLUTION INTRAVENOUS; SUBCUTANEOUS at 12:12

## 2019-12-14 RX ADMIN — SODIUM CHLORIDE, POTASSIUM CHLORIDE, SODIUM LACTATE AND CALCIUM CHLORIDE 500 ML: 600; 310; 30; 20 INJECTION, SOLUTION INTRAVENOUS at 01:12

## 2019-12-14 RX ADMIN — CHLORHEXIDINE GLUCONATE 0.12% ORAL RINSE 15 ML: 1.2 LIQUID ORAL at 09:12

## 2019-12-14 RX ADMIN — HEPARIN SODIUM 5000 UNITS: 5000 INJECTION, SOLUTION INTRAVENOUS; SUBCUTANEOUS at 02:12

## 2019-12-14 RX ADMIN — CHLORHEXIDINE GLUCONATE 0.12% ORAL RINSE 15 ML: 1.2 LIQUID ORAL at 08:12

## 2019-12-14 RX ADMIN — PIPERACILLIN AND TAZOBACTAM 4.5 G: 4; .5 INJECTION, POWDER, FOR SOLUTION INTRAVENOUS at 02:12

## 2019-12-14 RX ADMIN — ATORVASTATIN CALCIUM 40 MG: 20 TABLET, FILM COATED ORAL at 08:12

## 2019-12-14 RX ADMIN — POLYETHYLENE GLYCOL 3350 17 G: 17 POWDER, FOR SOLUTION ORAL at 12:12

## 2019-12-14 RX ADMIN — QUETIAPINE FUMARATE 50 MG: 25 TABLET ORAL at 08:12

## 2019-12-14 RX ADMIN — HEPARIN SODIUM 5000 UNITS: 5000 INJECTION, SOLUTION INTRAVENOUS; SUBCUTANEOUS at 05:12

## 2019-12-14 RX ADMIN — INSULIN ASPART 4 UNITS: 100 INJECTION, SOLUTION INTRAVENOUS; SUBCUTANEOUS at 05:12

## 2019-12-14 RX ADMIN — HEPARIN SODIUM 5000 UNITS: 5000 INJECTION, SOLUTION INTRAVENOUS; SUBCUTANEOUS at 09:12

## 2019-12-14 RX ADMIN — FAMOTIDINE 20 MG: 20 TABLET ORAL at 08:12

## 2019-12-14 RX ADMIN — SENNOSIDES AND DOCUSATE SODIUM 1 TABLET: 8.6; 5 TABLET ORAL at 08:12

## 2019-12-14 NOTE — PROGRESS NOTES
Pt following commands, awake and alert. MD Yinka at bedside. Pt to be extubated per MD Yinka. JUANPABLO Crowell notified. Pt extubated without incident. Pt O2 sats 100% on 2L NC. Will continue to monitor.

## 2019-12-14 NOTE — PLAN OF CARE
POC reviewed with pt and family . Pt unable to verbalize understanding. Questions and concerns addressed. No acute events today. Pt progressing toward goals. Will continue to monitor. See flowsheets for full assessment and VS info.      Problem: Adult Inpatient Plan of Care  Goal: Plan of Care Review  Outcome: Ongoing, Progressing  Flowsheets (Taken 12/14/2019 0244)  Plan of Care Reviewed With: patient  Goal: Absence of Hospital-Acquired Illness or Injury  Outcome: Ongoing, Progressing  Intervention: Prevent VTE (venous thromboembolism)  Flowsheets (Taken 12/14/2019 0244)  VTE Prevention/Management: remove, assess skin and reapply sequential compression device  Goal: Optimal Comfort and Wellbeing  Outcome: Ongoing, Progressing  Intervention: Provide Person-Centered Care  Flowsheets (Taken 12/14/2019 0244)  Trust Relationship/Rapport: care explained; choices provided     Problem: Device-Related Complication Risk (Hemodialysis)  Goal: Safe, Effective Therapy Delivery  Outcome: Ongoing, Progressing  Intervention: Optimize Device Care and Function  Flowsheets (Taken 12/14/2019 0244)  Medication Review/Management: --     Problem: Hemodynamic Instability (Hemodialysis)  Goal: Vital Signs Remain in Desired Range  Outcome: Ongoing, Progressing  Intervention: Optimize Blood Flow  Flowsheets (Taken 12/14/2019 0244)  Medication Review/Management: --     Problem: Infection (Hemodialysis)  Goal: Absence of Infection Signs/Symptoms  Outcome: Ongoing, Progressing  Intervention: Prevent or Manage Infection  Flowsheets (Taken 12/14/2019 0244)  Infection Prevention: equipment surfaces disinfected  Fever Reduction/Comfort Measures: medication administered     Problem: Diabetes Comorbidity  Goal: Blood Glucose Level Within Desired Range  Outcome: Ongoing, Progressing  Intervention: Maintain Glycemic Control  Flowsheets (Taken 12/14/2019 0244)  Glycemic Management: blood glucose monitoring; supplemental insulin given     Problem: Fall  Injury Risk  Goal: Absence of Fall and Fall-Related Injury  Outcome: Ongoing, Progressing  Intervention: Identify and Manage Contributors to Fall Injury Risk  Flowsheets (Taken 12/14/2019 0244)  Self-Care Promotion: safe use of adaptive equipment encouraged  Medication Review/Management: medications reviewed; dosing adjusted     Problem: Restraint, Nonbehavioral (Nonviolent)  Goal: Discontinuation Criteria Achieved  Outcome: Ongoing, Progressing  Goal: Personal Dignity and Safety Maintained  Outcome: Ongoing, Progressing  Intervention: Protect Dignity, Rights, and Personal Wellbeing  Flowsheets (Taken 12/14/2019 0244)  Trust Relationship/Rapport: care explained; choices provided  Intervention: Restraint Monitoring Q2 hours w/Assessment for Injury  Flowsheets (Taken 12/14/2019 0244)  Observed Emotional State: accepting  Comfort Measures: Oral Care; Repositioned     Problem: Communication Impairment (Mechanical Ventilation, Invasive)  Goal: Effective Communication  Outcome: Ongoing, Progressing     Problem: Skin and Tissue Injury (Mechanical Ventilation, Invasive)  Goal: Absence of Device-Related Skin and Tissue Injury  Outcome: Ongoing, Progressing  Intervention: Maintain Skin and Tissue Health  Flowsheets (Taken 12/14/2019 0244)  Device Skin Pressure Protection: absorbent pad utilized/changed     Problem: Infection  Goal: Infection Symptom Resolution  Outcome: Ongoing, Progressing  Intervention: Prevent or Manage Infection  Flowsheets (Taken 12/14/2019 0244)  Fever Reduction/Comfort Measures: medication administered

## 2019-12-14 NOTE — PROGRESS NOTES
Pharmacokinetic Assessment Follow Up: IV Vancomycin    Assessment and Plan    Vancomycin random level this AM is 23.9 mcg/mL  Completed 3-hr HD on 12/13, this is a post-HD level  Hold vancomycin today  Anuric, no clearance anticipated until next HD session  Will check a level after next HD, no need for AM random       Drug levels (last 3 results):  Recent Labs   Lab Result Units 12/13/19  0203 12/14/19  0114   Vancomycin, Random ug/mL 33.4 23.9       Pharmacy will continue to follow and monitor vancomycin. Call for questions regarding this assessment.    Thank you for the consult,   Tasia Robert, PharmD, [facility]  Neurocritical Care Pharmacist  [phone]     Patient brief summary:  Balta Lamb is a 49 y.o. male initiated on antimicrobial therapy with IV Vancomycin for treatment of sepsis    Drug Allergies:   Review of patient's allergies indicates:  No Known Allergies    Actual Body Weight:   77.6 kg    Renal Function:   Estimated Creatinine Clearance: 17 mL/min (A) (based on SCr of 5.3 mg/dL (H)).,     Dialysis Method (if applicable):  intermittent HD    Microbiologic Results:  Microbiology Results (last 7 days)     Procedure Component Value Units Date/Time    Blood culture [[id number]] Collected:  12/14/19 1225    Order Status:  Sent Specimen:  Blood from Peripheral, Ankle, Right Updated:  12/14/19 1242    Blood culture [[id number]] Collected:  12/14/19 1220    Order Status:  Sent Specimen:  Blood from Peripheral, Foot, Left Updated:  12/14/19 1240    Culture, Anaerobe [[id number]]     Order Status:  No result Specimen:  Sputum     Aerobic culture [[id number]]     Order Status:  No result Specimen:  Sputum     Culture, Respiratory with Gram Stain [[id number]]  (Abnormal) Collected:  12/11/19 2023    Order Status:  Completed Specimen:  Respiratory from Sputum, Induced Updated:  12/14/19 0822     Respiratory Culture No Pseudomonas isolated.      STAPHYLOCOCCUS AUREUS  Few  Susceptibility pending  Normal respiratory eliane  also present       Gram Stain (Respiratory) <10 epithelial cells per low power field.     Gram Stain (Respiratory) Rare WBC's     Gram Stain (Respiratory) Few Gram positive cocci     Gram Stain (Respiratory) Rare Gram negative rods    Narrative:       Mini-BAL.    Blood culture [[id number]] Collected:  12/11/19 2017    Order Status:  Completed Specimen:  Blood from Peripheral, Upper Arm, Left Updated:  12/13/19 2212     Blood Culture, Routine No Growth to date      No Growth to date      No Growth to date    Narrative:       Blood cultures from 2 different sites. 4 bottles total.  Please draw before starting antibiotics.    Blood culture [[id number]] Collected:  12/11/19 2017    Order Status:  Completed Specimen:  Blood from Peripheral, Antecubital, Left Updated:  12/13/19 2212     Blood Culture, Routine No Growth to date      No Growth to date      No Growth to date    Narrative:       Blood cultures x 2 different sites. 4 bottles total. Please  draw cultures before administering antibiotics.

## 2019-12-14 NOTE — PROGRESS NOTES
Pt awake, alert. Spontaneous movements to all extremeties. Follows commands. [facility]. No new orders at this time. Will continue to monitor.

## 2019-12-14 NOTE — PROGRESS NOTES
Pt MAP 61-63. LASHAY Pederson with [facility] notified. MD Yinka and LASHAY Armenta at bedside. Fentanyl drip discontinued and 500cc bolus of LR given per MD orders. Ventilator switched to spontaneous setting by MD Yinka. Will continue to monitor.

## 2019-12-14 NOTE — PROGRESS NOTES
Blood cultures obtained and sent to lab. Urinalysis unable to be obtained. Pt bladder scanned with no result found and no urine able to obtained via straight cath. LASHAY Pederson with Neuro Critical Care notified. No new orders at this time. Will continue to monitor and reassess via bladder scan.

## 2019-12-14 NOTE — PT/OT/SLP PROGRESS
Physical Therapy  One Discipline Only    Patient Name:  Balta Lamb   MRN:  [id number]  Admitting Diagnosis: Altered mental status  Recent Surgery: * No surgery found *      Plan:     Physical Therapy orders received and acknowledged. Patient is intubated and appropriate for one discipline only. Occupational Therapy to follow and inform PT when appropriate for two disciplines.    Noemy Kemp PT, DPT  12/14/2019   Pager: [phone]

## 2019-12-15 PROBLEM — Z99.11 ON MECHANICALLY ASSISTED VENTILATION: Status: RESOLVED | Noted: 2019-12-12 | Resolved: 2019-12-15

## 2019-12-15 LAB
ABO + RH BLD: NORMAL
ALBUMIN SERPL BCP-MCNC: 2.6 G/DL (ref 3.5–5.2)
ALP SERPL-CCNC: 72 U/L (ref 55–135)
ALT SERPL W/O P-5'-P-CCNC: 12 U/L (ref 10–44)
ANION GAP SERPL CALC-SCNC: 12 MMOL/L (ref 8–16)
AST SERPL-CCNC: 13 U/L (ref 10–40)
BASOPHILS # BLD AUTO: 0.01 K/UL (ref 0–0.2)
BASOPHILS NFR BLD: 0.3 % (ref 0–1.9)
BILIRUB SERPL-MCNC: 0.6 MG/DL (ref 0.1–1)
BLD GP AB SCN CELLS X3 SERPL QL: NORMAL
BUN SERPL-MCNC: 39 MG/DL (ref 6–20)
CALCIUM SERPL-MCNC: 7.7 MG/DL (ref 8.7–10.5)
CHLORIDE SERPL-SCNC: 108 MMOL/L (ref 95–110)
CO2 SERPL-SCNC: 22 MMOL/L (ref 23–29)
CREAT SERPL-MCNC: 7.1 MG/DL (ref 0.5–1.4)
DIFFERENTIAL METHOD: ABNORMAL
EOSINOPHIL # BLD AUTO: 0.1 K/UL (ref 0–0.5)
EOSINOPHIL NFR BLD: 3 % (ref 0–8)
ERYTHROCYTE [DISTWIDTH] IN BLOOD BY AUTOMATED COUNT: 13.7 % (ref 11.5–14.5)
EST. GFR  (AFRICAN AMERICAN): 9.4 ML/MIN/1.73 M^2
EST. GFR  (NON AFRICAN AMERICAN): 8.1 ML/MIN/1.73 M^2
GLUCOSE SERPL-MCNC: 109 MG/DL (ref 70–110)
HCT VFR BLD AUTO: 27.3 % (ref 40–54)
HGB BLD-MCNC: 8.7 G/DL (ref 14–18)
IMM GRANULOCYTES # BLD AUTO: 0.01 K/UL (ref 0–0.04)
IMM GRANULOCYTES NFR BLD AUTO: 0.3 % (ref 0–0.5)
LYMPHOCYTES # BLD AUTO: 0.5 K/UL (ref 1–4.8)
LYMPHOCYTES NFR BLD: 12 % (ref 18–48)
MAGNESIUM SERPL-MCNC: 2.2 MG/DL (ref 1.6–2.6)
MCH RBC QN AUTO: 33.7 PG (ref 27–31)
MCHC RBC AUTO-ENTMCNC: 31.9 G/DL (ref 32–36)
MCV RBC AUTO: 106 FL (ref 82–98)
MONOCYTES # BLD AUTO: 0.5 K/UL (ref 0.3–1)
MONOCYTES NFR BLD: 12 % (ref 4–15)
NEUTROPHILS # BLD AUTO: 2.9 K/UL (ref 1.8–7.7)
NEUTROPHILS NFR BLD: 72.4 % (ref 38–73)
NRBC BLD-RTO: 0 /100 WBC
PHOSPHATE SERPL-MCNC: 5.9 MG/DL (ref 2.7–4.5)
PLATELET # BLD AUTO: 108 K/UL (ref 150–350)
PMV BLD AUTO: 11.5 FL (ref 9.2–12.9)
POCT GLUCOSE: 101 MG/DL (ref 70–110)
POCT GLUCOSE: 69 MG/DL (ref 70–110)
POCT GLUCOSE: 71 MG/DL (ref 70–110)
POCT GLUCOSE: 78 MG/DL (ref 70–110)
POCT GLUCOSE: 82 MG/DL (ref 70–110)
POCT GLUCOSE: 95 MG/DL (ref 70–110)
POTASSIUM SERPL-SCNC: 4.6 MMOL/L (ref 3.5–5.1)
PROT SERPL-MCNC: 5.2 G/DL (ref 6–8.4)
RBC # BLD AUTO: 2.58 M/UL (ref 4.6–6.2)
SODIUM SERPL-SCNC: 142 MMOL/L (ref 136–145)
WBC # BLD AUTO: 4 K/UL (ref 3.9–12.7)

## 2019-12-15 PROCEDURE — 92610 EVALUATE SWALLOWING FUNCTION: CPT

## 2019-12-15 PROCEDURE — 25000003 PHARM REV CODE 250: Performed by: STUDENT IN AN ORGANIZED HEALTH CARE EDUCATION/TRAINING PROGRAM

## 2019-12-15 PROCEDURE — 80053 COMPREHEN METABOLIC PANEL: CPT

## 2019-12-15 PROCEDURE — 85025 COMPLETE CBC W/AUTO DIFF WBC: CPT

## 2019-12-15 PROCEDURE — 63600175 PHARM REV CODE 636 W HCPCS: Performed by: NURSE PRACTITIONER

## 2019-12-15 PROCEDURE — 25000003 PHARM REV CODE 250: Performed by: PSYCHIATRY & NEUROLOGY

## 2019-12-15 PROCEDURE — 86850 RBC ANTIBODY SCREEN: CPT

## 2019-12-15 PROCEDURE — 99900035 HC TECH TIME PER 15 MIN (STAT)

## 2019-12-15 PROCEDURE — 97535 SELF CARE MNGMENT TRAINING: CPT

## 2019-12-15 PROCEDURE — 84100 ASSAY OF PHOSPHORUS: CPT

## 2019-12-15 PROCEDURE — 94761 N-INVAS EAR/PLS OXIMETRY MLT: CPT

## 2019-12-15 PROCEDURE — 99291 CRITICAL CARE FIRST HOUR: CPT | Mod: GC,,, | Performed by: PSYCHIATRY & NEUROLOGY

## 2019-12-15 PROCEDURE — 83735 ASSAY OF MAGNESIUM: CPT

## 2019-12-15 PROCEDURE — 11000001 HC ACUTE MED/SURG PRIVATE ROOM

## 2019-12-15 PROCEDURE — 63600175 PHARM REV CODE 636 W HCPCS: Performed by: PSYCHIATRY & NEUROLOGY

## 2019-12-15 PROCEDURE — 27000221 HC OXYGEN, UP TO 24 HOURS

## 2019-12-15 PROCEDURE — 63600175 PHARM REV CODE 636 W HCPCS: Performed by: HOSPITALIST

## 2019-12-15 PROCEDURE — 99291 PR CRITICAL CARE, E/M 30-74 MINUTES: ICD-10-PCS | Mod: GC,,, | Performed by: PSYCHIATRY & NEUROLOGY

## 2019-12-15 PROCEDURE — 63600175 PHARM REV CODE 636 W HCPCS: Performed by: STUDENT IN AN ORGANIZED HEALTH CARE EDUCATION/TRAINING PROGRAM

## 2019-12-15 RX ORDER — HYDRALAZINE HYDROCHLORIDE 20 MG/ML
10 INJECTION INTRAMUSCULAR; INTRAVENOUS EVERY 8 HOURS PRN
Status: DISCONTINUED | OUTPATIENT
Start: 2019-12-15 | End: 2019-12-23 | Stop reason: HOSPADM

## 2019-12-15 RX ORDER — AMLODIPINE BESYLATE 5 MG/1
5 TABLET ORAL ONCE
Status: DISCONTINUED | OUTPATIENT
Start: 2019-12-15 | End: 2019-12-17

## 2019-12-15 RX ORDER — CEFTRIAXONE 1 G/1
1 INJECTION, POWDER, FOR SOLUTION INTRAMUSCULAR; INTRAVENOUS
Status: DISCONTINUED | OUTPATIENT
Start: 2019-12-15 | End: 2019-12-15

## 2019-12-15 RX ADMIN — SENNOSIDES AND DOCUSATE SODIUM 1 TABLET: 8.6; 5 TABLET ORAL at 08:12

## 2019-12-15 RX ADMIN — ATORVASTATIN CALCIUM 40 MG: 20 TABLET, FILM COATED ORAL at 08:12

## 2019-12-15 RX ADMIN — QUETIAPINE FUMARATE 50 MG: 25 TABLET ORAL at 10:12

## 2019-12-15 RX ADMIN — FENTANYL CITRATE 50 MCG: 50 INJECTION INTRAMUSCULAR; INTRAVENOUS at 04:12

## 2019-12-15 RX ADMIN — HEPARIN SODIUM 5000 UNITS: 5000 INJECTION, SOLUTION INTRAVENOUS; SUBCUTANEOUS at 10:12

## 2019-12-15 RX ADMIN — HEPARIN SODIUM 5000 UNITS: 5000 INJECTION, SOLUTION INTRAVENOUS; SUBCUTANEOUS at 05:12

## 2019-12-15 RX ADMIN — CEFTRIAXONE SODIUM 1 G: 1 INJECTION, POWDER, FOR SOLUTION INTRAMUSCULAR; INTRAVENOUS at 11:12

## 2019-12-15 RX ADMIN — CHLORHEXIDINE GLUCONATE 0.12% ORAL RINSE 15 ML: 1.2 LIQUID ORAL at 08:12

## 2019-12-15 RX ADMIN — PIPERACILLIN AND TAZOBACTAM 4.5 G: 4; .5 INJECTION, POWDER, FOR SOLUTION INTRAVENOUS at 02:12

## 2019-12-15 RX ADMIN — HYDRALAZINE HYDROCHLORIDE 10 MG: 20 INJECTION INTRAMUSCULAR; INTRAVENOUS at 06:12

## 2019-12-15 RX ADMIN — FAMOTIDINE 20 MG: 20 TABLET ORAL at 08:12

## 2019-12-15 RX ADMIN — POLYETHYLENE GLYCOL 3350 17 G: 17 POWDER, FOR SOLUTION ORAL at 08:12

## 2019-12-15 RX ADMIN — SENNOSIDES AND DOCUSATE SODIUM 1 TABLET: 8.6; 5 TABLET ORAL at 10:12

## 2019-12-15 RX ADMIN — QUETIAPINE FUMARATE 50 MG: 25 TABLET ORAL at 08:12

## 2019-12-15 RX ADMIN — HEPARIN SODIUM 5000 UNITS: 5000 INJECTION, SOLUTION INTRAVENOUS; SUBCUTANEOUS at 02:12

## 2019-12-15 NOTE — SUBJECTIVE & OBJECTIVE
Interval History:  NAEON. Extubated yesterday without issue. Precedex stopped. De-escalated from Zosyn to Ceftriaxone.  Objective:     Vitals:  Temp: 97.7 °F (36.5 °C)  Pulse: 76  Rhythm: normal sinus rhythm  BP: (!) 175/68  MAP (mmHg): 96  Resp: (!) 22  SpO2: 100 %  Oxygen Concentration (%): 28  O2 Device (Oxygen Therapy): nasal cannula    Temp  Min: 97.7 °F (36.5 °C)  Max: 99 °F (37.2 °C)  Pulse  Min: 57  Max: 86  BP  Min: 115/47  Max: 195/70  MAP (mmHg)  Min: 68  Max: 107  Resp  Min: 10  Max: 22  SpO2  Min: 100 %  Max: 100 %  Oxygen Concentration (%)  Min: 28  Max: 40    12/14 0701 - 12/15 0700  In: 1355.8 [I.V.:520.8]  Out: -            Physical Exam   Constitutional: He is oriented to person, place, and time. He appears well-developed and well-nourished.   HENT:   Head: Normocephalic and atraumatic.   Eyes: Pupils are equal, round, and reactive to light. EOM are normal.   Neck: Normal range of motion. Neck supple.   Cardiovascular: Normal rate and regular rhythm.   Pulmonary/Chest: Effort normal and breath sounds normal.   Abdominal: Soft. Bowel sounds are normal. He exhibits no distension. There is no tenderness.   Neurological: He is alert and oriented to person, place, and time.   Skin: Skin is warm and dry.       Medications:  Continuous Scheduled  sodium chloride 0.9%  Once   atorvastatin 40 mg Daily   cefTRIAXone (ROCEPHIN) IVPB 1 g Q12H   fentaNYL 100 mcg Once   heparin (porcine) 5,000 Units Q8H   polyethylene glycol 17 g Daily   QUEtiapine 50 mg BID   senna-docusate 8.6-50 mg 1 tablet BID   PRN  acetaminophen 650 mg Q6H PRN   albuterol sulfate 20 mg Q4H PRN   calcium gluconate IVPB 1 g PRN   calcium gluconate IVPB 1 g PRN   calcium gluconate IVPB 1 g PRN   Dextrose 10% Bolus 12.5 g PRN   Dextrose 10% Bolus 25 g PRN   dextrose 50% 25 g PRN   fentaNYL 50 mcg Q2H PRN   glucagon (human recombinant) 1 mg PRN   glucose 16 g PRN   glucose 24 g PRN   hydrALAZINE 10 mg Q6H PRN   insulin aspart U-100 1-10 Units  Q4H PRN   senna-docusate 8.6-50 mg 1 tablet Daily PRN   sodium chloride 0.9% 10 mL PRN   vancomycin - pharmacy to dose  pharmacy to manage frequency       Diet  Diet NPO  Diet NPO

## 2019-12-15 NOTE — PLAN OF CARE
Goals remain appropriate.  KYLER Ochoa  12/15/2019    Problem: Occupational Therapy Goal  Goal: Occupational Therapy Goal  Description  Goals set 12/15 to be addressed for 14 days with expiration date, 12/29:  Patient will increase functional independence with ADLs by performing:    Patient will demonstrate rolling to the right with SBA.  Not met   Patient will demonstrate rolling to the left with SBA.   Not met  Patient will demonstrate supine -sit with CGA.   Not met  Patient will demonstrate stand pivot transfers with min assist.   Not met  Patient will demonstrate grooming while seated with CGA.   Not met  Patient will demonstrate upper body dressing with min assist while seated EOB.   Not met  Patient will demonstrate lower body dressing with min assist while seated EOB.   Not met  Patient will demonstrate toileting with min assist.   Not met  Patient will demonstrate bathing while seated EOB with min assist.   Not met  Patient's family / caregiver will demonstrate independence and safety with assisting patient with self-care skills and functional mobility.     Not met        Outcome: Ongoing, Progressing

## 2019-12-15 NOTE — PLAN OF CARE
Problem: SLP Goal  Goal: SLP Goal  Description  Speech Language Pathology Goals  Goals expected to be met by 12/22  1. Ongoing swallow assessment     Outcome: Ongoing, Progressing    SLP Clinical Swallow Evaluation completed. See note for details.

## 2019-12-15 NOTE — ASSESSMENT & PLAN NOTE
- Home glipizide held  - on admit glucose <5, started on D10 drip d/t hypoglycemia   - D10 gtt stopped (12/12)   - (12/13) Glucose 131  - on SSI, POCT glucose q4hrs    - stable

## 2019-12-15 NOTE — PROGRESS NOTES
"[facility]  Neurocritical Care  Progress Note    Admit Date: 12/11/2019  Service Date: 12/15/2019  Length of Stay: 4    Subjective:     Chief Complaint: Altered mental status    History of Present Illness: Balta Lamb is a 49 year old male with a medical history significant for HTN, DM2, ESRD (unknown schedule or last HD date) on HD who presents as a transfer from OSH for neurologic evaluation of altered mental status and "L sided numbness". History is obtained from chart as pt is alone and sedated. Per chart, pt was driving with his wife when he noted acute onset left sided numbness. Wife was able to pull car over and call 911. EMS found pt confused and combative. Pt was transported to OSH where CT Head showed no acute change and encephalomalacia in L frontal and L parietal region from prior craniotomy (unknown reason or diagnosis). Routine labs showed a K of 6.2 and Cr of 10.2. OSH ED attempted to shift the pts K with insulin. Pt was started on a Versed infusion and intubated at OSH prior to transfer to [facility] [place] evaluation. On arrival, pts glucose was noted to be <20 and K was 3.4. CTA Multiphase showed no acute hemorrhage or major vascular distribution infarct and no evidence high-grade stenosis or major vessel occlusion. Pt was started on D10 drip for hypoglycemia and will be admitted to [facility] for further evalaution and higher level of care.    Hospital Course: 12/12/2019 Echo, Agitated on precedex, requiring fentanyl q2: will reassess agitation later concerning fentanyl drip, Started TF, monitor glucose, SSI   12/13/2019 schedule  Seroquel 50 bid, fentanyl max dose to 250, HD today, Free water 150 q6, Daily CXR  12/14/19 extubated without issue            Interval History:  Extubated yesterday without issue. Zosyn de-escalated to ceftriaxone. Encephalopathy resolved.     Objective:     Vitals:  Temp: 97.7 °F (36.5 °C)  Pulse: 76  Rhythm: normal sinus rhythm  BP: (!) 175/68  MAP " (mmHg): 96  Resp: (!) 22  SpO2: 100 %  Oxygen Concentration (%): 28  O2 Device (Oxygen Therapy): nasal cannula    Temp  Min: 97.7 °F (36.5 °C)  Max: 99 °F (37.2 °C)  Pulse  Min: 57  Max: 86  BP  Min: 115/47  Max: 195/70  MAP (mmHg)  Min: 68  Max: 107  Resp  Min: 10  Max: 22  SpO2  Min: 100 %  Max: 100 %  Oxygen Concentration (%)  Min: 28  Max: 40    12/14 0701 - 12/15 0700  In: 1355.8 [I.V.:520.8]  Out: -            Physical Exam   Constitutional: He is oriented to person, place, and time. He appears well-developed and well-nourished.   HENT:   Head: Normocephalic and atraumatic.   Eyes: Pupils are equal, round, and reactive to light. EOM are normal.   Neck: Normal range of motion. Neck supple.   Cardiovascular: Normal rate and regular rhythm.   Pulmonary/Chest: Effort normal and breath sounds normal.   Abdominal: Soft. Bowel sounds are normal. He exhibits no distension. There is no tenderness.   Neurological: He is alert and oriented to person, place, and time.   Skin: Skin is warm and dry.         Medications:  Continuous Scheduled  sodium chloride 0.9%  Once   atorvastatin 40 mg Daily   cefTRIAXone (ROCEPHIN) IVPB 1 g Q12H   fentaNYL 100 mcg Once   heparin (porcine) 5,000 Units Q8H   polyethylene glycol 17 g Daily   QUEtiapine 50 mg BID   senna-docusate 8.6-50 mg 1 tablet BID   PRN  acetaminophen 650 mg Q6H PRN   albuterol sulfate 20 mg Q4H PRN   calcium gluconate IVPB 1 g PRN   calcium gluconate IVPB 1 g PRN   calcium gluconate IVPB 1 g PRN   Dextrose 10% Bolus 12.5 g PRN   Dextrose 10% Bolus 25 g PRN   dextrose 50% 25 g PRN   fentaNYL 50 mcg Q2H PRN   glucagon (human recombinant) 1 mg PRN   glucose 16 g PRN   glucose 24 g PRN   hydrALAZINE 10 mg Q6H PRN   insulin aspart U-100 1-10 Units Q4H PRN   senna-docusate 8.6-50 mg 1 tablet Daily PRN   sodium chloride 0.9% 10 mL PRN   vancomycin - pharmacy to dose  pharmacy to manage frequency       Diet  Diet NPO  Diet NPO      Assessment/Plan:     Neuro  * Altered  mental status  49 year old male with HTN, DM2 and ESRD on HD who presented to OSH with acute onset L sided weakness and confusion.     - Neuro checks Q1hr   - Vital checks Q1hr   - CT Head WO OSH unremarkable for acute infarction or hemorrhage   - CTA Head/neck - no acute change, no high grade stenosis  - SBP <200 mmHg  - EKG NSR , ECHO EF 53%  - SSI  - Q4 POCT Glucose checks    - PT/OT/SLP  - MRI showed no acute intracranial abnormalities       Psychiatric  Agitation  Agitation 2/2 metabolic encephalopathy   -d/c EEG   -acute agitation corrected with glucose control  - fentanyl gtt, precedex stopped  - on scheduled quetiapine 50 BID       Cardiac/Vascular  Essential hypertension  - Hypertensive on admit,    - Goal SBP <200      Renal/  ESRD (end stage renal disease) on dialysis  - HD fistula in E  - Nephrology following   - Renally dose medications  - Strict I/Os   - HD 12/13, scheduled MWF    Endocrine  Hypoglycemia, coma  - Present on admission  - OSH attempted to shift HyperK with insulin    - On arrival, pt with glucose <5  - D10 drip gtt stopped (12/12),   - on 12/12 Glucose (248)  - POCT glucose q4 hour and SSI   - Monitor CMP    Type 2 diabetes mellitus  - Home glipizide held  - on admit glucose <5, started on D10 drip d/t hypoglycemia   - D10 gtt stopped (12/12)   - (12/13) Glucose 131  - on SSI, POCT glucose q4hrs    - stable             The patient is being Prophylaxed for:  Venous Thromboembolism with: Chemical  Stress Ulcer with: None  Ventilator Pneumonia with: not applicable    Activity Orders          Activity as tolerated starting at 12/15 0930    Diet NPO: NPO starting at 12/11 1515        Full Code    Safia Kinsey MD  Neurocritical Care  [facility]

## 2019-12-15 NOTE — PLAN OF CARE
POC reviewed with pt at 1700. Pt verbalized understanding. Questions and concerns addressed. No acute events today. Pt progressing toward goals. Will continue to monitor. See flowsheets for full assessment and VS info.      Pt extubated today to 2L NC. See notes.

## 2019-12-15 NOTE — PROGRESS NOTES
"         12/15/2019  Hospital Medicine  Transfer to [facility] note    12/15/2019      Admit Date: 12/11/2019             LOS: 4 days     SUBJECTIVE:     Follow-up For: Altered mental status    HPI/Interval history (See H&P for complete P,F,SHx) :   This is a 49 y.o. male     Balta Lamb is a 49 y.o. male year old male with PMH HTN, DM2, ESRD on HD who presented as a transfer from OSH for neurologic evaluation of AMS and "L sided numbness".  Per chart, pt was driving with his wife when he noted acute onset left sided numbness. Wife was able to pull car over and call 911. EMS found pt confused and combative. Pt was transported to OSH where CT Head showed no acute change and encephalomalacia in L frontal and L parietal region from prior craniotomy (unknown reason or diagnosis). Routine labs showed a K of 6.2 and Cr of 10.2. OSH ED attempted to shift the pts K with insulin. Pt was started on a Versed infusion and intubated at OSH prior to transfer to [facility] for further evaluation. On arrival, pts glucose was noted to be <5 and K was 3.4. CTA Multiphase showed no acute hemorrhage or major vascular distribution infarct and no evidence high-grade stenosis or major vessel occlusion. Pt was started on D10 drip for hypoglycemia and will be admitted to [facility] for further evalaution and higher level of care.     Hospital Course By Problem with Pertinent Findings:       Presented w hypoglycemia, ESRD to another hospital,  After shifting potassium, corrected now and resolved.  MS back to baseline,     No stroke, no  ICB.  HD MWF, nephrology following  Extubated yesterday 12/14.     Consultants and Procedures:   MRI, CT,EEG - all wilian for acute neuro patology     Transfer Information:      Diet:  NPO, advance as tolerated     Physical Activity:  PT/OT     I & O (Last 24H):    Intake/Output Summary (Last 24 hours) at 12/15/2019 1252  Last data filed at 12/15/2019 1100  Gross per 24 hour   Intake 1095.52 ml   Output --   Net 1095.52 ml "       OBJECTIVE:       Vitals:    12/15/19 0902 12/15/19 1000 12/15/19 1100 12/15/19 1200   BP: (!) 175/68 (!) 183/78 (!) 175/70 (!) 182/76   BP Location:   Right arm Right arm   Patient Position:   Lying Lying   Pulse: 76 93 85 88   Resp: (!) 22 20 16 16   Temp:   98.7 °F (37.1 °C)    TempSrc:   Oral    SpO2: 100% 100% 100% 100%   Weight:       Height:           Continuous Infusions:  Scheduled Meds:   sodium chloride 0.9%   Intravenous Once    atorvastatin  40 mg Per OG tube Daily    cefTRIAXone (ROCEPHIN) IVPB  1 g Intravenous Q12H    fentaNYL  100 mcg Intravenous Once    heparin (porcine)  5,000 Units Subcutaneous Q8H    polyethylene glycol  17 g Per NG tube Daily    QUEtiapine  50 mg Per OG tube BID    senna-docusate 8.6-50 mg  1 tablet Per OG tube BID     PRN Meds:acetaminophen, albuterol sulfate, calcium gluconate IVPB, calcium gluconate IVPB, calcium gluconate IVPB, Dextrose 10% Bolus, Dextrose 10% Bolus, dextrose 50%, fentaNYL, glucagon (human recombinant), glucose, glucose, hydrALAZINE, insulin aspart U-100, senna-docusate 8.6-50 mg, sodium chloride 0.9%, Pharmacy to dose Vancomycin consult **AND** vancomycin - pharmacy to dose    Chemistry:  Recent Labs     12/13/19 2140 12/14/19  0114 12/15/19  0123    140  140  140  140 142   K 4.0 4.0  4.0  4.0  4.0 4.6    108  108  108  108 108   CO2 24 24  24  24  24 22*   BUN 23* 26*  26*  26*  26* 39*   CREATININE 5.0* 5.3*  5.3*  5.3*  5.3* 7.1*   CALCIUM 8.1* 7.9*  7.9*  7.9*  7.9* 7.7*     Recent Labs     12/13/19  0203  12/13/19  2140 12/14/19  0114 12/15/19  0123   AST 19  --   --  13  13 13   ALT 15  --   --  12  12 12   ALKPHOS 83  --   --  75  75 72   ALBUMIN 2.8*  2.8*   < > 2.7* 2.6*  2.6*  2.6*  2.6* 2.6*   PROT 5.7*  --   --  5.3*  5.3* 5.2*   BILITOT 0.9  --   --  0.7  0.7 0.6    < > = values in this interval not displayed.         CBC:  Recent Labs   Lab 12/13/19  0203 12/14/19  0114 12/15/19  0123    WBC 7.85 4.65  4.65 4.00   HGB 9.3* 8.0*  8.0* 8.7*   HCT 29.0* 25.5*  25.5* 27.3*   * 107*  107* 108*   MONO 6.4  0.5 10.1  10.1  0.5  0.5 12.0  0.5         ASSESSMENT/PLAN:     Altered mental status  49 year old male with HTN, DM2 and ESRD on HD who presented to OSH with acute onset L sided weakness and confusion.     - CT Head WO OSH unremarkable for acute infarction or hemorrhage   - CTA Head/neck - no acute change, no high grade stenosis  - SBP <200 mmHg  - EKG NSR , ECHO EF 53%  - SSI  - Q4 POCT Glucose checks  - MRI showed no acute intracranial abnormalities      Agitation  Agitation 2/2 metabolic encephalopathy   -d/c EEG   -acute agitation with correction of glucose   - stopped fentanyl gtt, precedex    - on scheduled quetiapine 50 BID      Pulm  MRSA in sputum  Started on zosyn, de-escalated to ceftriaxone 12/15  11/15 -felt to be colonization , but pt ws on a ventilator.  willl treat w vanc w HD x 2 weeks     Essential hypertension  - Hypertensive on admit,    - Goal SBP <200        ESRD (end stage renal disease) on dialysis  - HD fistula in LUE  - Nephrology following   - Renally dose medications  - Strict I/Os   - HD 12/13, scheduled MWF      Hypoglycemia, coma  - Present on admission  - OSH attempted to shift HyperK with insulin    - On arrival, pt with glucose <5  - D10 drip gtt stopped (12/12),   - on 12/12 Glucose (248)  - POCT glucose q4 hour and SSI   - Monitor CMP     Type 2 diabetes mellitus  - Home glipizide held  - on admit glucose <5, started on D10 drip d/t hypoglycemia   - D10 gtt stopped (12/12)   - (12/13) Glucose 131  - on SSI, POCT glucose q4hrs      Recent Labs     12/14/19  1756 12/14/19  2129 12/15/19  0137 12/15/19  0513 12/15/19  0724 12/15/19  1209   POCTGLUCOSE 82 100 101 82 78 69*            Ilda Talley MD  Senior Hospitalist  [facility]  [id number], [phone]

## 2019-12-15 NOTE — PROGRESS NOTES
Pharmacokinetic Assessment Follow Up: IV Vancomycin    Assessment and Plan    Patient is currently on vancomycin- day 4  Pulse dosing vanc in ESRD patient  Last HD 12/13- post HD level was 23.9 mcg/mL  Pt is anuric therefore do not anticipate much/any clearance until next HD  No vancomycin dose indicated today  12/11 sputum culture growing MRSA  Can check random level with AM labs on 12/16 as next HD date is currently unknown    Drug levels (last 3 results):  Recent Labs   Lab Result Units 12/13/19  0203 12/14/19  0114   Vancomycin, Random ug/mL 33.4 23.9       Pharmacy will continue to follow and monitor vancomycin. Call for questions regarding this assessment.    Thank you for the consult,   Tasia Robert, PharmD, [facility]  Neurocritical Care Pharmacist  [phone]               Patient brief summary:  Balta Lamb is a 49 y.o. male initiated on antimicrobial therapy with IV Vancomycin for treatment of sepsis, empiric. Sputum culture from 12/11 growing MRSA    Drug Allergies:   Review of patient's allergies indicates:  No Known Allergies    Actual Body Weight:   77.6 kg    Renal Function:   Estimated Creatinine Clearance: 12.7 mL/min (A) (based on SCr of 7.1 mg/dL (H)).,     Dialysis Method (if applicable):  intermittent HD    Microbiologic Results:  Microbiology Results (last 7 days)     Procedure Component Value Units Date/Time    Culture, Respiratory with Gram Stain [[id number]]  (Abnormal)  (Susceptibility) Collected:  12/11/19 2023    Order Status:  Completed Specimen:  Respiratory from Sputum, Induced Updated:  12/15/19 1140     Respiratory Culture No Pseudomonas isolated.      METHICILLIN RESISTANT STAPHYLOCOCCUS AUREUS  Few  Normal respiratory eliane also present       Gram Stain (Respiratory) <10 epithelial cells per low power field.     Gram Stain (Respiratory) Rare WBC's     Gram Stain (Respiratory) Few Gram positive cocci     Gram Stain (Respiratory) Rare Gram negative rods    Narrative:       Mini-BAL.     Blood culture [[id number]] Collected:  12/11/19 2017    Order Status:  Completed Specimen:  Blood from Peripheral, Antecubital, Left Updated:  12/14/19 2212     Blood Culture, Routine No Growth to date      No Growth to date      No Growth to date      No Growth to date    Narrative:       Blood cultures x 2 different sites. 4 bottles total. Please  draw cultures before administering antibiotics.    Blood culture [[id number]] Collected:  12/11/19 2017    Order Status:  Completed Specimen:  Blood from Peripheral, Upper Arm, Left Updated:  12/14/19 2212     Blood Culture, Routine No Growth to date      No Growth to date      No Growth to date      No Growth to date    Narrative:       Blood cultures from 2 different sites. 4 bottles total.  Please draw before starting antibiotics.    Blood culture [[id number]] Collected:  12/14/19 1220    Order Status:  Completed Specimen:  Blood from Peripheral, Foot, Left Updated:  12/14/19 2115     Blood Culture, Routine No Growth to date    Blood culture [[id number]] Collected:  12/14/19 1225    Order Status:  Completed Specimen:  Blood from Peripheral, Ankle, Right Updated:  12/14/19 2115     Blood Culture, Routine No Growth to date    Culture, Anaerobe [[id number]]     Order Status:  No result Specimen:  Sputum     Aerobic culture [[id number]]     Order Status:  No result Specimen:  Sputum

## 2019-12-15 NOTE — NURSING
0030: walk in patient's room. Patient said that he could not see anything. After 5 minutes checking his vision, patient confirmed that he could see me now. Let team know about the episode. Patient is still alert and oriented time 4. Move all extremities. Does not seam to be confuse. Will continue to monitor patient.

## 2019-12-15 NOTE — PLAN OF CARE
POC reviewed with pt and family. Pt verbalized understanding. Questions and concerns addressed. No acute events today. Pt progressing toward goals. Will continue to monitor. See flowsheets for full assessment and VS info.      Problem: Adult Inpatient Plan of Care  Goal: Plan of Care Review  Outcome: Ongoing, Progressing  Flowsheets (Taken 12/14/2019 2319)  Plan of Care Reviewed With: patient  Goal: Optimal Comfort and Wellbeing  Outcome: Ongoing, Progressing  Intervention: Provide Person-Centered Care  Flowsheets (Taken 12/14/2019 2319)  Trust Relationship/Rapport: care explained; choices provided; emotional support provided; empathic listening provided; questions answered; questions encouraged; reassurance provided; thoughts/feelings acknowledged     Problem: Device-Related Complication Risk (Hemodialysis)  Goal: Safe, Effective Therapy Delivery  Outcome: Ongoing, Progressing  Intervention: Optimize Device Care and Function  Flowsheets (Taken 12/14/2019 2319)  Medication Review/Management: medications reviewed     Problem: Hemodynamic Instability (Hemodialysis)  Goal: Vital Signs Remain in Desired Range  Outcome: Ongoing, Progressing  Intervention: Optimize Blood Flow  Flowsheets (Taken 12/14/2019 2319)  Medication Review/Management: medications reviewed     Problem: Diabetes Comorbidity  Goal: Blood Glucose Level Within Desired Range  Outcome: Ongoing, Progressing  Intervention: Maintain Glycemic Control  Flowsheets (Taken 12/14/2019 2319)  Glycemic Management: blood glucose monitoring; supplemental insulin given     Problem: Fall Injury Risk  Goal: Absence of Fall and Fall-Related Injury  Outcome: Ongoing, Progressing  Intervention: Identify and Manage Contributors to Fall Injury Risk  Flowsheets (Taken 12/14/2019 2319)  Self-Care Promotion: independence encouraged  Medication Review/Management: medications reviewed     Problem: Restraint, Nonbehavioral (Nonviolent)  Goal: Discontinuation Criteria  Achieved  Outcome: Ongoing, Progressing  Goal: Personal Dignity and Safety Maintained  Outcome: Ongoing, Progressing  Intervention: Education  Flowsheets (Taken 12/14/2019 2319)  Discontinuation Criteria: Absence of behavior that required restraint  Patient / Family Notification: Patient  Patient / Family Teaching: Need for restraint; Restraint monitoring     Problem: Nutrition Impairment (Mechanical Ventilation, Invasive)  Goal: Optimal Nutrition Delivery  Outcome: Ongoing, Progressing  Intervention: Optimize Nutrition Delivery  Flowsheets (Taken 12/14/2019 2319)  Nutrition Support Management: weight trending reviewed; tube feeding held     Problem: Skin and Tissue Injury (Mechanical Ventilation, Invasive)  Goal: Absence of Device-Related Skin and Tissue Injury  Outcome: Ongoing, Progressing  Intervention: Maintain Skin and Tissue Health  Flowsheets (Taken 12/14/2019 2319)  Device Skin Pressure Protection: absorbent pad utilized/changed     Problem: Skin and Tissue Injury (Artificial Airway)  Goal: Absence of Device-Related Skin or Tissue Injury  Outcome: Ongoing, Progressing  Intervention: Maintain Skin and Tissue Health  Flowsheets (Taken 12/14/2019 2319)  Device Skin Pressure Protection: absorbent pad utilized/changed; skin-to-skin areas padded     Problem: Infection  Goal: Infection Symptom Resolution  Outcome: Ongoing, Progressing  Intervention: Prevent or Manage Infection  Flowsheets (Taken 12/14/2019 2319)  Infection Management: aseptic technique maintained

## 2019-12-15 NOTE — H&P
"[facility]  Neurocritical Care  History & Physical    Admit Date: 12/11/2019  Service Date: 12/15/2019  Length of Stay: 4    Subjective:     Chief Complaint: Altered mental status    History of Present Illness: Balta Lamb is a 49 year old male with a medical history significant for HTN, DM2, ESRD (unknown schedule or last HD date) on HD who presents as a transfer from OSH for neurologic evaluation of altered mental status and "L sided numbness". History is obtained from chart as pt is alone and sedated. Per chart, pt was driving with his wife when he noted acute onset left sided numbness. Wife was able to pull car over and call 911. EMS found pt confused and combative. Pt was transported to OSH where CT Head showed no acute change and encephalomalacia in L frontal and L parietal region from prior craniotomy (unknown reason or diagnosis). Routine labs showed a K of 6.2 and Cr of 10.2. OSH ED attempted to shift the pts K with insulin. Pt was started on a Versed infusion and intubated at OSH prior to transfer to [facility] for further evaluation. On arrival, pts glucose was noted to be <20 and K was 3.4. CTA Multiphase showed no acute hemorrhage or major vascular distribution infarct and no evidence high-grade stenosis or major vessel occlusion. Pt was started on D10 drip for hypoglycemia and will be admitted to [facility] for further evalaution and higher level of care.    Interval History:  NAEON. Extubated yesterday without issue. Precedex stopped. De-escalated from Zosyn to Ceftriaxone.  Objective:     Vitals:  Temp: 97.7 °F (36.5 °C)  Pulse: 76  Rhythm: normal sinus rhythm  BP: (!) 175/68  MAP (mmHg): 96  Resp: (!) 22  SpO2: 100 %  Oxygen Concentration (%): 28  O2 Device (Oxygen Therapy): nasal cannula    Temp  Min: 97.7 °F (36.5 °C)  Max: 99 °F (37.2 °C)  Pulse  Min: 57  Max: 86  BP  Min: 115/47  Max: 195/70  MAP (mmHg)  Min: 68  Max: 107  Resp  Min: 10  Max: 22  SpO2  Min: 100 %  Max: 100 %  Oxygen " Concentration (%)  Min: 28  Max: 40    12/14 0701 - 12/15 0700  In: 1355.8 [I.V.:520.8]  Out: -            Physical Exam   Constitutional: He is oriented to person, place, and time. He appears well-developed and well-nourished.   HENT:   Head: Normocephalic and atraumatic.   Eyes: Pupils are equal, round, and reactive to light. EOM are normal.   Neck: Normal range of motion. Neck supple.   Cardiovascular: Normal rate and regular rhythm.   Pulmonary/Chest: Effort normal and breath sounds normal.   Abdominal: Soft. Bowel sounds are normal. He exhibits no distension. There is no tenderness.   Neurological: He is alert and oriented to person, place, and time.   Skin: Skin is warm and dry.       Medications:  Continuous Scheduled  sodium chloride 0.9%  Once   atorvastatin 40 mg Daily   cefTRIAXone (ROCEPHIN) IVPB 1 g Q12H   fentaNYL 100 mcg Once   heparin (porcine) 5,000 Units Q8H   polyethylene glycol 17 g Daily   QUEtiapine 50 mg BID   senna-docusate 8.6-50 mg 1 tablet BID   PRN  acetaminophen 650 mg Q6H PRN   albuterol sulfate 20 mg Q4H PRN   calcium gluconate IVPB 1 g PRN   calcium gluconate IVPB 1 g PRN   calcium gluconate IVPB 1 g PRN   Dextrose 10% Bolus 12.5 g PRN   Dextrose 10% Bolus 25 g PRN   dextrose 50% 25 g PRN   fentaNYL 50 mcg Q2H PRN   glucagon (human recombinant) 1 mg PRN   glucose 16 g PRN   glucose 24 g PRN   hydrALAZINE 10 mg Q6H PRN   insulin aspart U-100 1-10 Units Q4H PRN   senna-docusate 8.6-50 mg 1 tablet Daily PRN   sodium chloride 0.9% 10 mL PRN   vancomycin - pharmacy to dose  pharmacy to manage frequency       Diet  Diet NPO  Diet NPO      Assessment/Plan:     Neuro  * Altered mental status  49 year old male with HTN, DM2 and ESRD on HD who presented to OSH with acute onset L sided weakness and confusion.     - Neuro checks Q1hr   - Vital checks Q1hr   - CT Head WO OSH unremarkable for acute infarction or hemorrhage   - CTA Head/neck - no acute change, no high grade stenosis  - SBP <200  mmHg  - EKG NSR , ECHO EF 53%  - SSI  - Q4 POCT Glucose checks    - PT/OT/SLP  - MRI showed no acute intracranial abnormalities       Psychiatric  Agitation  Agitation 2/2 metabolic encephalopathy   -d/c EEG   -acute agitation corrected with glucose control  - fentanyl gtt, precedex stopped  - on scheduled quetiapine 50 BID       Cardiac/Vascular  Essential hypertension  - Hypertensive on admit,    - Goal SBP <200      Renal/  ESRD (end stage renal disease) on dialysis  - HD fistula in E  - Nephrology following   - Renally dose medications  - Strict I/Os   - HD 12/13, scheduled MWF    Endocrine  Hypoglycemia, coma  - Present on admission  - OSH attempted to shift HyperK with insulin    - On arrival, pt with glucose <5  - D10 drip gtt stopped (12/12),   - on 12/12 Glucose (248)  - POCT glucose q4 hour and SSI   - Monitor CMP    Type 2 diabetes mellitus  - Home glipizide held  - on admit glucose <5, started on D10 drip d/t hypoglycemia   - D10 gtt stopped (12/12)   - (12/13) Glucose 131  - on SSI, POCT glucose q4hrs    - stable             The patient is being Prophylaxed for:  Venous Thromboembolism with: Chemical  Stress Ulcer with: None  Ventilator Pneumonia with: not applicable    Activity Orders          Activity as tolerated starting at 12/15 0930    Diet NPO: NPO starting at 12/11 1515        Full Code    Safia Kinsey MD  Neurocritical Care  [facility]

## 2019-12-15 NOTE — ASSESSMENT & PLAN NOTE
49 year old male with HTN, DM2 and ESRD on HD who presented to OSH with acute onset L sided weakness and confusion.     - Neuro checks Q1hr   - Vital checks Q1hr   - CT Head WO OSH unremarkable for acute infarction or hemorrhage   - CTA Head/neck - no acute change, no high grade stenosis  - SBP <200 mmHg  - EKG NSR , ECHO EF 53%  - SSI  - Q4 POCT Glucose checks    - PT/OT/SLP  - MRI showed no acute intracranial abnormalities

## 2019-12-15 NOTE — ASSESSMENT & PLAN NOTE
Agitation 2/2 metabolic encephalopathy   -d/c EEG   -acute agitation corrected with glucose control  - fentanyl gtt, precedex stopped  - on scheduled quetiapine 50 BID

## 2019-12-15 NOTE — PROVIDER TRANSFER
"Neuro Critical Care Transfer of Care note    Date of Admit: 12/11/2019  Date of Transfer / Stepdown: 12/15/2019    Brief History of Present Illness:      Balta Lamb is a 49 y.o. male year old male with PMH HTN, DM2, ESRD on HD who presented as a transfer from OSH for neurologic evaluation of AMS and "L sided numbness".  Per chart, pt was driving with his wife when he noted acute onset left sided numbness. Wife was able to pull car over and call 911. EMS found pt confused and combative. Pt was transported to OSH where CT Head showed no acute change and encephalomalacia in L frontal and L parietal region from prior craniotomy (unknown reason or diagnosis). Routine labs showed a K of 6.2 and Cr of 10.2. OSH ED attempted to shift the pts K with insulin. Pt was started on a Versed infusion and intubated at OSH prior to transfer to C for further evaluation. On arrival, pts glucose was noted to be <5 and K was 3.4. CTA Multiphase showed no acute hemorrhage or major vascular distribution infarct and no evidence high-grade stenosis or major vessel occlusion. Pt was started on D10 drip for hypoglycemia and will be admitted to [facility] for further evalaution and higher level of care.    Hospital Course By Problem with Pertinent Findings:     Altered mental status  49 year old male with HTN, DM2 and ESRD on HD who presented to OSH with acute onset L sided weakness and confusion.      - Neuro checks Q1hr   - CT Head WO OSH unremarkable for acute infarction or hemorrhage   - CTA Head/neck - no acute change, no high grade stenosis  - SBP <200 mmHg  - EKG NSR , ECHO EF 53%  - SSI  - Q4 POCT Glucose checks  - MRI showed no acute intracranial abnormalities         Psychiatric  Agitation  Agitation 2/2 metabolic encephalopathy   -d/c EEG   -acute agitation with correction of glucose   - stopped fentanyl gtt, precedex    - on scheduled quetiapine 50 BID        Pulm  MRSA in sputum  Started on zosyn, de-escalated to ceftriaxone " 12/15     Cardiac/Vascular  Essential hypertension  - Hypertensive on admit,    - Goal SBP <200        Renal/  ESRD (end stage renal disease) on dialysis  - HD fistula in [facility]  - Nephrology following   - Renally dose medications  - Strict I/Os   - HD 12/13, scheduled MWF      Endocrine  Hypoglycemia, coma  - Present on admission  - OSH attempted to shift HyperK with insulin    - On arrival, pt with glucose <5  - D10 drip gtt stopped (12/12),   - on 12/12 Glucose (248)  - POCT glucose q4 hour and SSI   - Monitor CMP     Type 2 diabetes mellitus  - Home glipizide held  - on admit glucose <5, started on D10 drip d/t hypoglycemia   - D10 gtt stopped (12/12)   - (12/13) Glucose 131  - on SSI, POCT glucose q4hrs           Consultants and Procedures:   MRI, CT,EEG    Transfer Information:     Diet:  NPO, advance as tolerated    Physical Activity:  PT/OT        Safia Kinsey  Neuro Crtical Care

## 2019-12-15 NOTE — PT/OT/SLP EVAL
"Speech Language Pathology Evaluation  Bedside Swallow    Patient Name:  Balta Lamb   MRN:  [id number]  Admitting Diagnosis: Altered mental status    Recommendations:                General Recommendations:  ongoing swallow assessment  Diet recommendations:  NPO, NPO   Aspiration Precautions: Ice chips sparingly, Meds crushed in puree and Strict aspiration precautions   General Precautions: Standard, aspiration, NPO, fall, vision impaired    History:     History reviewed. No pertinent past medical history.    History reviewed. No pertinent surgical history.    Prior Intubation HX:  Extubated yesterday (12/14) around 11am    Chest X-Rays: 12/15 There has been interval removal of endotracheal tube and nasogastric tube.  Bilateral perihilar opacities with interstitial prominence remains.  There is no pneumothorax.  The remainder of the examination is unchanged.    Prior diet: regular     Subjective   Awake yet lethargic. Sisters at bedside. Pt's primary language is [place]ian yet able to understand basic English & sisters able to adequately translate.     Pt blind in left eye & "everything is big in right eye"    Pain/Comfort:  · Pain Rating 1: 8/10  · Location 1: back  · Pain Addressed 2: Reposition, Distraction  · Pain Rating Post-Intervention 2: 8/10    Objective:     Oral Musculature Evaluation  · Oral Musculature: WFL  · Dentition: (no top teeth, tolerates regular diet despite this)  · Oral Labial Strength and Mobility: WFL  · Lingual Strength and Mobility: WFL  · Volitional Cough: adequate   · Volitional Swallow: adequate  · Voice Prior to PO Intake: clear    Bedside Swallow Eval:   Consistencies Assessed:  · Thin liquids ice chips x2, via spoon x1, via cup sips x3  · Nectar thick liquids via small cup sips x2  · Puree 1 tsp x2     Oral Phase:   · WFL    Pharyngeal Phase:   · Delayed coughing/choking following completion of trials  · multiple spontaneous swallows with all     SLP educated pt & family on recs, " swallow function post intubation, recs, precautions, risks & s/s aspiration, role of SLP, POC, etc. Pt & sisters verbalized understanding.     Assessment:     Balta Lamb is a 49 y.o. male with an SLP diagnosis of Dysphagia.  He presents with risk of aspiration s/p extubation.    Goals:   Multidisciplinary Problems     SLP Goals        Problem: SLP Goal    Goal Priority Disciplines Outcome   SLP Goal     SLP Ongoing, Progressing   Description:  Speech Language Pathology Goals  Goals expected to be met by 12/22  1. Ongoing swallow assessment                      Plan:     · Patient to be seen:  4 x/week   · Plan of Care expires:  01/13/20  · Plan of Care reviewed with:  patient, sibling   · SLP Follow-Up:  Yes       Discharge recommendations:  rehabilitation facility     Time Tracking:     SLP Treatment Date:   12/15/19  Speech Start Time:  1329  Speech Stop Time:  1345     Speech Total Time (min):  16 min    Billable Minutes: Eval Swallow and Oral Function 8 and Seld Care/Home Management Training 8    Nikky Duff CCC-SLP  12/15/2019

## 2019-12-15 NOTE — PROGRESS NOTES
Pt blood glucose 69. Pt given orange juice by request. Pt successfully sipped orange juice under RN supervision. Will reassess and continue to monitor.

## 2019-12-15 NOTE — ASSESSMENT & PLAN NOTE
- HD fistula in LUE  - Nephrology following   - Renally dose medications  - Strict I/Os   - HD 12/13, scheduled MWF

## 2019-12-15 NOTE — SUBJECTIVE & OBJECTIVE
Interval History:  Extubated yesterday without issue. Zosyn de-escalated to ceftriaxone. Encephalopathy resolved.     Objective:     Vitals:  Temp: 97.7 °F (36.5 °C)  Pulse: 76  Rhythm: normal sinus rhythm  BP: (!) 175/68  MAP (mmHg): 96  Resp: (!) 22  SpO2: 100 %  Oxygen Concentration (%): 28  O2 Device (Oxygen Therapy): nasal cannula    Temp  Min: 97.7 °F (36.5 °C)  Max: 99 °F (37.2 °C)  Pulse  Min: 57  Max: 86  BP  Min: 115/47  Max: 195/70  MAP (mmHg)  Min: 68  Max: 107  Resp  Min: 10  Max: 22  SpO2  Min: 100 %  Max: 100 %  Oxygen Concentration (%)  Min: 28  Max: 40    12/14 0701 - 12/15 0700  In: 1355.8 [I.V.:520.8]  Out: -            Physical Exam   Constitutional: He is oriented to person, place, and time. He appears well-developed and well-nourished.   HENT:   Head: Normocephalic and atraumatic.   Eyes: Pupils are equal, round, and reactive to light. EOM are normal.   Neck: Normal range of motion. Neck supple.   Cardiovascular: Normal rate and regular rhythm.   Pulmonary/Chest: Effort normal and breath sounds normal.   Abdominal: Soft. Bowel sounds are normal. He exhibits no distension. There is no tenderness.   Neurological: He is alert and oriented to person, place, and time.   Skin: Skin is warm and dry.         Medications:  Continuous Scheduled  sodium chloride 0.9%  Once   atorvastatin 40 mg Daily   cefTRIAXone (ROCEPHIN) IVPB 1 g Q12H   fentaNYL 100 mcg Once   heparin (porcine) 5,000 Units Q8H   polyethylene glycol 17 g Daily   QUEtiapine 50 mg BID   senna-docusate 8.6-50 mg 1 tablet BID   PRN  acetaminophen 650 mg Q6H PRN   albuterol sulfate 20 mg Q4H PRN   calcium gluconate IVPB 1 g PRN   calcium gluconate IVPB 1 g PRN   calcium gluconate IVPB 1 g PRN   Dextrose 10% Bolus 12.5 g PRN   Dextrose 10% Bolus 25 g PRN   dextrose 50% 25 g PRN   fentaNYL 50 mcg Q2H PRN   glucagon (human recombinant) 1 mg PRN   glucose 16 g PRN   glucose 24 g PRN   hydrALAZINE 10 mg Q6H PRN   insulin aspart U-100 1-10 Units  Q4H PRN   senna-docusate 8.6-50 mg 1 tablet Daily PRN   sodium chloride 0.9% 10 mL PRN   vancomycin - pharmacy to dose  pharmacy to manage frequency       Diet  Diet NPO  Diet NPO

## 2019-12-15 NOTE — PT/OT/SLP PROGRESS
"Occupational Therapy   Treatment    Name: Balta Lamb  MRN: [id number]  Admitting Diagnosis:  Altered mental status       Recommendations:     Discharge Recommendations: rehabilitation facility  Discharge Equipment Recommendations:  bath bench  Barriers to discharge:  Inaccessible home environment, Decreased caregiver support    Assessment:     Balta Lamb is a 49 y.o. male with a medical diagnosis of Altered mental status.  He presents with performance deficits affecting function are weakness, impaired self care skills, impaired balance, decreased coordination, decreased safety awareness, decreased ROM, impaired coordination, decreased upper extremity function, impaired functional mobilty, impaired endurance, impaired sensation, gait instability, impaired cognition, decreased lower extremity function, impaired fine motor.     Rehab Prognosis:  Good; patient would benefit from acute skilled OT services to address these deficits and reach maximum level of function.       Plan:     Patient to be seen 4 x/week to address the above listed problems via self-care/home management, neuromuscular re-education, therapeutic activities, therapeutic exercises, sensory integration  · Plan of Care Expires: 01/09/20  · Plan of Care Reviewed with: patient    Subjective   Patient:  "I don't see no more, just a little bit."  Pain/Comfort:  · Pain Rating 1: 0/10  · Pain Rating Post-Intervention 1: 0/10    Objective:     Communicated with: Nurse prior to session.  Patient found supine with bed alarm, SCD, telemetry, oxygen, peripheral IV, arterial line, restraints, pulse ox (continuous), blood pressure cuff upon OT entry to room.    General Precautions: Standard, aspiration, fall, NPO   Orthopedic Precautions:N/A   Braces: N/A   Occupational Profile: Per patient:  Patient resides in [place] with wife in trailor with 3 steps to enter, no rail.  PTA patient independent with ADLs.  Currently owns no DME.  Unemployed (on disability " 2* dialysis).    Occupational Performance:     Bed Mobility:    · Patient completed Rolling/Turning to Left with  minimum assistance  · Patient completed Rolling/Turning to Right with minimum assistance  · Patient completed Scooting/Bridging with minimum assistance  · Patient completed Supine to Sit with minimum assistance  · Patient completed Sit to Supine with minimum assistance     Functional Mobility/Transfers:  · Patient completed Sit <> Stand Transfer with minimum assistance  with  no assistive device   · Patient completed stand pivot transfer with moderate assistance with no assistive device    Activities of Daily Living:  · Grooming: moderate assistance while seated EOB  · Upper Body Dressing: maximal assistance while seated EOB  · Lower Body Dressing: maximal assistance while seated EOB      [facility] 6 Click ADL: 12    Treatment & Education:  Patient education provided for on role of OT and need for rehab upon discharge.  Patient education provided on fall prevention during ADLs.  AAROM performed bilateral UE/LEs one set x 10 rep in all planes of motion with stretches provided at end range; assistance and facilitation provided for upward rotation of the scapula during shoulder flexion and abduction to promote orientation of glenoid fossa of scapula.  Patient alert and oriented x 3; able to follow 4/4 one step commands.  Patient attentive and interactive throughout the session.  Patient able to identify 3/3 body parts.  Family not present during the session.  Continued education, patient/ family training recommended.    Patient's functional status and disposition recommendation discussed with patient and nurse.  White board updated in patient's room.  OT asked if there were any other questions; patient had no further questions.     Patient left supine with all lines intact, call button in reach, bed alarm on and restraints reapplied at end of sessionEducation:      GOALS:   Multidisciplinary Problems      Occupational Therapy Goals        Problem: Occupational Therapy Goal    Goal Priority Disciplines Outcome Interventions   Occupational Therapy Goal     OT, PT/OT Ongoing, Progressing    Description:  Goals set 12/15 to be addressed for 14 days with expiration date, 12/29:  Patient will increase functional independence with ADLs by performing:    Patient will demonstrate rolling to the right with SBA.  Not met   Patient will demonstrate rolling to the left with SBA.   Not met  Patient will demonstrate supine -sit with CGA.   Not met  Patient will demonstrate stand pivot transfers with min assist.   Not met  Patient will demonstrate grooming while seated with CGA.   Not met  Patient will demonstrate upper body dressing with min assist while seated EOB.   Not met  Patient will demonstrate lower body dressing with min assist while seated EOB.   Not met  Patient will demonstrate toileting with min assist.   Not met  Patient will demonstrate bathing while seated EOB with min assist.   Not met  Patient's family / caregiver will demonstrate independence and safety with assisting patient with self-care skills and functional mobility.     Not met                         Time Tracking:     OT Date of Treatment: 12/15/19  OT Start Time: 0546  OT Stop Time: 0610  OT Total Time (min): 24 min    Billable Minutes:Self Care/Home Management 24    KYLER Ochoa  12/15/2019

## 2019-12-15 NOTE — PROGRESS NOTES
Nursing Transfer Note       Transfer To [facility]    Transfer via bed    Transfer with cardiac monitoring    Transported by RN    Medicines sent: yes    Chart sent with patient: Yes    Notified: spouse    Bedside Neuro assessment performed: Yes    Bedside Handoff given to: BONNIE Norris    Upon arrival to floor: cardiac monitor applied, patient oriented to room, call bell in reach and bed in lowest position

## 2019-12-16 LAB
ALBUMIN SERPL BCP-MCNC: 3 G/DL (ref 3.5–5.2)
ALP SERPL-CCNC: 78 U/L (ref 55–135)
ALT SERPL W/O P-5'-P-CCNC: 19 U/L (ref 10–44)
ANION GAP SERPL CALC-SCNC: 19 MMOL/L (ref 8–16)
AST SERPL-CCNC: 21 U/L (ref 10–40)
BACTERIA BLD CULT: NORMAL
BACTERIA BLD CULT: NORMAL
BACTERIA SPEC AEROBE CULT: ABNORMAL
BACTERIA SPEC AEROBE CULT: ABNORMAL
BASOPHILS # BLD AUTO: 0.02 K/UL (ref 0–0.2)
BASOPHILS NFR BLD: 0.5 % (ref 0–1.9)
BILIRUB SERPL-MCNC: 0.6 MG/DL (ref 0.1–1)
BUN SERPL-MCNC: 57 MG/DL (ref 6–20)
CALCIUM SERPL-MCNC: 7.9 MG/DL (ref 8.7–10.5)
CHLORIDE SERPL-SCNC: 106 MMOL/L (ref 95–110)
CO2 SERPL-SCNC: 19 MMOL/L (ref 23–29)
CREAT SERPL-MCNC: 9.4 MG/DL (ref 0.5–1.4)
DIFFERENTIAL METHOD: ABNORMAL
EOSINOPHIL # BLD AUTO: 0.1 K/UL (ref 0–0.5)
EOSINOPHIL NFR BLD: 2.3 % (ref 0–8)
ERYTHROCYTE [DISTWIDTH] IN BLOOD BY AUTOMATED COUNT: 13.6 % (ref 11.5–14.5)
EST. GFR  (AFRICAN AMERICAN): 6.7 ML/MIN/1.73 M^2
EST. GFR  (NON AFRICAN AMERICAN): 5.8 ML/MIN/1.73 M^2
GLUCOSE SERPL-MCNC: 78 MG/DL (ref 70–110)
GRAM STN SPEC: ABNORMAL
HCT VFR BLD AUTO: 23.8 % (ref 40–54)
HGB BLD-MCNC: 7.3 G/DL (ref 14–18)
IMM GRANULOCYTES # BLD AUTO: 0.01 K/UL (ref 0–0.04)
IMM GRANULOCYTES NFR BLD AUTO: 0.2 % (ref 0–0.5)
LYMPHOCYTES # BLD AUTO: 0.6 K/UL (ref 1–4.8)
LYMPHOCYTES NFR BLD: 13.1 % (ref 18–48)
MAGNESIUM SERPL-MCNC: 2.4 MG/DL (ref 1.6–2.6)
MCH RBC QN AUTO: 33 PG (ref 27–31)
MCHC RBC AUTO-ENTMCNC: 30.7 G/DL (ref 32–36)
MCV RBC AUTO: 108 FL (ref 82–98)
MONOCYTES # BLD AUTO: 0.5 K/UL (ref 0.3–1)
MONOCYTES NFR BLD: 10.8 % (ref 4–15)
NEUTROPHILS # BLD AUTO: 3.2 K/UL (ref 1.8–7.7)
NEUTROPHILS NFR BLD: 73.1 % (ref 38–73)
NRBC BLD-RTO: 0 /100 WBC
PHOSPHATE SERPL-MCNC: 7.3 MG/DL (ref 2.7–4.5)
PLATELET # BLD AUTO: 133 K/UL (ref 150–350)
PMV BLD AUTO: 11.2 FL (ref 9.2–12.9)
POCT GLUCOSE: 136 MG/DL (ref 70–110)
POCT GLUCOSE: 91 MG/DL (ref 70–110)
POTASSIUM SERPL-SCNC: 4.5 MMOL/L (ref 3.5–5.1)
PROT SERPL-MCNC: 6 G/DL (ref 6–8.4)
RBC # BLD AUTO: 2.21 M/UL (ref 4.6–6.2)
SODIUM SERPL-SCNC: 144 MMOL/L (ref 136–145)
VANCOMYCIN SERPL-MCNC: 22 UG/ML
VIT B1 BLD-MCNC: 42 UG/L (ref 38–122)
WBC # BLD AUTO: 4.36 K/UL (ref 3.9–12.7)

## 2019-12-16 PROCEDURE — 25000003 PHARM REV CODE 250: Performed by: HOSPITALIST

## 2019-12-16 PROCEDURE — 63600175 PHARM REV CODE 636 W HCPCS: Performed by: GENERAL PRACTICE

## 2019-12-16 PROCEDURE — 83735 ASSAY OF MAGNESIUM: CPT

## 2019-12-16 PROCEDURE — 63600175 PHARM REV CODE 636 W HCPCS: Performed by: PSYCHIATRY & NEUROLOGY

## 2019-12-16 PROCEDURE — 63600175 PHARM REV CODE 636 W HCPCS: Performed by: HOSPITALIST

## 2019-12-16 PROCEDURE — 25000003 PHARM REV CODE 250: Performed by: PSYCHIATRY & NEUROLOGY

## 2019-12-16 PROCEDURE — 84100 ASSAY OF PHOSPHORUS: CPT

## 2019-12-16 PROCEDURE — 99233 PR SUBSEQUENT HOSPITAL CARE,LEVL III: ICD-10-PCS | Mod: ,,, | Performed by: HOSPITALIST

## 2019-12-16 PROCEDURE — 80100016 HC MAINTENANCE HEMODIALYSIS

## 2019-12-16 PROCEDURE — 85025 COMPLETE CBC W/AUTO DIFF WBC: CPT

## 2019-12-16 PROCEDURE — 80202 ASSAY OF VANCOMYCIN: CPT

## 2019-12-16 PROCEDURE — 90935 PR HEMODIALYSIS, ONE EVALUATION: ICD-10-PCS | Mod: ,,, | Performed by: NURSE PRACTITIONER

## 2019-12-16 PROCEDURE — 92526 ORAL FUNCTION THERAPY: CPT

## 2019-12-16 PROCEDURE — 97535 SELF CARE MNGMENT TRAINING: CPT

## 2019-12-16 PROCEDURE — 90935 HEMODIALYSIS ONE EVALUATION: CPT

## 2019-12-16 PROCEDURE — 25000003 PHARM REV CODE 250: Performed by: NURSE PRACTITIONER

## 2019-12-16 PROCEDURE — 99233 SBSQ HOSP IP/OBS HIGH 50: CPT | Mod: ,,, | Performed by: HOSPITALIST

## 2019-12-16 PROCEDURE — 11000001 HC ACUTE MED/SURG PRIVATE ROOM

## 2019-12-16 PROCEDURE — 80053 COMPREHEN METABOLIC PANEL: CPT

## 2019-12-16 PROCEDURE — 90935 HEMODIALYSIS ONE EVALUATION: CPT | Mod: ,,, | Performed by: NURSE PRACTITIONER

## 2019-12-16 RX ORDER — AMLODIPINE BESYLATE 5 MG/1
5 TABLET ORAL DAILY
Status: DISCONTINUED | OUTPATIENT
Start: 2019-12-16 | End: 2019-12-16

## 2019-12-16 RX ORDER — HYDRALAZINE HYDROCHLORIDE 50 MG/1
50 TABLET, FILM COATED ORAL EVERY 8 HOURS
Status: DISCONTINUED | OUTPATIENT
Start: 2019-12-16 | End: 2019-12-22

## 2019-12-16 RX ORDER — NIFEDIPINE 30 MG/1
30 TABLET, EXTENDED RELEASE ORAL DAILY
Status: DISCONTINUED | OUTPATIENT
Start: 2019-12-17 | End: 2019-12-17

## 2019-12-16 RX ORDER — HYDRALAZINE HYDROCHLORIDE 25 MG/1
25 TABLET, FILM COATED ORAL EVERY 8 HOURS PRN
Status: DISCONTINUED | OUTPATIENT
Start: 2019-12-16 | End: 2019-12-23 | Stop reason: HOSPADM

## 2019-12-16 RX ORDER — SEVELAMER CARBONATE 800 MG/1
800 TABLET, FILM COATED ORAL
Status: DISCONTINUED | OUTPATIENT
Start: 2019-12-16 | End: 2019-12-16

## 2019-12-16 RX ORDER — CLONIDINE HYDROCHLORIDE 0.1 MG/1
0.1 TABLET ORAL 2 TIMES DAILY
Status: DISCONTINUED | OUTPATIENT
Start: 2019-12-16 | End: 2019-12-23 | Stop reason: HOSPADM

## 2019-12-16 RX ORDER — SODIUM CHLORIDE 9 MG/ML
INJECTION, SOLUTION INTRAVENOUS ONCE
Status: COMPLETED | OUTPATIENT
Start: 2019-12-16 | End: 2019-12-16

## 2019-12-16 RX ORDER — HYDRALAZINE HYDROCHLORIDE 25 MG/1
25 TABLET, FILM COATED ORAL EVERY 8 HOURS
Status: DISCONTINUED | OUTPATIENT
Start: 2019-12-16 | End: 2019-12-16

## 2019-12-16 RX ORDER — AMLODIPINE BESYLATE 10 MG/1
10 TABLET ORAL DAILY
Status: DISCONTINUED | OUTPATIENT
Start: 2019-12-17 | End: 2019-12-16

## 2019-12-16 RX ORDER — TALC
6 POWDER (GRAM) TOPICAL NIGHTLY PRN
Status: DISCONTINUED | OUTPATIENT
Start: 2019-12-16 | End: 2019-12-23 | Stop reason: HOSPADM

## 2019-12-16 RX ORDER — SEVELAMER CARBONATE FOR ORAL SUSPENSION 800 MG/1
0.8 POWDER, FOR SUSPENSION ORAL
Status: DISCONTINUED | OUTPATIENT
Start: 2019-12-16 | End: 2019-12-23 | Stop reason: HOSPADM

## 2019-12-16 RX ADMIN — VANCOMYCIN HYDROCHLORIDE 750 MG: 750 INJECTION, POWDER, LYOPHILIZED, FOR SOLUTION INTRAVENOUS at 03:12

## 2019-12-16 RX ADMIN — HYDRALAZINE HYDROCHLORIDE 25 MG: 25 TABLET, FILM COATED ORAL at 12:12

## 2019-12-16 RX ADMIN — HEPARIN SODIUM 5000 UNITS: 5000 INJECTION, SOLUTION INTRAVENOUS; SUBCUTANEOUS at 06:12

## 2019-12-16 RX ADMIN — HYDRALAZINE HYDROCHLORIDE 50 MG: 50 TABLET, FILM COATED ORAL at 09:12

## 2019-12-16 RX ADMIN — HYDRALAZINE HYDROCHLORIDE 25 MG: 25 TABLET, FILM COATED ORAL at 04:12

## 2019-12-16 RX ADMIN — SENNOSIDES AND DOCUSATE SODIUM 1 TABLET: 8.6; 5 TABLET ORAL at 09:12

## 2019-12-16 RX ADMIN — QUETIAPINE FUMARATE 50 MG: 25 TABLET ORAL at 03:12

## 2019-12-16 RX ADMIN — SODIUM CHLORIDE: 0.9 INJECTION, SOLUTION INTRAVENOUS at 01:12

## 2019-12-16 RX ADMIN — ACETAMINOPHEN 650 MG: 325 TABLET ORAL at 09:12

## 2019-12-16 RX ADMIN — HYDRALAZINE HYDROCHLORIDE 10 MG: 20 INJECTION INTRAMUSCULAR; INTRAVENOUS at 02:12

## 2019-12-16 RX ADMIN — HEPARIN SODIUM 5000 UNITS: 5000 INJECTION, SOLUTION INTRAVENOUS; SUBCUTANEOUS at 09:12

## 2019-12-16 RX ADMIN — QUETIAPINE FUMARATE 50 MG: 25 TABLET ORAL at 09:12

## 2019-12-16 RX ADMIN — CLONIDINE HYDROCHLORIDE 0.1 MG: 0.1 TABLET ORAL at 09:12

## 2019-12-16 NOTE — PROGRESS NOTES
"[facility] H[facility]ODIAL[facility] NOTE    Balta Lamb is a 49 y.o. male currently on hemodialysis for removal of uremic toxins and volume.     Patient seen and evaluated on hemodialysis, tolerating treatment, see HD flowsheet for vitals and assessments.    No Hypotension, chest pain, shortness of breath, cramping, nausea or vomiting.      Labs have been reviewed and the dialysate bath has been adjusted.    Labs:      Recent Labs   Lab 12/14/19  0114 12/15/19  0123 12/16/19  0525     140  140  140 142 144   K 4.0  4.0  4.0  4.0 4.6 4.5     108  108  108 108 106   CO2 24  24  24  24 22* 19*   BUN 26*  26*  26*  26* 39* 57*   CREATININE 5.3*  5.3*  5.3*  5.3* 7.1* 9.4*   CALCIUM 7.9*  7.9*  7.9*  7.9* 7.7* 7.9*   PHOS 4.2  4.2  4.2  4.2 5.9* 7.3*       Recent Labs   Lab 12/14/19  0114 12/15/19  0123 12/16/19  0525   WBC 4.65  4.65 4.00 4.36   HGB 8.0*  8.0* 8.7* 7.3*   HCT 25.5*  25.5* 27.3* 23.8*   *  107* 108* 133*        Assessment/Plan    Ultrafiltration goal:   - Seen on dialysis this afternoon, tolerating session with current UFR, no complications.    - Patient confused and agitated on exam, requesting to be taken off treatment and asking for his "sister," able to redirect at times. Scheduled quetiapine to be given.   - Patient hypertensive likely due to agitation during treatment, PRN hydralazine given.   - No lab stick/BP intake on access site  - Will continue dialysis treatments while in-patient  - Continue to monitor intake and output, daily weights   - Avoid nephrotoxic medication and renal dose medications to GFR    Anemia of ESRD  - Hgb 7.3  - Will collect iron studies in the AM.     BMM  - Renal diet with protein intake goal 1.5 g/kg/d  - Novasource with meals  - Phos 7.3  - Will restart binders   - Daily renal function panel     Riddhi Saenz, DNP, APRN, FNP-C  Nephrology Department  Pager:  [phone]      "

## 2019-12-16 NOTE — PLAN OF CARE
CM attempted to contact spouse on skilled nursing home choices. Voicemail left. CM attempted to visit pts room. Pt not in room. No visitors seen. Still awaiting PT eval.     12/16/19 1223   Discharge Reassessment   Assessment Type Discharge Planning Reassessment   Discharge Plan A Rehab   Discharge Plan B Skilled Nursing Facility   Anticipated Discharge Disposition Rehab

## 2019-12-16 NOTE — PROGRESS NOTES
Pharmacokinetic Assessment Follow Up: IV Vancomycin    Vancomycin serum concentration Assessment(s) and Plan:    Patient is currently on Vancomycin - day 5 (EOT: 1 week)  Pulse dosing vanc in ESRD patient.  HD this afternoon for 12/16 today - pre HD level was 22 mcg/mL  HD usually removes ~ 20-30% of Cp Vanc.  Vancomycin 750 mg dose x1 today post HD.  Can check random level with AM labs on 12/18/19.      Drug levels (last 3 results):  Recent Labs   Lab Result Units 12/14/19  0114 12/16/19  0525   Vancomycin, Random ug/mL 23.9 22.0       Pharmacy will continue to follow and monitor vancomycin.    Please contact pharmacy at extension [phone] for questions regarding this assessment.    Thank you for the consult,   Barbara Morris       Patient brief summary:  Balta Lamb is a 49 y.o. male initiated on antimicrobial therapy with IV Vancomycin for treatment of sepsis, empiric.  Sputum culture from 12/11 growing MRSA.      Drug Allergies:   Review of patient's allergies indicates:  No Known Allergies    Actual Body Weight:   77.6 kg    Renal Function:   Estimated Creatinine Clearance: 9.6 mL/min (A) (based on SCr of 9.4 mg/dL (H)).,     Dialysis Method (if applicable):  intermittent HD    CBC (last 72 hours):  Recent Labs   Lab Result Units 12/14/19  0114 12/15/19  0123 12/16/19  0525   WBC K/uL 4.65  4.65 4.00 4.36   Hemoglobin g/dL 8.0*  8.0* 8.7* 7.3*   Hematocrit % 25.5*  25.5* 27.3* 23.8*   Platelets K/uL 107*  107* 108* 133*   Gran% % 75.7*  75.7* 72.4 73.1*   Lymph% % 10.8*  10.8* 12.0* 13.1*   Mono% % 10.1  10.1 12.0 10.8   Eosinophil% % 3.0  3.0 3.0 2.3   Basophil% % 0.4  0.4 0.3 0.5   Differential Method  Automated  Automated Automated Automated       Metabolic Panel (last 72 hours):  Recent Labs   Lab Result Units 12/13/19  1419 12/13/19  2140 12/14/19  0114 12/15/19  0123 12/16/19  0525   Sodium mmol/L 142 142 140  140  140  140 142 144   Potassium mmol/L 3.8 4.0 4.0  4.0  4.0  4.0 4.6 4.5    Chloride mmol/L 109 110 108  108  108  108 108 106   CO2 mmol/L 25 24 24  24  24  24 22* 19*   Glucose mg/dL 119* 197* 173*  173*  173*  173* 109 78   BUN, Bld mg/dL 15 23* 26*  26*  26*  26* 39* 57*   Creatinine mg/dL 3.9* 5.0* 5.3*  5.3*  5.3*  5.3* 7.1* 9.4*   Albumin g/dL 2.9* 2.7* 2.6*  2.6*  2.6*  2.6* 2.6* 3.0*   Total Bilirubin mg/dL  --   --  0.7  0.7 0.6 0.6   Alkaline Phosphatase U/L  --   --  75  75 72 78   AST U/L  --   --  13  13 13 21   ALT U/L  --   --  12  12 12 19   Magnesium mg/dL  --   --  2.1  2.1 2.2 2.4   Phosphorus mg/dL 3.5 4.2 4.2  4.2  4.2  4.2 5.9* 7.3*       Vancomycin Administrations:  vancomycin given in the last 96 hours      No antibiotic orders with administrations found.                Microbiologic Results:  Microbiology Results (last 7 days)     Procedure Component Value Units Date/Time    Blood culture [[id number]] Collected:  12/11/19 2017    Order Status:  Completed Specimen:  Blood from Peripheral, Upper Arm, Left Updated:  12/15/19 2212     Blood Culture, Routine No Growth to date      No Growth to date      No Growth to date      No Growth to date      No Growth to date    Narrative:       Blood cultures from 2 different sites. 4 bottles total.  Please draw before starting antibiotics.    Blood culture [[id number]] Collected:  12/11/19 2017    Order Status:  Completed Specimen:  Blood from Peripheral, Antecubital, Left Updated:  12/15/19 2212     Blood Culture, Routine No Growth to date      No Growth to date      No Growth to date      No Growth to date      No Growth to date    Narrative:       Blood cultures x 2 different sites. 4 bottles total. Please  draw cultures before administering antibiotics.    Blood culture [[id number]] Collected:  12/14/19 1220    Order Status:  Completed Specimen:  Blood from Peripheral, Foot, Left Updated:  12/15/19 1412     Blood Culture, Routine No Growth to date      No Growth to date    Blood culture [[id number]]  Collected:  12/14/19 1225    Order Status:  Completed Specimen:  Blood from Peripheral, Ankle, Right Updated:  12/15/19 1412     Blood Culture, Routine No Growth to date      No Growth to date    Culture, Respiratory with Gram Stain [[id number]]  (Abnormal)  (Susceptibility) Collected:  12/11/19 2023    Order Status:  Completed Specimen:  Respiratory from Sputum, Induced Updated:  12/15/19 1140     Respiratory Culture No Pseudomonas isolated.      METHICILLIN RESISTANT STAPHYLOCOCCUS AUREUS  Few  Normal respiratory eliane also present       Gram Stain (Respiratory) <10 epithelial cells per low power field.     Gram Stain (Respiratory) Rare WBC's     Gram Stain (Respiratory) Few Gram positive cocci     Gram Stain (Respiratory) Rare Gram negative rods    Narrative:       Mini-BAL.    Culture, Anaerobe [[id number]]     Order Status:  No result Specimen:  Sputum     Aerobic culture [[id number]]     Order Status:  No result Specimen:  Sputum

## 2019-12-16 NOTE — CODE/ RAPID DOCUMENTATION
Rapid Response Nurse Chart Check     Chart check completed, abnormal VS noted. Dialysis RN contacted, no concerns verbalized at this time, instructed to call [phone] for further concerns or assistance.

## 2019-12-16 NOTE — PROGRESS NOTES
Last 2 Bps:200/94 and 211/93    Dr Talley paged. Ordered Prn Antihypertensive.    Will administer and cont to monitor

## 2019-12-16 NOTE — NURSING
Pt keeps asking me to kill him. States he wants me to give him an injection to kill him. States he will sign all paperwork to die.

## 2019-12-16 NOTE — PT/OT/SLP PROGRESS
Physical Therapy      Patient Name:  Balta Lamb   MRN:  [id number]    PT evaluation orders received. Upon attempt for evaluation, pt off floor for dialysis, PT unable to return for additional attempt. Will follow-up at next possible date pending pt medical status.    Noemy Page, PT

## 2019-12-16 NOTE — NURSING
Pt refusing to wear telemetry. Pt has also refused vital signs and blood glucose checks. Pt keeps repeating that he wants to die and for me to help him die. Pt displays depression and has a flat affect. MD present upon assessment. Telemetry discontinued.

## 2019-12-16 NOTE — PLAN OF CARE
"Patient AO x 4 but repeatedly asking for someone to "help him die". Has no complaints of pain or discomfort. Isolation precautions in place. Incontinent of stool, bed linens had to be changed 3 times during the night. Patient also pulled out both his midline in the right arm, and the IV in his right foot. Several attempts to start a new IV were unsuccessful. Med Team N contacted. Instructions were to call the primary team after 0700 to decide what to do next. Patient currently resting with all safety precautions in place. Will continue to monitor.  "

## 2019-12-16 NOTE — PLAN OF CARE
Problem: SLP Goal  Goal: SLP Goal  Description  Speech Language Pathology Goals  Goals expected to be met by 12/23  1. Pt will tolerate puree & thin liquids without s/s aspiration  2. Ongoing swallow assessment to determine safest & least restrictive PO consistencies    Speech Language Pathology Goals  Goals expected to be met by 12/22   1. Ongoing swallow assessment MET      Outcome: Ongoing, Progressing    Pt was seen for ST session.

## 2019-12-16 NOTE — NURSING
Spoke with Dr. Talley about patient. MD davis with no IV access. MD called dialysis and instructed dialysis to give all IV meds during dialysis. Tele discontinued. NPO until Speech evaluates patient again.

## 2019-12-16 NOTE — PROGRESS NOTES
"            12/16/2019    Progress Note  Hospital Medicine                    12/16/2019      Admit Date: 12/11/2019             LOS: 5 days     SUBJECTIVE:     Follow-up For: Altered mental status    HPI/Interval history (See H&P for complete P,F,SHx) :   This is a 49 y.o. male.     Balta Lamb is a 49 y.o. male year old male with PMH HTN, DM2, ESRD on HD who presented as a transfer from OSH for neurologic evaluation of AMS and "L sided numbness".  Per chart, pt was driving with his wife when he noted acute onset left sided numbness. Wife was able to pull car over and call 911. EMS found pt confused and combative. Pt was transported to OSH where CT Head showed no acute change and encephalomalacia in L frontal and L parietal region from prior craniotomy (unknown reason or diagnosis). Routine labs showed a K of 6.2 and Cr of 10.2. OSH ED attempted to shift the pts K with insulin. Pt was started on a Versed infusion and intubated at OSH prior to transfer to [facility] for further evaluation. On arrival, pts glucose was noted to be <5 and K was 3.4. CTA Multiphase showed no acute hemorrhage or major vascular distribution infarct and no evidence high-grade stenosis or major vessel occlusion. Pt was started on D10 drip for hypoglycemia and will be admitted to [facility] for further evalaution and higher level of care.     Hospital Course By Problem with Pertinent Findings:       Presented w hypoglycemia, ESRD to another hospital,  After shifting potassium, corrected now and resolved.  MS back to baseline,      No stroke, no  ICB.  HD MWF, nephrology following  Extubated yesterday 12/14.     Consultants and Procedures:   MRI, CT,EEG - all wilian for acute neuro patology     12/16 - puree diet started, norvasc 5 started for BP.  To have HD this afternoon.     Review of Systems:  Constitutional-  Negative for Fever,chills,  Weakness. Reports that he is hungry nd wants to eat  Neuro-  Negative for Confusion, focal " weakness  CardioVascular-  Negative for Chest Pain, Palpitations  Resp-  Negative for Cough, SOB  GI-  Negative for Nausea, Vomiting, Diarrhea  Extremities-  Negative for Pain, Swelling  % Meals-  Was NPO  # BM-   0  Urine output:  0, on HD  Ambulation:  None yet    I & O (Last 24H):    Intake/Output Summary (Last 24 hours) at 12/16/2019 0804  Last data filed at 12/15/2019 1100  Gross per 24 hour   Intake 19.5 ml   Output --   Net 19.5 ml         OBJECTIVE:       Vitals:    12/15/19 2349 12/16/19 0300 12/16/19 0419 12/16/19 0503   BP:   (!) 152/60 133/72   BP Location:   Right arm Right arm   Patient Position:   Lying Lying   Pulse: 105 98 95 93   Resp:   20 19   Temp:   97.3 °F (36.3 °C) 96.3 °F (35.7 °C)   TempSrc:   Oral Oral   SpO2:   (!) 94% 95%   Weight:       Height:           Physical Exam:  General:  Well developed, well nourished in NAD, verbal,   Mental status-  oriented time 3, linear thought process, + focused attention, alert and awake  HEENT:  Conjunctiva clear; Oropharynx clear, moist membranes  Neck:  No JVD noted, Supple, full range of motion  CardioVascular-  Normal rate,  S1, S2 with no murmurs,gallops,rubs  Respiratory-  Lungs CTA Bilaterally, Unlabored, good effort  Abdomen-  Nontender, no rebound, no guarding,  nondistended, +  BS, soft, no hepatosplenomegaly  Extrem-  No cyanosis, clubbing, edema.  Skin-   No rashes, lesions, ulcers, no third spacing dependent edema    Continuous Infusions:  Scheduled Meds:   sodium chloride 0.9%   Intravenous Once    amLODIPine  5 mg Oral Once    atorvastatin  40 mg Per OG tube Daily    heparin (porcine)  5,000 Units Subcutaneous Q8H    polyethylene glycol  17 g Per NG tube Daily    QUEtiapine  50 mg Per OG tube BID    senna-docusate 8.6-50 mg  1 tablet Per OG tube BID     PRN Meds:acetaminophen, albuterol sulfate, calcium gluconate IVPB, calcium gluconate IVPB, calcium gluconate IVPB, Dextrose 10% Bolus, Dextrose 10% Bolus, dextrose 50%, glucagon  (human recombinant), glucose, glucose, hydrALAZINE, insulin aspart U-100, senna-docusate 8.6-50 mg, sodium chloride 0.9%, Pharmacy to dose Vancomycin consult **AND** vancomycin - pharmacy to dose    Chemistry:  Recent Labs     12/13/19  2140 12/14/19  0114 12/15/19  0123    140  140  140  140 142   K 4.0 4.0  4.0  4.0  4.0 4.6    108  108  108  108 108   CO2 24 24  24  24  24 22*   BUN 23* 26*  26*  26*  26* 39*   CREATININE 5.0* 5.3*  5.3*  5.3*  5.3* 7.1*   CALCIUM 8.1* 7.9*  7.9*  7.9*  7.9* 7.7*     Recent Labs     12/13/19  2140 12/14/19  0114 12/15/19  0123   AST  --  13  13 13   ALT  --  12  12 12   ALKPHOS  --  75  75 72   ALBUMIN 2.7* 2.6*  2.6*  2.6*  2.6* 2.6*   PROT  --  5.3*  5.3* 5.2*   BILITOT  --  0.7  0.7 0.6         CBC:  Recent Labs   Lab 12/13/19  0203 12/14/19  0114 12/15/19  0123   WBC 7.85 4.65  4.65 4.00   HGB 9.3* 8.0*  8.0* 8.7*   HCT 29.0* 25.5*  25.5* 27.3*   * 107*  107* 108*   MONO 6.4  0.5 10.1  10.1  0.5  0.5 12.0  0.5         ASSESSMENT/PLAN:     Altered mental status due to hypoglycemia  49 year old male with HTN, DM2 and ESRD on HD who presented to OSH with acute onset L sided weakness and confusion.      - CT Head WO OSH unremarkable for acute infarction or hemorrhage   - CTA Head/neck - no acute change, no high grade stenosis  - SBP <200 mmHg  - EKG NSR , ECHO EF 53%  - SSI  - Q4 POCT Glucose checks  - MRI showed no acute intracranial abnormalities      Dysphagia    - puree diet  - Nepro tid    Agitation  Agitation 2/2 metabolic encephalopathy   -d/c EEG   -acute agitation with correction of glucose   - stopped fentanyl gtt, precedex    - on scheduled quetiapine 50 BID      Pulm  MRSA in sputum  Started on zosyn, de-escalated to ceftriaxone 12/15  12/15 -felt to be colonization , but pt ws on a ventilator.  willl treat w vanc w HD x 2 weeks     Essential hypertension  - Hypertensive on admit,    - Goal SBP <200  -  norvasc 5 po daily  - hydralazine 25 q 8 hours prn SBP > 180        ESRD (end stage renal disease) on dialysis  - HD fistula in E  - Nephrology following   - Renally dose medications  - Strict I/Os   - HD 12/16, scheduled MWF      Hypoglycemia, coma  - Present on admission  - OSH attempted to shift HyperK with insulin    - On arrival, pt with glucose <5  - D10 drip gtt stopped (12/12),   - on 12/12 Glucose (248)  - POCT glucose q4 hour and SSI   - Monitor CMP     Type 2 diabetes mellitus  - Home glipizide held  - on admit glucose <5, started on D10 drip d/t hypoglycemia   - D10 gtt stopped (12/12)   - (12/13) Glucose 131  - on SSI, POCT glucose q4hrs          Recent Labs     12/15/19  0137 12/15/19  0513 12/15/19  0724 12/15/19  1209 12/15/19  1603 12/15/19  2237   POCTGLUCOSE 101 82 78 69* 95 71     12/16 - Puree diet and nepro started    Does patient have Capacity?  No,   Goals- resume HD, pt repeats that he wants to die to nurses  Code Status- FULL  Pain management- well controlled  Prognosis-  fail  Family discussions- not present  Functional Status - need PT/OT .  [facility] 24/12    Post acute care plan:  To be determined       Ilda Talley MD  Senior Hospitalist  [facility]  [id number], [phone]

## 2019-12-16 NOTE — ED PROVIDER NOTES
Encounter Date: 12/11/2019       History     Chief Complaint   Patient presents with    Transfer     Pt transferred from [place].  L sided numbness/weakness started at approx 530am.      49-year-old male who was transferred from another hospital he is intubated is on Versed drip apparently he was taking his wife to her job he started to become of complaint of arm numbness he then became confused and with the paramedics arrived he became combative it was likewise combative at the hospital unable to get a CT scan in this manner was sedated and intubated CT scan was negative his labs likewise showed an elevated potassium and he was given the usual medications for this hyperkalemia patient was transported using a Versed drip        Review of patient's allergies indicates:  No Known Allergies  History reviewed. No pertinent past medical history.  History reviewed. No pertinent surgical history.  History reviewed. No pertinent family history.  Social History     Tobacco Use    Smoking status: Unknown If Ever Smoked   Substance Use Topics    Alcohol use: Not on file    Drug use: Not on file     Review of Systems   Unable to perform ROS: Intubated       Physical Exam     Initial Vitals   BP Pulse Resp Temp SpO2   12/11/19 1326 12/11/19 1326 12/11/19 1831 12/11/19 1326 12/11/19 1326   (!) 180/83 70 (!) 22 98.5 °F (36.9 °C) 100 %      MAP       --                Physical Exam    Constitutional: He is intubated.   HENT:   Head: Normocephalic and atraumatic.   Neck: Neck supple.   Cardiovascular: Regular rhythm and normal heart sounds. Tachycardia present.    Pulmonary/Chest: He is intubated.   Abdominal: He exhibits no distension.   Musculoskeletal:   No muscle abnormalities noted   Neurological:   Patient is intubated   Skin: Skin is warm and dry.         ED Course   Procedures  Labs Reviewed   COMPREHENSIVE METABOLIC PANEL - Abnormal; Notable for the following components:       Result Value    Potassium 6.5 (*)      Glucose <5 (*)     BUN, Bld 54 (*)     Creatinine 10.2 (*)     Calcium 8.5 (*)     eGFR if  6.1 (*)     eGFR if non  5.2 (*)     All other components within normal limits   TROPONIN I - Abnormal; Notable for the following components:    Troponin I 0.187 (*)     All other components within normal limits   B-TYPE NATRIURETIC PEPTIDE - Abnormal; Notable for the following components:    BNP 1,123 (*)     All other components within normal limits   RENAL FUNCTION PANEL - Abnormal; Notable for the following components:    Potassium 6.2 (*)     CO2 22 (*)     BUN, Bld 53 (*)     Calcium 8.2 (*)     Creatinine 9.9 (*)     Phosphorus 5.5 (*)     eGFR if  6.3 (*)     eGFR if non  5.4 (*)     All other components within normal limits   ISTAT PROCEDURE - Abnormal; Notable for the following components:    POC PH 7.326 (*)     POC PCO2 29.4 (*)     POC PO2 71 (*)     POC HCO3 15.4 (*)     POC SATURATED O2 93 (*)     POC Sodium 148 (*)     POC TCO2 16 (*)     POC Ionized Calcium 0.86 (*)     POC Hematocrit 19 (*)     All other components within normal limits   POCT GLUCOSE - Abnormal; Notable for the following components:    POCT Glucose 44 (*)     All other components within normal limits   POCT GLUCOSE - Abnormal; Notable for the following components:    POCT Glucose 142 (*)     All other components within normal limits   ISTAT PROCEDURE - Abnormal; Notable for the following components:    POC PH 7.453 (*)     POC PO2 418 (*)     POC HCO3 30.5 (*)     POC Sodium 134 (*)     POC Potassium 7.1 (*)     POC TCO2 32 (*)     POC Ionized Calcium 0.97 (*)     POC Hematocrit 26 (*)     All other components within normal limits   PROTIME-INR   LIPID PANEL   TSH   LIPID PANEL   TSH   APTT   PROTIME-INR   URINALYSIS, REFLEX TO URINE CULTURE   TYPE & SCREEN   ISTAT LACTATE   POCT GLUCOSE   POCT GLUCOSE        ECG Results          ECG 12 lead (Final result)  Result time 12/12/19  14:18:09    Final result by Interface, Lab In [facility] (12/12/19 14:18:09)                 Narrative:    Test Reason : I63.9,    Vent. Rate : 071 BPM     Atrial Rate : 071 BPM     P-R Int : 164 ms          QRS Dur : 076 ms      QT Int : 410 ms       P-R-T Axes : 017 056 160 degrees     QTc Int : 445 ms    Normal sinus rhythm  Nonspecific ST and T wave abnormality  Abnormal ECG  When compared with ECG of 03-FEB-2019 09:57,  Criteria for Septal infarct are no longer Present  Nonspecific T wave abnormality now evident in Inferior leads  T wave inversion more evident in Lateral leads  Confirmed by SWAPNIL HOYT MD ([id number]) on 12/12/2019 2:18:00 PM    Referred By: MINDY FREEDMAN           Confirmed By:SWAPNIL HOYT MD                            Imaging Results          CTA STROKE MULTI-PHASE (Final result)  Result time 12/11/19 15:23:23    Final result by Marcelino Kelly MD (12/11/19 15:23:23)                 Impression:      Limited examination secondary to patient motion.    No acute hemorrhage or major vascular distribution infarct.    Small focus of apparent encephalomalacia in the posterior left frontal lobe subjacent to a craniotomy site.  Recommend correlation with clinical history and prior imaging.    Atherosclerotic calcification throughout the carotid and vertebral arteries as further discussed above.  No evidence high-grade stenosis or major vessel occlusion.    COMMUNICATION  This result was discovered/received at 1458. The information above was relayed directly by telephone to Dr. Latif on 12/11/2019 at 1503 by Dr. Khan on behalf of Dr. Kelly.    Electronically signed by resident: Catarino Khan  Date:    12/11/2019  Time:    14:51    Electronically signed by: Marcelino Kelly MD  Date:    12/11/2019  Time:    15:23             Narrative:    EXAMINATION:  CTA STROKE MULTI-PHASE    CLINICAL HISTORY:  mental status change;    TECHNIQUE:  Non contrast low dose axial images were obtained thought the head. CT  angiogram was performed from the level of the danny to the top of the head following the IV administration of 100mL of Omnipaque 350.  Two additional acquisitions through the intracranial vasculature via multiphase technique.  Multiplanar reconstructions and maximum intensity projection reconstructions were performed. Arterial stenosis percentages are based on NASCET measurement criteria.    Examination limited by patient motion artifact.    COMPARISON:  None    FINDINGS:  Head:    Ventricles and sulci are normal in size for age without evidence of hydrocephalus.    No extra-axial blood or fluid collections.    Small focus of hypoattenuation thought to reflect encephalomalacia in posterior left frontal lobe subjacent to craniotomy site.  No focal parenchymal abnormalities elsewhere.  No acute hemorrhage.  No acute major vascular distribution infarct..    Postsurgical change of prior left parietal craniotomy.  No fracture.    Moderate patchy mucosal thickening throughout the paranasal sinuses.  Mastoid air cells are essentially clear.    Non-Vascular Structures of the Neck/Thoracic Inlet (limited evaluation): Endotracheal tube in place with tip terminating above the danny.  Enteric tube in place within the esophagus.  Visualized lung apices demonstrate no significant abnormality.  Osseous structures demonstrate no displaced fracture or destructive process.    Aorta: 2 vessel arch with atherosclerotic plaque.    Extracranial carotid circulation: Atherosclerotic plaque at the carotid bifurcations and throughout the cervical resulting in less than 50% stenosis.  Mobile calcification elsewhere within the internal carotid arteries without associated narrowing.  No aneurysmal dilatation or dissection.    Extracranial vertebral circulation: Extensive atherosclerotic calcification within the V3 and V4 segments of the vertebral arteries without hemodynamically significant stenosis.  No aneurysmal dilatation or  dissection.    Intracranial Arteries: No focal high-grade stenosis, occlusion, or aneurysm within the ACAs are MCAs.  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    The basilar artery is patent without aneurysm or high-grade stenosis.  Mild stenosis of the distal P1 segment of the left PCA.  Left posterior communicating artery is patent.  Right PCA is patent without significant stenosis.                               X-Ray Chest AP Portable (Final result)  Result time 12/11/19 14:25:07    Final result by Aylin Oswald MD (12/11/19 14:25:07)                 Impression:      Clear lungs.      Electronically signed by: Aylin Oswald MD  Date:    12/11/2019  Time:    14:25             Narrative:    EXAMINATION:  XR CHEST AP PORTABLE    CLINICAL HISTORY:  Stroke;    TECHNIQUE:  Single frontal view of the chest was performed.    COMPARISON:  None    FINDINGS:  ET tube is present with tip 5cm above the danny.  ET tube extend below the diaphragm with tip out of the field of view.  The mediastinal structures are midline.  The cardiac silhouette is not enlarged.  Lungs are clear.                              X-Rays:   Independently Interpreted Readings:   Chest X-Ray: No acute abnormalities. Endotracheal tube position properly   Head CT: CTA read by the radiologist showed no acute process     Medical Decision Making:   Initial Assessment:   49-year-old male transferred here for evaluation of altered mental status beginning as not arm numbness evolving into confusion and combativeness required intubation for CT which was negative patient was noted have a potassium of 6.8 and was given medication for this  Clinical Tests:   Lab Tests: Ordered and Reviewed  Radiological Study: Ordered and Reviewed  Medical Tests: Ordered and Reviewed  ED Management:  Patient is in critical condition will do labs including a lactic acid also get an ABG will continue to monitor him              Attending  Attestation:             Attending ED Notes:   49-year-old male was transferred here today to the ED for evaluation is this after he had a change in mental status apparently was driving complained of arm numbness then began to become confused at which time wife pulled the car over he became combative with the paramedics had to be given medication without much success for for he to receive a CT scan subsequently he required RSI for intubation lab values noted at that time were a markedly elevated potassium he was given the regular medications including insulin and glucose CT scan there was negative her feet acute process and was sent here for further evaluation patient was kept on a Versed drip and while the patient was being evaluated in the ED was found to have a blood sugar of 20 patient subsequently had a CTA which was negative there is a 30 that possibly all this time the blood sugar has been 20 and this has made the patient in this situation that he is.  Patient was taken off the panel drip in hopes of having him awaken further a required a D 10 drip patient was evaluated by stroke neurology also by the neuro ICU team and the patient was subsequently admitted to the neuro critical care in critical condition                        Clinical Impression:       ICD-10-CM ICD-9-CM   1. Hypoglycemia E16.2 251.2   2. Stroke I63.9 434.91   3. Acute encephalopathy G93.40 348.30   4. Acute respiratory failure with hypoxia J96.01 518.81   5. Altered mental status, unspecified altered mental status type R41.82 780.97   6. ESRD (end stage renal disease) on dialysis N18.6 585.6    Z99.2 V45.11   7. Essential hypertension I10 401.9   8. Hyperkalemia E87.5 276.7   9. Hypoglycemia, coma E15 251.0   10. Cerebrovascular accident (CVA), unspecified mechanism I63.9 434.91         Disposition:   Disposition: Admitted  Condition: Critical                     Román Denney MD  12/16/19 0023

## 2019-12-16 NOTE — PT/OT/SLP PROGRESS
Dr. Burnham aware of cardiology consult for chest pain and hypotension.  Wants to be notified if continues to be hypotensive.  Will continue to monitor.     Speech Language Pathology Treatment    Patient Name:  Balta Lamb   MRN:  [id number]  Admitting Diagnosis: Altered mental status    Recommendations:                General Recommendations:  follow up to ensure tolerance & to determine safest/least restrictive PO consistencies  Diet recommendations:  Puree, Liquid Diet Level: Thin   Aspiration Precautions: 1 bite/sip at a time, Assistance with meals, Feed only when awake/alert, HOB to 90 degrees, Meds crushed in puree, Small bites/sips and Strict aspiration precautions   General Precautions: Standard, fall, pureed diet, contact    Subjective   Awake & alert. Pt speaks Icelandic but understands basic English per sister yesterday.     Pain/Comfort:  · Pain Rating 1: 0/10(none indicated)  · Pain Rating Post-Intervention 2: 0/10    Objective:     Has the patient been evaluated by SLP for swallowing?   Yes  Keep patient NPO? No   Current Respiratory Status: room air      Pt repositioned to upright. Pt able to elicit adequate volitional cough & volitional swallow. Voicing is clear prior to PO trials. Pt with spontaneous coughing prior to PO trials. Pt tolerated cup sips of thin x7 with no s/s aspiration. Given solid trial (cracker), pt with significantly prolonged mastication & oral residue, requiring verbal cueing to clear oral cavity, no overt s/s aspiration. Pt tolerated bites of puree x4 wih efficient oral phase & no s/s aspiration. SLP educated pt on all recs, precautions, risks & s/s aspiration, role of SLP,m POC, etc. Pt verbalized understanding. Whiteboard updated & communicated recs with NSG.     Assessment:     Balta Lamb is a 49 y.o. male with an SLP diagnosis of Dysphagia.     Goals:   Multidisciplinary Problems     SLP Goals        Problem: SLP Goal    Goal Priority Disciplines Outcome   SLP Goal     SLP Ongoing, Progressing   Description:  Speech Language Pathology Goals  Goals expected to be met by 12/23  1. Pt will tolerate puree & thin liquids without  s/s aspiration  2. Ongoing swallow assessment to determine safest & least restrictive PO consistencies    Speech Language Pathology Goals  Goals expected to be met by 12/22   1. Ongoing swallow assessment MET                       Plan:     · Patient to be seen:  4 x/week   · Plan of Care expires:  01/13/20  · Plan of Care reviewed with:  patient   · SLP Follow-Up:  Yes       Discharge recommendations:  (tbd)     Time Tracking:     SLP Treatment Date:   12/16/19  Speech Start Time:  1008  Speech Stop Time:  1024     Speech Total Time (min):  16 min    Billable Minutes: Treatment Swallowing Dysfunction 8 and Seld Care/Home Management Training 8    Nikky Duff CCC-SLP  12/16/2019

## 2019-12-17 PROBLEM — Z74.09 IMPAIRED MOBILITY AND ADLS: Status: ACTIVE | Noted: 2019-12-17

## 2019-12-17 PROBLEM — Z78.9 IMPAIRED MOBILITY AND ADLS: Status: ACTIVE | Noted: 2019-12-17

## 2019-12-17 LAB
ALBUMIN SERPL BCP-MCNC: 3.3 G/DL (ref 3.5–5.2)
ALP SERPL-CCNC: 88 U/L (ref 55–135)
ALT SERPL W/O P-5'-P-CCNC: 28 U/L (ref 10–44)
ANION GAP SERPL CALC-SCNC: 17 MMOL/L (ref 8–16)
AST SERPL-CCNC: 36 U/L (ref 10–40)
BASOPHILS # BLD AUTO: 0.02 K/UL (ref 0–0.2)
BASOPHILS NFR BLD: 0.4 % (ref 0–1.9)
BILIRUB SERPL-MCNC: 0.6 MG/DL (ref 0.1–1)
BUN SERPL-MCNC: 24 MG/DL (ref 6–20)
CALCIUM SERPL-MCNC: 8.6 MG/DL (ref 8.7–10.5)
CHLORIDE SERPL-SCNC: 103 MMOL/L (ref 95–110)
CO2 SERPL-SCNC: 24 MMOL/L (ref 23–29)
CREAT SERPL-MCNC: 5.8 MG/DL (ref 0.5–1.4)
DIFFERENTIAL METHOD: ABNORMAL
EOSINOPHIL # BLD AUTO: 0.1 K/UL (ref 0–0.5)
EOSINOPHIL NFR BLD: 2.1 % (ref 0–8)
ERYTHROCYTE [DISTWIDTH] IN BLOOD BY AUTOMATED COUNT: 13.5 % (ref 11.5–14.5)
EST. GFR  (AFRICAN AMERICAN): 12 ML/MIN/1.73 M^2
EST. GFR  (NON AFRICAN AMERICAN): 10.4 ML/MIN/1.73 M^2
FERRITIN SERPL-MCNC: 2294 NG/ML (ref 20–300)
GLUCOSE SERPL-MCNC: 99 MG/DL (ref 70–110)
HCT VFR BLD AUTO: 25.8 % (ref 40–54)
HGB BLD-MCNC: 8.6 G/DL (ref 14–18)
IMM GRANULOCYTES # BLD AUTO: 0.05 K/UL (ref 0–0.04)
IMM GRANULOCYTES NFR BLD AUTO: 1 % (ref 0–0.5)
IRON SERPL-MCNC: 122 UG/DL (ref 45–160)
LYMPHOCYTES # BLD AUTO: 0.7 K/UL (ref 1–4.8)
LYMPHOCYTES NFR BLD: 14.9 % (ref 18–48)
MAGNESIUM SERPL-MCNC: 2.1 MG/DL (ref 1.6–2.6)
MCH RBC QN AUTO: 34.3 PG (ref 27–31)
MCHC RBC AUTO-ENTMCNC: 33.3 G/DL (ref 32–36)
MCV RBC AUTO: 103 FL (ref 82–98)
MONOCYTES # BLD AUTO: 0.6 K/UL (ref 0.3–1)
MONOCYTES NFR BLD: 12.6 % (ref 4–15)
NEUTROPHILS # BLD AUTO: 3.3 K/UL (ref 1.8–7.7)
NEUTROPHILS NFR BLD: 69 % (ref 38–73)
NRBC BLD-RTO: 0 /100 WBC
PHOSPHATE SERPL-MCNC: 4.2 MG/DL (ref 2.7–4.5)
PLATELET # BLD AUTO: 143 K/UL (ref 150–350)
PMV BLD AUTO: 11.2 FL (ref 9.2–12.9)
POCT GLUCOSE: 114 MG/DL (ref 70–110)
POCT GLUCOSE: 140 MG/DL (ref 70–110)
POCT GLUCOSE: 213 MG/DL (ref 70–110)
POCT GLUCOSE: 95 MG/DL (ref 70–110)
POTASSIUM SERPL-SCNC: 3.7 MMOL/L (ref 3.5–5.1)
PROT SERPL-MCNC: 6.3 G/DL (ref 6–8.4)
RBC # BLD AUTO: 2.51 M/UL (ref 4.6–6.2)
SATURATED IRON: 90 % (ref 20–50)
SODIUM SERPL-SCNC: 144 MMOL/L (ref 136–145)
TOTAL IRON BINDING CAPACITY: 135 UG/DL (ref 250–450)
TRANSFERRIN SERPL-MCNC: 91 MG/DL (ref 200–375)
WBC # BLD AUTO: 4.83 K/UL (ref 3.9–12.7)

## 2019-12-17 PROCEDURE — 99232 PR SUBSEQUENT HOSPITAL CARE,LEVL II: ICD-10-PCS | Mod: ,,, | Performed by: INTERNAL MEDICINE

## 2019-12-17 PROCEDURE — 83735 ASSAY OF MAGNESIUM: CPT

## 2019-12-17 PROCEDURE — 99222 PR INITIAL HOSPITAL CARE,LEVL II: ICD-10-PCS | Mod: ,,, | Performed by: NURSE PRACTITIONER

## 2019-12-17 PROCEDURE — 63600175 PHARM REV CODE 636 W HCPCS: Performed by: PSYCHIATRY & NEUROLOGY

## 2019-12-17 PROCEDURE — 85025 COMPLETE CBC W/AUTO DIFF WBC: CPT

## 2019-12-17 PROCEDURE — 63600175 PHARM REV CODE 636 W HCPCS: Performed by: HOSPITALIST

## 2019-12-17 PROCEDURE — 99232 SBSQ HOSP IP/OBS MODERATE 35: CPT | Mod: ,,, | Performed by: INTERNAL MEDICINE

## 2019-12-17 PROCEDURE — 25000003 PHARM REV CODE 250: Performed by: PSYCHIATRY & NEUROLOGY

## 2019-12-17 PROCEDURE — 99222 1ST HOSP IP/OBS MODERATE 55: CPT | Mod: ,,, | Performed by: NURSE PRACTITIONER

## 2019-12-17 PROCEDURE — 97535 SELF CARE MNGMENT TRAINING: CPT

## 2019-12-17 PROCEDURE — 83540 ASSAY OF IRON: CPT

## 2019-12-17 PROCEDURE — 63600175 PHARM REV CODE 636 W HCPCS: Performed by: FAMILY MEDICINE

## 2019-12-17 PROCEDURE — 97162 PT EVAL MOD COMPLEX 30 MIN: CPT

## 2019-12-17 PROCEDURE — 25000003 PHARM REV CODE 250: Performed by: NURSE PRACTITIONER

## 2019-12-17 PROCEDURE — 97803 MED NUTRITION INDIV SUBSEQ: CPT

## 2019-12-17 PROCEDURE — 25000003 PHARM REV CODE 250: Performed by: STUDENT IN AN ORGANIZED HEALTH CARE EDUCATION/TRAINING PROGRAM

## 2019-12-17 PROCEDURE — 82728 ASSAY OF FERRITIN: CPT

## 2019-12-17 PROCEDURE — 80053 COMPREHEN METABOLIC PANEL: CPT

## 2019-12-17 PROCEDURE — 11000001 HC ACUTE MED/SURG PRIVATE ROOM

## 2019-12-17 PROCEDURE — 25000003 PHARM REV CODE 250: Performed by: HOSPITALIST

## 2019-12-17 PROCEDURE — 36415 COLL VENOUS BLD VENIPUNCTURE: CPT

## 2019-12-17 PROCEDURE — 84100 ASSAY OF PHOSPHORUS: CPT

## 2019-12-17 PROCEDURE — 97530 THERAPEUTIC ACTIVITIES: CPT

## 2019-12-17 RX ORDER — SODIUM CHLORIDE 9 MG/ML
INJECTION, SOLUTION INTRAVENOUS ONCE
Status: COMPLETED | OUTPATIENT
Start: 2019-12-18 | End: 2019-12-18

## 2019-12-17 RX ORDER — HALOPERIDOL 5 MG/ML
5 INJECTION INTRAMUSCULAR ONCE
Status: DISCONTINUED | OUTPATIENT
Start: 2019-12-17 | End: 2019-12-17

## 2019-12-17 RX ORDER — HYDRALAZINE HYDROCHLORIDE 20 MG/ML
10 INJECTION INTRAMUSCULAR; INTRAVENOUS ONCE
Status: COMPLETED | OUTPATIENT
Start: 2019-12-17 | End: 2019-12-17

## 2019-12-17 RX ORDER — NIFEDIPINE 60 MG/1
60 TABLET, EXTENDED RELEASE ORAL DAILY
Status: DISCONTINUED | OUTPATIENT
Start: 2019-12-18 | End: 2019-12-23 | Stop reason: HOSPADM

## 2019-12-17 RX ORDER — LORAZEPAM 2 MG/ML
INJECTION INTRAMUSCULAR
Status: DISPENSED
Start: 2019-12-17 | End: 2019-12-17

## 2019-12-17 RX ORDER — HALOPERIDOL 5 MG/ML
1 INJECTION INTRAMUSCULAR EVERY 6 HOURS PRN
Status: DISCONTINUED | OUTPATIENT
Start: 2019-12-17 | End: 2019-12-23 | Stop reason: HOSPADM

## 2019-12-17 RX ADMIN — QUETIAPINE FUMARATE 50 MG: 25 TABLET ORAL at 09:12

## 2019-12-17 RX ADMIN — HALOPERIDOL LACTATE 1 MG: 5 INJECTION INTRAMUSCULAR at 12:12

## 2019-12-17 RX ADMIN — HYDRALAZINE HYDROCHLORIDE 50 MG: 50 TABLET, FILM COATED ORAL at 09:12

## 2019-12-17 RX ADMIN — HYDRALAZINE HYDROCHLORIDE 10 MG: 20 INJECTION INTRAMUSCULAR; INTRAVENOUS at 06:12

## 2019-12-17 RX ADMIN — SEVELAMER CARBONATE 0.8 G: 800 POWDER, FOR SUSPENSION ORAL at 12:12

## 2019-12-17 RX ADMIN — HYDRALAZINE HYDROCHLORIDE 10 MG: 20 INJECTION INTRAMUSCULAR; INTRAVENOUS at 04:12

## 2019-12-17 RX ADMIN — NIFEDIPINE 30 MG: 30 TABLET, FILM COATED, EXTENDED RELEASE ORAL at 09:12

## 2019-12-17 RX ADMIN — ATORVASTATIN CALCIUM 40 MG: 20 TABLET, FILM COATED ORAL at 09:12

## 2019-12-17 RX ADMIN — POLYETHYLENE GLYCOL 3350 17 G: 17 POWDER, FOR SOLUTION ORAL at 09:12

## 2019-12-17 RX ADMIN — HYDRALAZINE HYDROCHLORIDE 50 MG: 50 TABLET, FILM COATED ORAL at 03:12

## 2019-12-17 RX ADMIN — CLONIDINE HYDROCHLORIDE 0.1 MG: 0.1 TABLET ORAL at 09:12

## 2019-12-17 RX ADMIN — HEPARIN SODIUM 5000 UNITS: 5000 INJECTION, SOLUTION INTRAVENOUS; SUBCUTANEOUS at 06:12

## 2019-12-17 RX ADMIN — LORAZEPAM 2 MG: 2 INJECTION, SOLUTION INTRAMUSCULAR; INTRAVENOUS at 01:12

## 2019-12-17 RX ADMIN — HEPARIN SODIUM 5000 UNITS: 5000 INJECTION, SOLUTION INTRAVENOUS; SUBCUTANEOUS at 03:12

## 2019-12-17 RX ADMIN — HEPARIN SODIUM 5000 UNITS: 5000 INJECTION, SOLUTION INTRAVENOUS; SUBCUTANEOUS at 09:12

## 2019-12-17 RX ADMIN — INSULIN ASPART 2 UNITS: 100 INJECTION, SOLUTION INTRAVENOUS; SUBCUTANEOUS at 09:12

## 2019-12-17 RX ADMIN — SENNOSIDES AND DOCUSATE SODIUM 1 TABLET: 8.6; 5 TABLET ORAL at 09:12

## 2019-12-17 NOTE — ASSESSMENT & PLAN NOTE
-  Acute metabolic encephalopathy 2/2 hypoglycemia   -  CTA multiphase impression without acute hemorrhage, major vascular distribution infarct, or high-grade stenosis/major vessel occlusion  -  Camera sitter at bedside  -  Management per primary team

## 2019-12-17 NOTE — PT/OT/SLP EVAL
"Physical Therapy Evaluation    Patient Name:  Balta Lamb   MRN:  [id number]    Recommendations:     Discharge Recommendations:  rehabilitation facility   Discharge Equipment Recommendations: bath bench   Barriers to discharge: Inaccessible home and Decreased caregiver support    Assessment:     Balta Lamb is a 49 y.o. male admitted with a medical diagnosis of Altered mental status.  He presents with the following impairments/functional limitations:  weakness, impaired functional mobilty, impaired self care skills, impaired endurance, gait instability, impaired balance, impaired cognition, decreased upper extremity function, decreased lower extremity function, decreased safety awareness, impaired cardiopulmonary response to activity. Tolerated session c c/o fatigue and weakness.  Pt requiring extended time to complete task and mod cues for proper technique and safety.  Pt performed mobility c SBA-mod A.  Pt able to take few steps along EOB c HHAx2 and demo decreased gait speed, decreased toe/floor clearance and unsteadiness (posterior lean). Pt safe to transfer to/from bedside chair c assistance of 1x person (stand pivot) but will need supervision d/t AMS.  Pt would benefit from continued skilled acute PT 3x/wk to improve functional mobility.  Recommending pt receive PT services in rehab setting following d/c from hospital once medically cleared.      Rehab Prognosis: Good; patient would benefit from acute skilled PT services to address these deficits and reach maximum level of function.    Recent Surgery: * No surgery found *      Plan:     During this hospitalization, patient to be seen 3 x/week to address the identified rehab impairments via gait training, therapeutic activities, therapeutic exercises, neuromuscular re-education and progress toward the following goals:    · Plan of Care Expires:  01/11/20    Subjective     Chief Complaint: fatigue  Patient/Family Comments/goals: "It feels weak" - re to " LE  Pain/Comfort:  · Pain Rating 1: 0/10    Patients cultural, spiritual, Confucianist conflicts given the current situation: no    Living Environment:  Pt lives c wife in trailer c 3STE 0HR.  PTA reports no falls.  Prior to admission, patients level of function was independent.  Equipment used at home: cane, straight.  DME owned (not currently used): none.  Upon discharge, patient will have assistance from family.    Objective:     Communicated with RN and OT prior to session.  Patient found HOB elevated with peripheral IV, restraints, bed alarm  upon PT entry to room.    General Precautions: Standard, fall   Orthopedic Precautions:N/A   Braces: N/A     Exams:  · Cognitive Exam:  Patient is oriented to Person, Place, Time and Situation  · RLE ROM: WFL  · RLE Strength: grossly 4/5  · LLE ROM: WFL  · LLE Strength: grossly 4/5    Functional Mobility:  · Bed Mobility:     · Rolling Right: stand by assistance  · Scooting: stand by assistance  · Supine to Sit: stand by assistance  · Sit to Supine: stand by assistance  · Transfers:     · Sit to Stand:  minimum assistance with hand-held assist  · Gait: 3 steps c HHAx1 mod A   · demo decreased gait speed, decreased toe/floor clearance and unsteadiness (posterior lean)  · Balance: sitting (SBA-CGA); standing (mod A)      Therapeutic Activities and Exercises:  Pt educated on: PT role/POC; safety c mobility; benefits of OOB activities; performing therex; d/c recs - v/u  -sat EOB x6mins  -sit<>stand from bed 6x  -standing ~30sec/ea  -bedding and gown changed d/t soiling  -repositioned in bed for comfort    AM-PAC 6 CLICK MOBILITY  Total Score:14     Patient left HOB elevated with all lines intact, call button in reach, bed alarm on, restraints reapplied at end of session, RN notified and family present.    GOALS:   Multidisciplinary Problems     Physical Therapy Goals        Problem: Physical Therapy Goal    Goal Priority Disciplines Outcome Goal Variances Interventions    Physical Therapy Goal     PT, PT/OT Ongoing, Progressing     Description:  Goals to be met by: 2020     Patient will increase functional independence with mobility by performin. Supine to sit with Set-up [place]  2. Sit to supine with Set-up [place]  3. Sit to stand transfer with Contact Guard Assistance  4. Bed to chair transfer with Contact Guard Assistance using Rolling Walker  5. Gait  x 100 feet with Contact Guard Assistance using Rolling Walker.   6. Ascend/descend 3 stair with right Handrails Contact Guard Assistance                    History:     History reviewed. No pertinent past medical history.    History reviewed. No pertinent surgical history.    Time Tracking:     PT Received On: 19  PT Start Time: 919     PT Stop Time: 937  PT Total Time (min): 18 min     Billable Minutes: Evaluation 10 min and Therapeutic Activity 8 min      Ad Gallardo, PT  2019

## 2019-12-17 NOTE — PLAN OF CARE
Problem: Occupational Therapy Goal  Goal: Occupational Therapy Goal  Description  Goals set 12/15 to be addressed for 14 days with expiration date, 12/29:  Patient will increase functional independence with ADLs by performing:    Patient will demonstrate rolling to the right with supervision.     Patient will demonstrate rolling to the left with supervision.  Patient will demonstrate supine -sit with supervision.     Patient will demonstrate stand pivot transfers with min assist.     Patient will demonstrate grooming while seated with setup (A).  Patient will demonstrate upper body dressing with min assist while seated EOB.   Progressing 12/17  Patient will demonstrate lower body dressing with min assist while seated EOB.     Patient will demonstrate toileting with min assist.     Patient will demonstrate bathing while seated EOB with min assist.     Patient's family / caregiver will demonstrate independence and safety with assisting patient with self-care skills and functional mobility.         Pt met bed mobility goals and grooming goals this date with POC updated in correlation with pt progress. Continue OT POC as tolerated.  Freya Gifford OT  12/17/2019    Outcome: Ongoing, Progressing

## 2019-12-17 NOTE — PROGRESS NOTES
"              [facility] Hospital Medicine  Progress Note    Team: [facility] MED N   Attending MD: Milana Xavier MD  Admit Date: 12/11/2019  [place]   Length of Stay:  LOS: 6 days   Code status: Full Code    Principal Problem: Altered mental status    Interval hx: patient with increasing confusion on 12/16 requiring ativan, haldol due to agitation. Now sleepy today.  Behavior escalated after HD but his sister at bedside states that he was confused all day; she noted he was hallucinating overnight; wrist restraints placed overnight now off;     Events from admit reviewed.    ROS:  Constitutional: no fever or chills  Respiratory: no cough or shortness of breath  Cardiovascular: no chest pain or palpitations  Gastrointestinal: no nausea or vomiting, no abdominal pain; last BM: 12/17  Genitourinary: no hematuria or dysuria  Integument/Breast: no rash or pruritis  Musculoskeletal: no arthralgias or myalgias  Neurological: no seizures or tremors  Behavioral/Psych: no auditory or visual hallucinations      Physical Exam  Temp:  [97.9 °F (36.6 °C)-98.3 °F (36.8 °C)] 98.3 °F (36.8 °C)  Pulse:  [] 89  Resp:  [14-18] 16  SpO2:  [93 %-94 %] 93 %  BP: (171-224)/(72-94) 172/72      Intake/Output Summary (Last 24 hours) at 12/17/2019 1533  Last data filed at 12/16/2019 1739  Gross per 24 hour   Intake 650 ml   Output 3650 ml   Net -3000 ml       Wt Readings from Last 1 Encounters:   12/12/19 1205 77.6 kg (171 lb)   12/11/19 2217 77.6 kg (171 lb 1.2 oz)   12/11/19 1336 77.6 kg (171 lb)        Estimated body mass index is 24.54 kg/m² as calculated from the following:    Height as of this encounter: 5' 10" (1.778 m).    Weight as of this encounter: 77.6 kg (171 lb).    General: well developed, well nourished, no distress  Lungs:  clear to auscultation bilaterally and normal respiratory effort.  Cardiovascular: Heart: regular rate and rhythm, S1, S2 normal, no murmur, click, rub or gallop. Chest Wall: no " tenderness. Extremities: no cyanosis, no edema, no clubbing.   Abdomen/Rectal: Abdomen: soft, non-tender non-distended; bowel sounds normal; no masses,  no organomegaly.   Skin: Skin color, texture, turgor normal. No rashes or lesions.  Neurologic: Normal strength and tone. No focal numbness or weakness.  Psych/Behavioral:  somnolent, asking appropriate questions      Recent Results (from the past 24 hour(s))   POCT glucose    Collection Time: 12/16/19  9:58 PM   Result Value Ref Range    POCT Glucose 91 70 - 110 mg/dL   Comprehensive metabolic panel    Collection Time: 12/17/19  5:07 AM   Result Value Ref Range    Sodium 144 136 - 145 mmol/L    Potassium 3.7 3.5 - 5.1 mmol/L    Chloride 103 95 - 110 mmol/L    CO2 24 23 - 29 mmol/L    Glucose 99 70 - 110 mg/dL    BUN, Bld 24 (H) 6 - 20 mg/dL    Creatinine 5.8 (H) 0.5 - 1.4 mg/dL    Calcium 8.6 (L) 8.7 - 10.5 mg/dL    Total Protein 6.3 6.0 - 8.4 g/dL    Albumin 3.3 (L) 3.5 - 5.2 g/dL    Total Bilirubin 0.6 0.1 - 1.0 mg/dL    Alkaline Phosphatase 88 55 - 135 U/L    AST 36 10 - 40 U/L    ALT 28 10 - 44 U/L    Anion Gap 17 (H) 8 - 16 mmol/L    eGFR if African American 12.0 (A) >60 mL/min/1.73 m^2    eGFR if non African American 10.4 (A) >60 mL/min/1.73 m^2   CBC auto differential    Collection Time: 12/17/19  5:07 AM   Result Value Ref Range    WBC 4.83 3.90 - 12.70 K/uL    RBC 2.51 (L) 4.60 - 6.20 M/uL    Hemoglobin 8.6 (L) 14.0 - 18.0 g/dL    Hematocrit 25.8 (L) 40.0 - 54.0 %    Mean Corpuscular Volume 103 (H) 82 - 98 fL    Mean Corpuscular Hemoglobin 34.3 (H) 27.0 - 31.0 pg    Mean Corpuscular Hemoglobin Conc 33.3 32.0 - 36.0 g/dL    RDW 13.5 11.5 - 14.5 %    Platelets 143 (L) 150 - 350 K/uL    MPV 11.2 9.2 - 12.9 fL    Immature Granulocytes 1.0 (H) 0.0 - 0.5 %    Gran # (ANC) 3.3 1.8 - 7.7 K/uL    Immature Grans (Abs) 0.05 (H) 0.00 - 0.04 K/uL    Lymph # 0.7 (L) 1.0 - 4.8 K/uL    Mono # 0.6 0.3 - 1.0 K/uL    Eos # 0.1 0.0 - 0.5 K/uL    Baso # 0.02 0.00 - 0.20 K/uL     nRBC 0 0 /100 WBC    Gran% 69.0 38.0 - 73.0 %    Lymph% 14.9 (L) 18.0 - 48.0 %    Mono% 12.6 4.0 - 15.0 %    Eosinophil% 2.1 0.0 - 8.0 %    Basophil% 0.4 0.0 - 1.9 %    Differential Method Automated    Magnesium    Collection Time: 12/17/19  5:07 AM   Result Value Ref Range    Magnesium 2.1 1.6 - 2.6 mg/dL   Phosphorus    Collection Time: 12/17/19  5:07 AM   Result Value Ref Range    Phosphorus 4.2 2.7 - 4.5 mg/dL   Iron and TIBC    Collection Time: 12/17/19  5:07 AM   Result Value Ref Range    Iron 122 45 - 160 ug/dL    Transferrin 91 (L) 200 - 375 mg/dL    TIBC 135 (L) 250 - 450 ug/dL    Saturated Iron 90 (H) 20 - 50 %   Ferritin    Collection Time: 12/17/19  5:07 AM   Result Value Ref Range    Ferritin 2,294 (H) 20.0 - 300.0 ng/mL   POCT glucose    Collection Time: 12/17/19  8:25 AM   Result Value Ref Range    POCT Glucose 95 70 - 110 mg/dL   POCT glucose    Collection Time: 12/17/19 12:03 PM   Result Value Ref Range    POCT Glucose 114 (H) 70 - 110 mg/dL       Recent Labs   Lab 12/15/19  0123 12/16/19  0525 12/17/19  0507   WBC 4.00 4.36 4.83   HGB 8.7* 7.3* 8.6*   HCT 27.3* 23.8* 25.8*   * 133* 143*       Recent Labs   Lab 12/15/19  0123 12/16/19  0525 12/17/19  0507    144 144   K 4.6 4.5 3.7    106 103   CO2 22* 19* 24   BUN 39* 57* 24*   CREATININE 7.1* 9.4* 5.8*    78 99   CALCIUM 7.7* 7.9* 8.6*   MG 2.2 2.4 2.1   PHOS 5.9* 7.3* 4.2       Recent Labs   Lab 12/11/19  1406 12/11/19  1524  12/15/19  0123 12/16/19  0525 12/17/19  0507   ALKPHOS 104  --    < > 72 78 88   ALT 17  --    < > 12 19 28   AST 16  --    < > 13 21 36   ALBUMIN 4.3 3.9   < > 2.6* 3.0* 3.3*   PROT 7.4  --    < > 5.2* 6.0 6.3   BILITOT 0.9  --    < > 0.6 0.6 0.6   INR 1.1 1.1  --   --   --   --     < > = values in this interval not displayed.       Recent Labs   Lab 12/15/19  1603 12/15/19  2237 12/16/19  1432 12/16/19  2158 12/17/19  0825 12/17/19  1203   POCTGLUCOSE 95 71 136* 91 95 114*       Hemoglobin A1C    Date Value Ref Range Status   12/11/2019 7.5 (H) 4.0 - 5.6 % Final     Comment:     ADA Screening Guidelines:  5.7-6.4%  Consistent with prediabetes  >or=6.5%  Consistent with diabetes  High levels of fetal hemoglobin interfere with the HbA1C  assay. Heterozygous hemoglobin variants (HbS, HgC, etc)do  not significantly interfere with this assay.   However, presence of multiple variants may affect accuracy.         Scheduled Meds:   [START ON 12/18/2019] sodium chloride 0.9%   Intravenous Once    amLODIPine  5 mg Oral Once    atorvastatin  40 mg Per OG tube Daily    cloNIDine  0.1 mg Oral BID    heparin (porcine)  5,000 Units Subcutaneous Q8H    hydrALAZINE  50 mg Oral Q8H    NIFEdipine  30 mg Oral Daily    polyethylene glycol  17 g Per NG tube Daily    QUEtiapine  50 mg Per OG tube BID    senna-docusate 8.6-50 mg  1 tablet Per OG tube BID    sevelamer carbonate  0.8 g Oral TID WM       Continuous Infusions:    As Needed: acetaminophen, albuterol sulfate, calcium gluconate IVPB, calcium gluconate IVPB, calcium gluconate IVPB, Dextrose 10% Bolus, Dextrose 10% Bolus, dextrose 50%, glucagon (human recombinant), glucose, glucose, haloperidol lactate, hydrALAZINE, hydrALAZINE, insulin aspart U-100, melatonin, senna-docusate 8.6-50 mg, sodium chloride 0.9%, Pharmacy to dose Vancomycin consult **AND** vancomycin - pharmacy to dose    Active Hospital Problems    Diagnosis  POA    *Altered mental status [R41.82]  Yes    Impaired mobility and ADLs [Z74.09]  No    Agitation [R45.1]  Unknown    Essential hypertension [I10]  Yes    ESRD (end stage renal disease) on dialysis [N18.6, Z99.2]  Not Applicable    Type 2 diabetes mellitus [E11.9]  Yes    Hypoglycemia, coma [E15]  Yes    Acute respiratory failure with hypoxia [J96.01]  Yes    Hyperkalemia [E87.5]  Yes    Metabolic acidosis [E87.2]  Yes    Hypoglycemia [E16.2]  Yes    Acute encephalopathy [G93.40]  Yes      Resolved Hospital Problems    Diagnosis  Date Resolved POA    On mechanically assisted ventilation [Z99.11] 12/15/2019 Not Applicable         Assessment and Plan    Altered mental status due to hypoglycemia    49 year old male with HTN, DM2 and ESRD on HD who presented to OSH with acute onset L sided weakness and confusion.   - CT Head WO OSH unremarkable for acute infarction or hemorrhage   - CTA Head/neck - no acute change, no high grade stenosis  - SBP <200 mmHg  - EKG NSR , ECHO EF 53%  - SSI  - Q4 POCT Glucose checks  - MRI showed no acute intracranial abnormalities   12/17/2019: had an episode of hypoglycemia on 12/15 and glucoses > 150 prior; not on diabetic diet; maintain glucoses > 70 to ensure hypoglycemia not causing behavior changes; repeat CT as patient's sister states this behavior is similar to previous when he had head injury     Dysphagia    - puree diet with thin liquids  - Nepro tid    Agitation  Agitation 2/2 metabolic encephalopathy   -d/c EEG   -acute agitation with correction of glucose   - stopped fentanyl gtt, precedex    - on scheduled quetiapine 50 BID      Infectious Disease  MRSA in sputum  Started on zosyn, de-escalated to ceftriaxone 12/15  12/15 -felt to be colonization , but pt was on a ventilator.  willl treat w vanc w HD x 2 weeks     Essential hypertension  - Hypertensive on admit,    - Goal SBP <200  - norvasc 5 po daily  - hydralazine 25 q 8 hours prn SBP > 180        ESRD (end stage renal disease) on dialysis  - HD fistula in LUE  - Nephrology following   - Renally dose medications  - Strict I/Os   - HD 12/16, scheduled MWF      Hypoglycemia, coma  - Present on admission  - OSH attempted to shift HyperK with insulin    - On arrival, pt with glucose <5  - D10 drip gtt stopped (12/12); resume as indicated  - on 12/12 Glucose (248)  - POCT glucose q4 hour and SSI   - Monitor CMP     Type 2 diabetes mellitus  - Home glipizide held  - on admit glucose <5, started on D10 drip d/t hypoglycemia   - D10 gtt stopped  (12/12)   - (12/13) Glucose 131  - on SSI, POCT glucose q4hrs        Diet: Diet Dysphagia Pureed (IDDSI Level 4)   DVT PPx:   VTE Risk Mitigation (From admission, onward)         Ordered     heparin (porcine) injection 5,000 Units  Every 8 hours      12/12/19 0949     IP VTE LOW RISK PATIENT  Once      12/11/19 1520     Place sequential compression device  Until discontinued      12/11/19 1520                Does patient have Capacity?  No,   Goals- resume HD, pt repeated that he wants to die to nurses on 12/16 - new per sister; no answering today  Code Status- FULL  Pain management- well controlled  Prognosis-  fair  Family discussions- with sister who has not been familiar with his medical history recently but knew about trauma with brain injury in [place]; he is appropriate when they converse by phone.  Functional Status - need PT/OT .  Inpatient rehab recommended.    Discharge: Inpatient rehab    Milana Xavier MD  Hospital Medicine [facility]  [phone]

## 2019-12-17 NOTE — PLAN OF CARE
Problem: Oral Intake Inadequate  Goal: Improved Oral Intake  Outcome: Ongoing, Progressing  Intervention: Promote and Optimize Oral Intake  Flowsheets (Taken 12/17/2019 0826)  Oral Nutrition Promotion: calorie dense foods provided; calorie dense liquids provided     Problem: Nutrition Impairment (Mechanical Ventilation, Invasive)  Goal: Optimal Nutrition Delivery  Outcome: Met  Intervention: Optimize Nutrition Delivery  Flowsheets (Taken 12/17/2019 0826)  Nutrition Support Management: other (see comments)   Recommendations     1.) Continue textured modified diet per SLP recommendations; continue Novasource Renal (PO).   2.) If PO intakes continue <50% of meals, suggest NGT placement with TF initiation: Novasource Renal @ 45mL/hr to provide 2160 kcals, 98gm protein and 774mL of free water.               MD to manage fluid.               If GRV >500mL, hold TF q4h then restart regimen.               TF to meet 106% EEN and 105% EPN.   3.) Suggest MVI.   4.) Suggest lactobacillus acidophilus.   5.) Daily weights     Goals: 1.) Pt to consume/tolerate >50% of meals by follow up. 2.) Pt to achieve/tolerate >75% EEN and EPN by follow up.   Nutrition Goal Status: progressing towards goal  Communication of RD Recs: reviewed with RN

## 2019-12-17 NOTE — HPI
Balta Lamb is a 49-year-old male with PMHx of HTN, HLD, DMII, TBI s/p L craniotomy, and ESRD on HD.  Patient presented to [facility] for AMS and left-sided numbness.  On arrival, patient with extreme agitation and combativeness.  Found to have hyperkalemia and metabolic acidosis.  CTH unremarkable.  Attempted shifting hyperkalemia with insulin.  Intubated, sedated, and transferred to [facility] on 12/11/19 for further evaluation and management.  Upon admission, found to be hypoglycemic and started on D10 infusion.  CTA multiphase impression without acute hemorrhage, major vascular distribution infarct, or high-grade stenosis/major vessel occlusion.  Admitted to [facility] with acute metabolic encephalopathy 2/2 hypoglycemia.  Improved and stepped down to floor on 12/15/19.      Functional History: Patient lives in [place] with his wife in a mobile home with 3 steps to enter.  Prior to admission, he was (I) with ADLs and mobility.  Per patient, TBI in 2011, and he did not require post-acute care or therapy at that time.  DME: none.

## 2019-12-17 NOTE — PROGRESS NOTES
"[facility]  Adult Nutrition  Progress Note    SUMMARY       Recommendations    1.) Continue textured modified diet per SLP recommendations; continue Novasource Renal (PO).   2.) If PO intakes continue <50% of meals, suggest NGT placement with TF initiation: Novasource Renal @ 45mL/hr to provide 2160 kcals, 98gm protein and 774mL of free water.    MD to manage fluid.    If GRV >500mL, hold TF q4h then restart regimen.    TF to meet 106% EEN and 105% EPN.   3.) Suggest MVI.   4.) Suggest lactobacillus acidophilus.   5.) Daily weights    Goals: 1.) Pt to consume/tolerate >50% of meals by follow up. 2.) Pt to achieve/tolerate >75% EEN and EPN by follow up.   Nutrition Goal Status: progressing towards goal  Communication of RD Recs: reviewed with RN    Reason for Assessment    Reason For Assessment: RD follow-up  Diagnosis: stroke/CVA  Relevant Medical History: HTN, DM2, ESRD  Interdisciplinary Rounds: did not attend  General Information Comments: Pt sleeping after eventful night, family to bedside. Family reports pt with very poor PO intake of meals.  Diet advanced 12/16 per SLP. Pt only consuming a bite of each tray and not consuming ONS provided to bedside. TF discontinued. Family states pt with loose stools, but deny diarrhea. They also state pt with dry heaving 12/16 after attempting to take medication. Encouraged bites/sips of meals/liquids throughout the day. Family agreed. NFPE completed 12/12 with mild wasting observed. Wt stable since last RD visit. Last HD 12/16 with 3L removed. At this time, pt does not meet malnutrition criteria.   Nutrition Discharge Planning: Adequate intake to meet nutritional needs.     Nutrition Risk Screen    Nutrition Risk Screen: tube feeding or parenteral nutrition    Nutrition/Diet History    Spiritual, Cultural Beliefs, Hinduism Practices, Values that Affect Care: no    Anthropometrics    Temp: 98.1 °F (36.7 °C)  Height: 5' 10" (177.8 cm)  Height (inches): 70 " in  Weight Method: Bed Scale  Weight: 77.6 kg (171 lb)  Weight (lb): 171 lb  Ideal Body Weight (IBW), Male: 166 lb  % Ideal Body Weight, Male (lb): 103.01 lb  BMI (Calculated): 24.5  BMI Grade: 18.5-24.9 - normal       Lab/Procedures/Meds    Pertinent Labs Reviewed: reviewed  Pertinent Labs Comments: H/H 7.3/23.8, BUN 57, Cr 9.4, GFR 6.7, Ca 7.9, Phos 7.3, HbA1C 7.5  Pertinent Medications Reviewed: reviewed  Pertinent Medications Comments: statin, miralax, senna-docusate, vancomycin    Estimated/Assessed Needs    Weight Used For Calorie Calculations: 77.6 kg (171 lb 1.2 oz)  Energy Calorie Requirements (kcal): 2046  Energy Need Method: McDonough-St Jeor(x 1.25 PAL)  Protein Requirements: 78-93(g/day)  Weight Used For Protein Calculations: 77.6 kg (171 lb 1.2 oz)(1.0-1.2 g/kg)     Estimated Fluid Requirement Method: RDA Method(or per MD)  RDA Method (mL): 2046  CHO Requirement: 256gm/day      Nutrition Prescription Ordered    Current Diet Order: puree  Nutrition Order Comments: -  Current Nutrition Support Formula Ordered: Other (Comment)(discontinued)  Oral Nutrition Supplement: Novasource Renal     Evaluation of Received Nutrient/Fluid Intake    I/O: -0.27L since admit  Energy Calories Required: not meeting needs  Protein Required: not meeting needs  Fluid Required: not meeting needs  Comments: LBM 12/16  % Intake of Estimated Energy Needs: 0 - 25 %  % Meal Intake: 0 - 25 %    Nutrition Risk    Level of Risk/Frequency of Follow-up: moderate     Assessment and Plan    Nutrition Problem  Inadequate energy intake      Related to (etiology):   Poor appetite     Signs and Symptoms (as evidenced by):   Pt consuming <75% of nutritional needs     Interventions/Recommendations (treatment strategy):  Collaboration of nutrition care with other providers  Modified diet-puree  Supplemental beverage-Novasource Renal  Vitamins-MVI  Medications-lactobacillus     Nutrition Diagnosis Status:   Revised 12/17/19; continues       Monitor  and Evaluation    Food and Nutrient Intake: energy intake, food and beverage intake, enteral nutrition intake  Food and Nutrient Adminstration: diet order, enteral and parenteral nutrition administration  Knowledge/Beliefs/Attitudes: food and nutrition knowledge/skill  Physical Activity and Function: nutrition-related ADLs and IADLs  Anthropometric Measurements: weight, weight change, body mass index  Biochemical Data, Medical Tests and Procedures: electrolyte and renal panel, gastrointestinal profile, glucose/endocrine profile  Nutrition-Focused Physical Findings: overall appearance     Malnutrition Assessment                 Upper Arm Region (Subcutaneous Fat Loss): (VINEET due to swelling)   [place] Region (Muscle Loss): well nourished  Clavicle Bone Region (Muscle Loss): well nourished  Clavicle and Acromion Bone Region (Muscle Loss): well nourished  Scapular Bone Region (Muscle Loss): well nourished  Dorsal Hand (Muscle Loss): (VINEET due to swelling)  Patellar Region (Muscle Loss): mild depletion  Posterior Calf Region (Muscle Loss): mild depletion                 Nutrition Follow-Up    RD Follow-up?: Yes

## 2019-12-17 NOTE — PLAN OF CARE
Problem: Physical Therapy Goal  Goal: Physical Therapy Goal  Description  Goals to be met by: 2020     Patient will increase functional independence with mobility by performin. Supine to sit with Set-up [place]  2. Sit to supine with Set-up [place]  3. Sit to stand transfer with Contact Guard Assistance  4. Bed to chair transfer with Contact Guard Assistance using Rolling Walker  5. Gait  x 100 feet with Contact Guard Assistance using Rolling Walker.   6. Ascend/descend 3 stair with right Handrails Contact Guard Assistance   Outcome: Ongoing, Progressing   Eval completed and POC established    Ad Gallardo, PT,DPT  2019

## 2019-12-17 NOTE — CONSULTS
[facility]  Physical Medicine & Rehab  Consult Note    Patient Name: Balta Lamb  MRN: [id number]  Admission Date: 12/11/2019  Hospital Length of Stay: 6 days  Attending Physician: Milana Xavier MD     Inpatient consult to Physical Medicine & Rehabilitation  Consult performed by: Latrice Adams NP  Consult requested by:  Milana Xaveir MD    Collaborating Physician: Hui Bass MD  Reason for Consult:  assess rehabilitation needs    Consults  Subjective:     Principal Problem: Altered mental status    HPI: Balta Lamb is a 49-year-old male with PMHx of HTN, HLD, DMII, TBI s/p L craniotomy, and ESRD on HD.  Patient presented to [facility] for AMS and left-sided numbness.  On arrival, patient with extreme agitation and combativeness.  Found to have hyperkalemia and metabolic acidosis.  CTH unremarkable.  Attempted shifting hyperkalemia with insulin.  Intubated, sedated, and transferred to [facility] on 12/11/19 for further evaluation and management.  Upon admission, found to be hypoglycemic and started on D10 infusion.  CTA multiphase impression without acute hemorrhage, major vascular distribution infarct, or high-grade stenosis/major vessel occlusion.  Admitted to [facility] with acute metabolic encephalopathy 2/2 hypoglycemia.  Improved and stepped down to floor on 12/15/19.      Functional History: Patient lives in [place] with his wife in a mobile home with 3 steps to enter.  Prior to admission, he was (I) with ADLs and mobility.  Per patient, TBI in 2011, and he did not require post-acute care or therapy at that time.  DME: none.    Hospital Course:   12/12/19:  Evaluated by OT.  Session limited 2/2 intubated/acuity.  Bed mobility and ADLs totalA.    12/15/19:  Participated with OT.  Evaluated by SLP.  SLP recommendation: NPO for dysphagia.  Bed mobility Bill.  Sit to stand Bill and transfers modA.  Grooming modA.  UBD and LBD maxA.  12/16/19:  Participated with SLP.  Upgraded  recommendation: puree diet and thin liquids.  PT evaluation pending.    History reviewed. No pertinent past medical history.  History reviewed. No pertinent surgical history.  Review of patient's allergies indicates:  No Known Allergies    Scheduled Medications:    amLODIPine  5 mg Oral Once    atorvastatin  40 mg Per OG tube Daily    cloNIDine  0.1 mg Oral BID    heparin (porcine)  5,000 Units Subcutaneous Q8H    hydrALAZINE  50 mg Oral Q8H    lorazepam        NIFEdipine  30 mg Oral Daily    polyethylene glycol  17 g Per NG tube Daily    QUEtiapine  50 mg Per OG tube BID    senna-docusate 8.6-50 mg  1 tablet Per OG tube BID    sevelamer carbonate  0.8 g Oral TID WM       PRN Medications: acetaminophen, albuterol sulfate, calcium gluconate IVPB, calcium gluconate IVPB, calcium gluconate IVPB, Dextrose 10% Bolus, Dextrose 10% Bolus, dextrose 50%, glucagon (human recombinant), glucose, glucose, haloperidol lactate, hydrALAZINE, hydrALAZINE, insulin aspart U-100, melatonin, senna-docusate 8.6-50 mg, sodium chloride 0.9%, Pharmacy to dose Vancomycin consult **AND** vancomycin - pharmacy to dose    Family History     None        Tobacco Use    Smoking status: Unknown If Ever Smoked   Substance and Sexual Activity    Alcohol use: Not on file    Drug use: Not on file    Sexual activity: Not on file     Review of Systems   Constitutional: Positive for activity change and fatigue. Negative for chills.   HENT: Negative for drooling, hearing loss, trouble swallowing and voice change.    Eyes: Positive for visual disturbance. Negative for pain.   Respiratory: Negative for cough and shortness of breath.    Cardiovascular: Negative for chest pain and palpitations.   Gastrointestinal: Negative for abdominal pain, nausea and vomiting.   Genitourinary: Positive for decreased urine volume. Negative for flank pain.   Musculoskeletal: Positive for gait problem. Negative for arthralgias and myalgias.   Skin: Negative for  rash and wound.   Neurological: Positive for weakness. Negative for dizziness, numbness and headaches.   Psychiatric/Behavioral: Negative for agitation. The patient is not nervous/anxious.      Objective:     Vital Signs (Most Recent):  Temp: 97.9 °F (36.6 °C) (12/17/19 0826)  Pulse: 93 (12/17/19 0826)  Resp: 14 (12/17/19 0826)  BP: (!) 185/77 (12/17/19 0826)  SpO2: (!) 93 % (12/17/19 0826)    Vital Signs (24h Range):  Temp:  [97.9 °F (36.6 °C)-98.1 °F (36.7 °C)] 97.9 °F (36.6 °C)  Pulse:  [] 93  Resp:  [14-18] 14  SpO2:  [93 %-94 %] 93 %  BP: (171-224)/() 185/77     Body mass index is 24.54 kg/m².    Physical Exam   Constitutional: He is oriented to person, place, and time. He appears well-developed and well-nourished. He is sleeping and cooperative. He is easily aroused. No distress.   HENT:   Head: Normocephalic and atraumatic.   Right Ear: External ear normal.   Left Ear: External ear normal.   Nose: Nose normal.   Eyes: Right eye exhibits no discharge. Left eye exhibits no discharge. No scleral icterus.   Neck: Normal range of motion.   Cardiovascular: Normal rate and intact distal pulses.   Pulmonary/Chest: Effort normal. No respiratory distress.   Abdominal: Soft. He exhibits no distension. There is no tenderness.   Musculoskeletal: He exhibits no edema or tenderness.   Neurological: He is oriented to person, place, and time and easily aroused. No sensory deficit. He exhibits normal muscle tone.   Sleepy.  Oriented x 3.  No focal weakness.  No aphasia or dysarthria.    Skin: Skin is warm and dry. No rash noted.   Psychiatric: He has a normal mood and affect. His behavior is normal.   Camera sitter at bedside   Vitals reviewed.    Diagnostic Results:   Labs: Reviewed  X-Ray: Reviewed  CT: Reviewed    Assessment/Plan:     * Altered mental status  -  Acute metabolic encephalopathy 2/2 hypoglycemia   -  CTA multiphase impression without acute hemorrhage, major vascular distribution infarct, or  high-grade stenosis/major vessel occlusion  -  Camera sitter at bedside  -  Management per primary team    Hypoglycemia, coma  -  Hyperkalemia at [facility] with attempted shift with insulin  -  Hypoglycemic on arrival and started on D10 infusion  -  Acute metabolic encephalopathy 2/2 hypoglycemia  -  Management per primary team    ESRD (end stage renal disease) on dialysis  -  On HD  -  Nephrology following    Impaired mobility and ADLs  -  (I) with ADLs and mobility at baseline  -  Related to acute hospitalization, weakness, impaired self care skills, impaired balance, decreased coordination, decreased safety awareness, decreased ROM, impaired coordination, decreased upper extremity function, impaired functional mobilty, impaired endurance, impaired sensation, gait instability, impaired cognition, decreased lower extremity function, impaired fine motor  See hospital course for functional status.    Recommendations  -  Encourage mobility, OOB in chair, and early ambulation as appropriate  -  PT/OT evaluate and treat  -  Pain management  -  DVT prophylaxis if appropriate   -  Monitor for and prevent skin breakdown and pressure ulcers  · Early mobility, repositioning/weight shifting every 20-30 minutes when sitting, turn patient every 2 hours, proper mattress/overlay and chair cushioning, pressure relief/heel protector boots    Post-acute recommendation: pending therapy progress/tolerance and medical stability- PT evaluation pending, with goals for inpatient rehab     Thank you for your consult.     VIMAL Haney  Department of Physical Medicine & Rehab  [facility]

## 2019-12-17 NOTE — PROGRESS NOTES
HD Tx complete. 3L removed during 3.5Hr Tx. Tolerated well, hypertensive throughout Tx. Bp 173/78 at present. Blood returned via LUIGI AVF. 15g needles removed x2. Gauze and tape applied. Pressure held for 10mins. Hemostasis achieved.    Dr Talley aware of hypertension and present bp.

## 2019-12-17 NOTE — SUBJECTIVE & OBJECTIVE
History reviewed. No pertinent past medical history.  History reviewed. No pertinent surgical history.  Review of patient's allergies indicates:  No Known Allergies    Scheduled Medications:    amLODIPine  5 mg Oral Once    atorvastatin  40 mg Per OG tube Daily    cloNIDine  0.1 mg Oral BID    heparin (porcine)  5,000 Units Subcutaneous Q8H    hydrALAZINE  50 mg Oral Q8H    lorazepam        NIFEdipine  30 mg Oral Daily    polyethylene glycol  17 g Per NG tube Daily    QUEtiapine  50 mg Per OG tube BID    senna-docusate 8.6-50 mg  1 tablet Per OG tube BID    sevelamer carbonate  0.8 g Oral TID WM       PRN Medications: acetaminophen, albuterol sulfate, calcium gluconate IVPB, calcium gluconate IVPB, calcium gluconate IVPB, Dextrose 10% Bolus, Dextrose 10% Bolus, dextrose 50%, glucagon (human recombinant), glucose, glucose, haloperidol lactate, hydrALAZINE, hydrALAZINE, insulin aspart U-100, melatonin, senna-docusate 8.6-50 mg, sodium chloride 0.9%, Pharmacy to dose Vancomycin consult **AND** vancomycin - pharmacy to dose    Family History     None        Tobacco Use    Smoking status: Unknown If Ever Smoked   Substance and Sexual Activity    Alcohol use: Not on file    Drug use: Not on file    Sexual activity: Not on file     Review of Systems   Constitutional: Positive for activity change and fatigue. Negative for chills.   HENT: Negative for drooling, hearing loss, trouble swallowing and voice change.    Eyes: Positive for visual disturbance. Negative for pain.   Respiratory: Negative for cough and shortness of breath.    Cardiovascular: Negative for chest pain and palpitations.   Gastrointestinal: Negative for abdominal pain, nausea and vomiting.   Genitourinary: Positive for decreased urine volume. Negative for flank pain.   Musculoskeletal: Positive for gait problem. Negative for arthralgias and myalgias.   Skin: Negative for rash and wound.   Neurological: Positive for weakness. Negative for  dizziness, numbness and headaches.   Psychiatric/Behavioral: Negative for agitation. The patient is not nervous/anxious.      Objective:     Vital Signs (Most Recent):  Temp: 97.9 °F (36.6 °C) (12/17/19 0826)  Pulse: 93 (12/17/19 0826)  Resp: 14 (12/17/19 0826)  BP: (!) 185/77 (12/17/19 0826)  SpO2: (!) 93 % (12/17/19 0826)    Vital Signs (24h Range):  Temp:  [97.9 °F (36.6 °C)-98.1 °F (36.7 °C)] 97.9 °F (36.6 °C)  Pulse:  [] 93  Resp:  [14-18] 14  SpO2:  [93 %-94 %] 93 %  BP: (171-224)/() 185/77     Body mass index is 24.54 kg/m².    Physical Exam   Constitutional: He is oriented to person, place, and time. He appears well-developed and well-nourished. He is sleeping and cooperative. He is easily aroused. No distress.   HENT:   Head: Normocephalic and atraumatic.   Right Ear: External ear normal.   Left Ear: External ear normal.   Nose: Nose normal.   Eyes: Right eye exhibits no discharge. Left eye exhibits no discharge. No scleral icterus.   Neck: Normal range of motion.   Cardiovascular: Normal rate and intact distal pulses.   Pulmonary/Chest: Effort normal. No respiratory distress.   Abdominal: Soft. He exhibits no distension. There is no tenderness.   Musculoskeletal: He exhibits no edema or tenderness.   Neurological: He is oriented to person, place, and time and easily aroused. No sensory deficit. He exhibits normal muscle tone.   Sleepy.  Oriented x 3.  No focal weakness.  No aphasia or dysarthria.    Skin: Skin is warm and dry. No rash noted.   Psychiatric: He has a normal mood and affect. His behavior is normal.   Camera sitter at bedside   Vitals reviewed.    NEUROLOGICAL EXAMINATION:     MENTAL STATUS   Oriented to person, place, and time.       Diagnostic Results:   Labs: Reviewed  X-Ray: Reviewed  CT: Reviewed

## 2019-12-17 NOTE — PT/OT/SLP PROGRESS
Occupational Therapy   Treatment    Name: Balta Lamb  MRN: [id number]  Admitting Diagnosis:  Altered mental status       Recommendations:     Discharge Recommendations: rehabilitation facility  Discharge Equipment Recommendations:  bath bench  Barriers to discharge:  Inaccessible home environment, Decreased caregiver support    Assessment:     Balta Lamb is a 49 y.o. male with a medical diagnosis of Altered mental status.  He presents with impaired ADL and functional mobility deficits. Pt lethargic however willing to participate in bed mobility, EOB ADLs, sit<stand t/f trials, and visual testing. Pt SBA with bed mobility and min (A) with sit>stand t/f using no AD. Pt mod (A) to take several steps towards HOB with noted impairments in balance and ability for foot clearance. Pt with difficulty following commands and with noted L eye deviation making mobility increasingly challenging at this time. Performance deficits affecting function are weakness, impaired endurance, impaired self care skills, impaired functional mobilty, gait instability, impaired balance, impaired cognition, decreased safety awareness, decreased lower extremity function, decreased upper extremity function, decreased coordination, impaired cardiopulmonary response to activity, impaired fine motor, impaired skin. Pt would benefit from continued OT skilled services 4x/wk to improve daily living skills to optimize QOL. Pt is recommended to discharge to rehab at this time.      Rehab Prognosis:  Good; patient would benefit from acute skilled OT services to address these deficits and reach maximum level of function.       Plan:     Patient to be seen 4 x/week to address the above listed problems via self-care/home management, therapeutic activities, therapeutic exercises, neuromuscular re-education, sensory integration  · Plan of Care Expires: 01/09/20  · Plan of Care Reviewed with: patient, sibling    Subjective     Pain/Comfort:  · Pain Rating  1: 0/10    Objective:     Communicated with: BONNIE Ruth  prior to session.  Patient found HOB elevated with bed alarm, restraints, peripheral IV upon OT entry to room.    General Precautions: Standard, fall   Orthopedic Precautions:N/A   Braces: N/A     Occupational Performance:     Bed Mobility:    · Patient completed Rolling/Turning to Right with stand by assistance  · Patient completed Scooting/Bridging with stand by assistance  · Patient completed Supine to Sit with stand by assistance  · Patient completed Sit to Supine with stand by assistance     Functional Mobility/Transfers:  · Patient completed Sit <> Stand Transfer with minimum assistance  with  hand-held assist   · Functional Mobility: Pt completed stand with min (A) taking several steps towards HOB with mod (A). Pt required verbal cues for mobility.     Activities of Daily Living:  · Grooming: contact guard assistance as pt with IV's in R hand making grooming face difficult at EOB. Difficulty primarily noted in FM ability.  · Lower Body Dressing: contact guard assistance donning socks at EOB       [facility] 6 Click ADL: 12    Treatment & Education:  Pt educated on role of occupational therapy, POC, and safety during ADLs and functional mobility. Pt and OT discussed importance of safe, continued mobility to optimize daily living skills. Pt verbalized understanding.   Pt completed the following during session: bed mobility, LB dressing, grooming tasks, x2 sit<stand trials. See above for associated mobility performance. White board updated during session. Pt given instruction to call for medical staff/nurse for assistance.       Patient left HOB elevated with all lines intact, call button in reach, bed alarm on, restraints reapplied at end of session, BONNIE Ruth notified and pt's family presentEducation:      GOALS:   Multidisciplinary Problems     Occupational Therapy Goals        Problem: Occupational Therapy Goal    Goal Priority Disciplines Outcome  Interventions   Occupational Therapy Goal     OT, PT/OT Ongoing, Progressing    Description:  Goals set 12/15 to be addressed for 14 days with expiration date, 12/29:  Patient will increase functional independence with ADLs by performing:    Patient will demonstrate rolling to the right with supervision.     Patient will demonstrate rolling to the left with supervision.  Patient will demonstrate supine -sit with supervision.     Patient will demonstrate stand pivot transfers with min assist.     Patient will demonstrate grooming while seated with setup (A).  Patient will demonstrate upper body dressing with min assist while seated EOB.   Progressing 12/17  Patient will demonstrate lower body dressing with min assist while seated EOB.     Patient will demonstrate toileting with min assist.     Patient will demonstrate bathing while seated EOB with min assist.     Patient's family / caregiver will demonstrate independence and safety with assisting patient with self-care skills and functional mobility.                               Time Tracking:     OT Date of Treatment: 12/17/19  OT Start Time: 0919  OT Stop Time: 0936  OT Total Time (min): 17 min    Billable Minutes:Self Care/Home Management 17 min    Freya Gifford OT  12/17/2019

## 2019-12-17 NOTE — ASSESSMENT & PLAN NOTE
-  Hyperkalemia at [facility] with attempted shift with insulin  -  Hypoglycemic on arrival and started on D10 infusion  -  Acute metabolic encephalopathy 2/2 hypoglycemia  -  Management per primary team

## 2019-12-17 NOTE — ASSESSMENT & PLAN NOTE
-  (I) with ADLs and mobility at baseline  -  Related to acute hospitalization, weakness, impaired self care skills, impaired balance, decreased coordination, decreased safety awareness, decreased ROM, impaired coordination, decreased upper extremity function, impaired functional mobilty, impaired endurance, impaired sensation, gait instability, impaired cognition, decreased lower extremity function, impaired fine motor  See hospital course for functional status.    Recommendations  -  Encourage mobility, OOB in chair, and early ambulation as appropriate  -  PT/OT evaluate and treat  -  Pain management  -  DVT prophylaxis if appropriate   -  Monitor for and prevent skin breakdown and pressure ulcers  · Early mobility, repositioning/weight shifting every 20-30 minutes when sitting, turn patient every 2 hours, proper mattress/overlay and chair cushioning, pressure relief/heel protector boots

## 2019-12-17 NOTE — PLAN OF CARE
Patient free from falls and injury throughout shift. Patient combative and disoriented; soft wrist restraints initiated at 0015. Ativan and haldol IVP administered. BP elevated. IVP hydralazine administered. Patient  complains of muscle spasms and is restless. Plan of care reviewed with patient and family. Family verbalizes understanding. Will continue to monitor.

## 2019-12-17 NOTE — CARE UPDATE
Rapid Response Nurse Chart Check     Chart check completed, abnormal VS noted. Bedside RNThania contacted. Reported pt agitated and SBP in 200's. Ativan and Haldol given repeat SBP remaining > 180, IV hydralazine ordered. No other concerns verbalized at this time, instructed to call [phone] for further concerns or assistance. Will f/u..

## 2019-12-17 NOTE — PROGRESS NOTES
Patient following commands and answering questions appropriately.  States he will not pull out IV's or attempt to get out of bed without assistance.  Sisters at bedside and well as [employer] camera. Restraints discontinued.  Will continue to monitor.

## 2019-12-17 NOTE — CARE UPDATE
Rapid Response Nurse Follow-up Note     Followed up with patient for proactive rounding. No acute issues at this time. Reviewed plan of care with primary RN, Thania. Additional dose of IV hydralazine given. Repeat VS improved w/ SBP<180 (refer to flowsheet). Please call Rapid Response RN, Susan Chery RN with any questions or concerns at [phone].

## 2019-12-17 NOTE — PROGRESS NOTES
[facility]      Report given to Alex Sparks

## 2019-12-17 NOTE — HOSPITAL COURSE
12/12/19:  Evaluated by OT.  Session limited 2/2 intubated/acuity.  Bed mobility and ADLs totalA.    12/15/19:  Participated with OT.  Evaluated by SLP.  SLP recommendation: NPO for dysphagia.  Bed mobility Bill.  Sit to stand Bill and transfers modA.  Grooming modA.  UBD and LBD maxA.  12/16/19:  Participated with SLP.  Upgraded recommendation: puree diet and thin liquids.  PT evaluation pending.  12/17/19:  Evaluated by PT.  Participated with OT.  Bed mobility SBA.  Sit to stand Bill.  Ambulated 3 steps modA.  Grooming CGA and LBD CGA.  12/18/19:  Participated with OT and SLP.  SLP upgraded recommendation: regular diet and thin liquids.  Bed mobility SBA-CGA.  Sit to stand Bill.  Ambulated ~20 ft modA.  Grooming CGA, UBD Bill, and LBD CGA.  12/19/19:  Participated with PT.  Bed mobility SV.  Sit to stand CGA with RW.  Ambulated 10 ft modA + 24 ft x 2 Bill with RW.

## 2019-12-17 NOTE — PROGRESS NOTES
Spoke with Dr. Huitron regarding patient's restlessness and lack of IV access. Patient previously pulled out his midline. Dr. Huitron will place orders for bilateral upper extremity nonviolent restraints as well as IV haldol. Will implement and continue to monitor.

## 2019-12-18 LAB
ALBUMIN SERPL BCP-MCNC: 2.8 G/DL (ref 3.5–5.2)
ALP SERPL-CCNC: 84 U/L (ref 55–135)
ALT SERPL W/O P-5'-P-CCNC: 36 U/L (ref 10–44)
ANION GAP SERPL CALC-SCNC: 15 MMOL/L (ref 8–16)
ANISOCYTOSIS BLD QL SMEAR: SLIGHT
ANISOCYTOSIS BLD QL SMEAR: SLIGHT
AST SERPL-CCNC: 37 U/L (ref 10–40)
BASOPHILS # BLD AUTO: 0.02 K/UL (ref 0–0.2)
BASOPHILS # BLD AUTO: 0.03 K/UL (ref 0–0.2)
BASOPHILS NFR BLD: 0.4 % (ref 0–1.9)
BASOPHILS NFR BLD: 0.6 % (ref 0–1.9)
BILIRUB SERPL-MCNC: 0.4 MG/DL (ref 0.1–1)
BUN SERPL-MCNC: 32 MG/DL (ref 6–20)
CALCIUM SERPL-MCNC: 8 MG/DL (ref 8.7–10.5)
CHLORIDE SERPL-SCNC: 104 MMOL/L (ref 95–110)
CO2 SERPL-SCNC: 24 MMOL/L (ref 23–29)
CREAT SERPL-MCNC: 7.9 MG/DL (ref 0.5–1.4)
DIFFERENTIAL METHOD: ABNORMAL
DIFFERENTIAL METHOD: ABNORMAL
EOSINOPHIL # BLD AUTO: 0.2 K/UL (ref 0–0.5)
EOSINOPHIL # BLD AUTO: 0.2 K/UL (ref 0–0.5)
EOSINOPHIL NFR BLD: 3 % (ref 0–8)
EOSINOPHIL NFR BLD: 3.1 % (ref 0–8)
ERYTHROCYTE [DISTWIDTH] IN BLOOD BY AUTOMATED COUNT: 13.3 % (ref 11.5–14.5)
ERYTHROCYTE [DISTWIDTH] IN BLOOD BY AUTOMATED COUNT: 13.5 % (ref 11.5–14.5)
EST. GFR  (AFRICAN AMERICAN): 8.2 ML/MIN/1.73 M^2
EST. GFR  (NON AFRICAN AMERICAN): 7.1 ML/MIN/1.73 M^2
GLUCOSE SERPL-MCNC: 135 MG/DL (ref 70–110)
HCT VFR BLD AUTO: 24.6 % (ref 40–54)
HCT VFR BLD AUTO: 24.7 % (ref 40–54)
HGB BLD-MCNC: 8.1 G/DL (ref 14–18)
HGB BLD-MCNC: 8.1 G/DL (ref 14–18)
HYPOCHROMIA BLD QL SMEAR: ABNORMAL
IMM GRANULOCYTES # BLD AUTO: 0.03 K/UL (ref 0–0.04)
IMM GRANULOCYTES # BLD AUTO: 0.03 K/UL (ref 0–0.04)
IMM GRANULOCYTES NFR BLD AUTO: 0.6 % (ref 0–0.5)
IMM GRANULOCYTES NFR BLD AUTO: 0.6 % (ref 0–0.5)
LYMPHOCYTES # BLD AUTO: 1 K/UL (ref 1–4.8)
LYMPHOCYTES # BLD AUTO: 1 K/UL (ref 1–4.8)
LYMPHOCYTES NFR BLD: 18.9 % (ref 18–48)
LYMPHOCYTES NFR BLD: 19.2 % (ref 18–48)
MAGNESIUM SERPL-MCNC: 2.3 MG/DL (ref 1.6–2.6)
MCH RBC QN AUTO: 34 PG (ref 27–31)
MCH RBC QN AUTO: 34.3 PG (ref 27–31)
MCHC RBC AUTO-ENTMCNC: 32.8 G/DL (ref 32–36)
MCHC RBC AUTO-ENTMCNC: 32.9 G/DL (ref 32–36)
MCV RBC AUTO: 104 FL (ref 82–98)
MCV RBC AUTO: 104 FL (ref 82–98)
MONOCYTES # BLD AUTO: 0.6 K/UL (ref 0.3–1)
MONOCYTES # BLD AUTO: 0.6 K/UL (ref 0.3–1)
MONOCYTES NFR BLD: 11.1 % (ref 4–15)
MONOCYTES NFR BLD: 11.7 % (ref 4–15)
NEUTROPHILS # BLD AUTO: 3.5 K/UL (ref 1.8–7.7)
NEUTROPHILS # BLD AUTO: 3.5 K/UL (ref 1.8–7.7)
NEUTROPHILS NFR BLD: 64.9 % (ref 38–73)
NEUTROPHILS NFR BLD: 65.9 % (ref 38–73)
NRBC BLD-RTO: 0 /100 WBC
NRBC BLD-RTO: 0 /100 WBC
OVALOCYTES BLD QL SMEAR: ABNORMAL
PHOSPHATE SERPL-MCNC: 5.3 MG/DL (ref 2.7–4.5)
PLATELET # BLD AUTO: 156 K/UL (ref 150–350)
PLATELET # BLD AUTO: 159 K/UL (ref 150–350)
PLATELET BLD QL SMEAR: ABNORMAL
PLATELET BLD QL SMEAR: ABNORMAL
PMV BLD AUTO: 10.5 FL (ref 9.2–12.9)
PMV BLD AUTO: 10.9 FL (ref 9.2–12.9)
POCT GLUCOSE: 112 MG/DL (ref 70–110)
POCT GLUCOSE: 119 MG/DL (ref 70–110)
POCT GLUCOSE: 127 MG/DL (ref 70–110)
POCT GLUCOSE: 213 MG/DL (ref 70–110)
POCT GLUCOSE: 291 MG/DL (ref 70–110)
POIKILOCYTOSIS BLD QL SMEAR: SLIGHT
POLYCHROMASIA BLD QL SMEAR: ABNORMAL
POTASSIUM SERPL-SCNC: 3.6 MMOL/L (ref 3.5–5.1)
PROT SERPL-MCNC: 5.7 G/DL (ref 6–8.4)
RBC # BLD AUTO: 2.36 M/UL (ref 4.6–6.2)
RBC # BLD AUTO: 2.38 M/UL (ref 4.6–6.2)
SODIUM SERPL-SCNC: 143 MMOL/L (ref 136–145)
VANCOMYCIN SERPL-MCNC: 18.3 UG/ML
WBC # BLD AUTO: 5.24 K/UL (ref 3.9–12.7)
WBC # BLD AUTO: 5.37 K/UL (ref 3.9–12.7)

## 2019-12-18 PROCEDURE — 93005 ELECTROCARDIOGRAM TRACING: CPT

## 2019-12-18 PROCEDURE — 80053 COMPREHEN METABOLIC PANEL: CPT

## 2019-12-18 PROCEDURE — 25000003 PHARM REV CODE 250: Performed by: STUDENT IN AN ORGANIZED HEALTH CARE EDUCATION/TRAINING PROGRAM

## 2019-12-18 PROCEDURE — 63600175 PHARM REV CODE 636 W HCPCS: Performed by: INTERNAL MEDICINE

## 2019-12-18 PROCEDURE — 92526 ORAL FUNCTION THERAPY: CPT

## 2019-12-18 PROCEDURE — 25000003 PHARM REV CODE 250: Performed by: HOSPITALIST

## 2019-12-18 PROCEDURE — 90935 HEMODIALYSIS ONE EVALUATION: CPT | Mod: ,,, | Performed by: NURSE PRACTITIONER

## 2019-12-18 PROCEDURE — 83735 ASSAY OF MAGNESIUM: CPT

## 2019-12-18 PROCEDURE — 25000003 PHARM REV CODE 250: Performed by: NURSE PRACTITIONER

## 2019-12-18 PROCEDURE — 63600175 PHARM REV CODE 636 W HCPCS: Performed by: NURSE PRACTITIONER

## 2019-12-18 PROCEDURE — 99232 SBSQ HOSP IP/OBS MODERATE 35: CPT | Mod: GC,,, | Performed by: INTERNAL MEDICINE

## 2019-12-18 PROCEDURE — 63600175 PHARM REV CODE 636 W HCPCS: Performed by: PSYCHIATRY & NEUROLOGY

## 2019-12-18 PROCEDURE — 93010 EKG 12-LEAD: ICD-10-PCS | Mod: ,,, | Performed by: INTERNAL MEDICINE

## 2019-12-18 PROCEDURE — 99232 PR SUBSEQUENT HOSPITAL CARE,LEVL II: ICD-10-PCS | Mod: GC,,, | Performed by: INTERNAL MEDICINE

## 2019-12-18 PROCEDURE — 84100 ASSAY OF PHOSPHORUS: CPT

## 2019-12-18 PROCEDURE — 80202 ASSAY OF VANCOMYCIN: CPT

## 2019-12-18 PROCEDURE — 99232 PR SUBSEQUENT HOSPITAL CARE,LEVL II: ICD-10-PCS | Mod: ,,, | Performed by: NURSE PRACTITIONER

## 2019-12-18 PROCEDURE — 93010 ELECTROCARDIOGRAM REPORT: CPT | Mod: ,,, | Performed by: INTERNAL MEDICINE

## 2019-12-18 PROCEDURE — 11000001 HC ACUTE MED/SURG PRIVATE ROOM

## 2019-12-18 PROCEDURE — 97535 SELF CARE MNGMENT TRAINING: CPT

## 2019-12-18 PROCEDURE — 85025 COMPLETE CBC W/AUTO DIFF WBC: CPT

## 2019-12-18 PROCEDURE — 80100014 HC HEMODIALYSIS 1:1

## 2019-12-18 PROCEDURE — 97112 NEUROMUSCULAR REEDUCATION: CPT

## 2019-12-18 PROCEDURE — 25000003 PHARM REV CODE 250: Performed by: PSYCHIATRY & NEUROLOGY

## 2019-12-18 PROCEDURE — 25000003 PHARM REV CODE 250: Performed by: INTERNAL MEDICINE

## 2019-12-18 PROCEDURE — 97530 THERAPEUTIC ACTIVITIES: CPT

## 2019-12-18 PROCEDURE — 99232 SBSQ HOSP IP/OBS MODERATE 35: CPT | Mod: ,,, | Performed by: NURSE PRACTITIONER

## 2019-12-18 PROCEDURE — 90935 PR HEMODIALYSIS, ONE EVALUATION: ICD-10-PCS | Mod: ,,, | Performed by: NURSE PRACTITIONER

## 2019-12-18 RX ADMIN — NIFEDIPINE 60 MG: 60 TABLET, FILM COATED, EXTENDED RELEASE ORAL at 09:12

## 2019-12-18 RX ADMIN — VANCOMYCIN HYDROCHLORIDE 750 MG: 750 INJECTION, POWDER, LYOPHILIZED, FOR SOLUTION INTRAVENOUS at 09:12

## 2019-12-18 RX ADMIN — QUETIAPINE FUMARATE 50 MG: 25 TABLET ORAL at 09:12

## 2019-12-18 RX ADMIN — SEVELAMER CARBONATE 0.8 G: 800 POWDER, FOR SUSPENSION ORAL at 07:12

## 2019-12-18 RX ADMIN — SEVELAMER CARBONATE 0.8 G: 800 POWDER, FOR SUSPENSION ORAL at 09:12

## 2019-12-18 RX ADMIN — CLONIDINE HYDROCHLORIDE 0.1 MG: 0.1 TABLET ORAL at 09:12

## 2019-12-18 RX ADMIN — HYDRALAZINE HYDROCHLORIDE 50 MG: 50 TABLET, FILM COATED ORAL at 05:12

## 2019-12-18 RX ADMIN — HEPARIN SODIUM 5000 UNITS: 5000 INJECTION, SOLUTION INTRAVENOUS; SUBCUTANEOUS at 06:12

## 2019-12-18 RX ADMIN — INSULIN ASPART 3 UNITS: 100 INJECTION, SOLUTION INTRAVENOUS; SUBCUTANEOUS at 10:12

## 2019-12-18 RX ADMIN — SEVELAMER CARBONATE 0.8 G: 800 POWDER, FOR SUSPENSION ORAL at 12:12

## 2019-12-18 RX ADMIN — SODIUM CHLORIDE: 0.9 INJECTION, SOLUTION INTRAVENOUS at 03:12

## 2019-12-18 RX ADMIN — INSULIN ASPART 4 UNITS: 100 INJECTION, SOLUTION INTRAVENOUS; SUBCUTANEOUS at 12:12

## 2019-12-18 RX ADMIN — HEPARIN SODIUM 5000 UNITS: 5000 INJECTION, SOLUTION INTRAVENOUS; SUBCUTANEOUS at 01:12

## 2019-12-18 RX ADMIN — ATORVASTATIN CALCIUM 40 MG: 20 TABLET, FILM COATED ORAL at 09:12

## 2019-12-18 RX ADMIN — HYDRALAZINE HYDROCHLORIDE 50 MG: 50 TABLET, FILM COATED ORAL at 06:12

## 2019-12-18 RX ADMIN — HEPARIN SODIUM 5000 UNITS: 5000 INJECTION, SOLUTION INTRAVENOUS; SUBCUTANEOUS at 09:12

## 2019-12-18 NOTE — PT/OT/SLP PROGRESS
Speech Language Pathology Treatment    Patient Name:  Balta Lamb   MRN:  [id number]  Admitting Diagnosis: Altered mental status    Recommendations:                 General Recommendations:  follow up diet assessment x1-2  Diet recommendations:  Regular, Liquid Diet Level: Thin   Aspiration Precautions: 1 bite/sip at a time, Alternating bites/sips, Eliminate distractions, Feed only when awake/alert, Small bites/sips and Standard aspiration precautions   General Precautions: Standard, fall  Communication strategies:  none    Subjective     Patient awake and alert. Family members present. RN reported patient with increased KEVIN this service date.     Pain/Comfort:  · Pain Rating 1: 0/10    Objective:     Has the patient been evaluated by SLP for swallowing?   Yes  Keep patient NPO? No   Current Respiratory Status: room air      Patient currently on puree diet and thin liquids. Breakfast tray consumed ~25% remaining.  Family members reported patient with good tolerance of meals. Patient with overt dry coughing upon SLP entry to room, not in presence of PO trials. Patient tolerated thin liquids over 4oz, puree x5, and regular solid trials x4 with no overt signs of airway compromise. Patient appears appropriate for diet advancement to regular consistency solids and thin liquids at this time. Patient and family members pleased with recommendations. Intermittent coughing noted post trials however not suspected to be associated with possible airway compromise. Skilled education was provided to patient and family members re: diet recs, standard aspiration precautions of which to follow, and ongoing ST plan of care to include diet check x1-2.  All recs discussed with RN post session.     Assessment:     Balta Lamb is a 49 y.o. male with an SLP diagnosis of Dysphagia.      Goals:   Multidisciplinary Problems     SLP Goals        Problem: SLP Goal    Goal Priority Disciplines Outcome   SLP Goal     SLP Ongoing,  Progressing   Description:  Speech Language Pathology Goals  Goals expected to be met by 12/23  1. Pt will tolerate puree & thin liquids without s/s aspiration  2. Ongoing swallow assessment to determine safest & least restrictive PO consistencies    Speech Language Pathology Goals  Goals expected to be met by 12/22   1. Ongoing swallow assessment MET                       Plan:     · Patient to be seen:  4 x/week   · Plan of Care expires:  01/13/20  · Plan of Care reviewed with:  patient, family   · SLP Follow-Up:  Yes       Discharge recommendations:  rehabilitation facility   Barriers to Discharge:  None    Time Tracking:     SLP Treatment Date:   12/18/19  Speech Start Time:  1106  Speech Stop Time:  1122     Speech Total Time (min):  16 min    Billable Minutes: Treatment Swallowing Dysfunction 8 and Seld Care/Home Management Training 8    Emily Abadie, CCC-SLP  12/18/2019

## 2019-12-18 NOTE — PROGRESS NOTES
[facility] NEPHROLOGY HEMODIALYSIS NOTE    Balta Lamb is a 49 y.o. male currently on hemodialysis for removal of uremic toxins and volume.     Patient seen and evaluated on hemodialysis, tolerating treatment, see HD flowsheet for vitals and assessments.    No Hypotension, chest pain, shortness of breath, cramping, nausea or vomiting.      Labs have been reviewed and the dialysate bath has been adjusted.    Labs:      Recent Labs   Lab 12/16/19  0525 12/17/19  0507 12/18/19  0431    144 143   K 4.5 3.7 3.6    103 104   CO2 19* 24 24   BUN 57* 24* 32*   CREATININE 9.4* 5.8* 7.9*   CALCIUM 7.9* 8.6* 8.0*   PHOS 7.3* 4.2 5.3*       Recent Labs   Lab 12/16/19  0525 12/17/19  0507 12/18/19  0431   WBC 4.36 4.83 5.37  5.24   HGB 7.3* 8.6* 8.1*  8.1*   HCT 23.8* 25.8* 24.6*  24.7*   * 143* 159  156        Assessment/Plan    Ultrafiltration goal: 2 L as tolerated, keep MAP >65.  - Seen on dialysis this afternoon, tolerating session with current UFR, no complications.    - Patient more oriented today vs Monday, no complaints or distress noted.   - Will continue dialysis treatments while in-patient  - Continue to monitor intake and output, daily weights   - Avoid nephrotoxic medication and renal dose medications to GFR    Anemia of ESRD  - Saturated Iron: 90; Ferritin 2294.  - Will start Epogen if patient is not discharged today  - Hgb 8.1     BMM  - Renal diet with protein intake goal 1.5 g/kg/d  - Novasource with meals  - Phos 5.3, on binders   - Daily renal function panel     Riddhi Saenz, AYSHA, APRN, FNP-C  Nephrology Department  Pager:  [phone]

## 2019-12-18 NOTE — PLAN OF CARE
Problem: SLP Goal  Goal: SLP Goal  Description  Speech Language Pathology Goals  Goals expected to be met by 12/23  1. Pt will tolerate puree & thin liquids without s/s aspiration  2. Ongoing swallow assessment to determine safest & least restrictive PO consistencies    Speech Language Pathology Goals  Goals expected to be met by 12/22   1. Ongoing swallow assessment MET      Outcome: Ongoing, Progressing     Goals remain appropriate.  SLP recommending diet advancement to regular solids and thin liquids.   Emily P. Abadie M.S., CCC-SLP  Speech Language Pathologist  [phone]  12/18/2019

## 2019-12-18 NOTE — PROGRESS NOTES
Pharmacokinetic Assessment Follow Up: IV Vancomycin    Vancomycin serum concentration assessment(s) and Plan:    -Vancomycin random level was drawn appropriately this morning and can be used to guide therapy.   -Vancomycin will be given for a total of x 2 weeks (EOT ~12/26)  -Level returned within therapeutic range 15-20 mcg/mL @ 18.3. Appropriate for re-dosing post-HD today.  -Will give 1 x vancomycin 750 mg IV today post-HD.   -Will re-dose as appropriate per level and nephro recs.       Drug levels (last 3 results):  Recent Labs   Lab Result Units 12/16/19  0525 12/18/19  0430   Vancomycin, Random ug/mL 22.0 18.3       Pharmacy will continue to follow and monitor vancomycin.    Please contact pharmacy at extension [phone] for questions regarding this assessment.    Thank you for the consult,   Fan Irwin       Patient brief summary:  Balta Lamb is a 49 y.o. male initiated on antimicrobial therapy with IV Vancomycin for treatment of sepsis    The patient's current regimen is post-HD dosing of vancomycin.     Drug Allergies:   Review of patient's allergies indicates:  No Known Allergies    Actual Body Weight:   69.6 kg    Renal Function:   Estimated Creatinine Clearance: 10.9 mL/min (A) (based on SCr of 7.9 mg/dL (H)).,     Dialysis Method (if applicable):  intermittent HD    CBC (last 72 hours):  Recent Labs   Lab Result Units 12/16/19  0525 12/17/19  0507 12/18/19  0431   WBC K/uL 4.36 4.83 5.37  5.24   Hemoglobin g/dL 7.3* 8.6* 8.1*  8.1*   Hematocrit % 23.8* 25.8* 24.6*  24.7*   Platelets K/uL 133* 143* 159  156   Gran% % 73.1* 69.0 64.9  65.9   Lymph% % 13.1* 14.9* 19.2  18.9   Mono% % 10.8 12.6 11.7  11.1   Eosinophil% % 2.3 2.1 3.0  3.1   Basophil% % 0.5 0.4 0.6  0.4   Differential Method  Automated Automated Automated  Automated       Metabolic Panel (last 72 hours):  Recent Labs   Lab Result Units 12/16/19  0525 12/17/19  0507 12/18/19  0431   Sodium mmol/L 144 144 143   Potassium mmol/L  4.5 3.7 3.6   Chloride mmol/L 106 103 104   CO2 mmol/L 19* 24 24   Glucose mg/dL 78 99 135*   BUN, Bld mg/dL 57* 24* 32*   Creatinine mg/dL 9.4* 5.8* 7.9*   Albumin g/dL 3.0* 3.3* 2.8*   Total Bilirubin mg/dL 0.6 0.6 0.4   Alkaline Phosphatase U/L 78 88 84   AST U/L 21 36 37   ALT U/L 19 28 36   Magnesium mg/dL 2.4 2.1 2.3   Phosphorus mg/dL 7.3* 4.2 5.3*       Vancomycin Administrations:  vancomycin given in the last 96 hours                   vancomycin 750 mg in dextrose 5 % 250 mL IVPB (ready to mix system) (mg) 750 mg New Bag 12/16/19 1500                Microbiologic Results:  Microbiology Results (last 7 days)     Procedure Component Value Units Date/Time    Blood culture [[id number]] Collected:  12/14/19 1225    Order Status:  Completed Specimen:  Blood from Peripheral, Ankle, Right Updated:  12/17/19 1412     Blood Culture, Routine No Growth to date      No Growth to date      No Growth to date      No Growth to date    Blood culture [[id number]] Collected:  12/14/19 1220    Order Status:  Completed Specimen:  Blood from Peripheral, Foot, Left Updated:  12/17/19 1412     Blood Culture, Routine No Growth to date      No Growth to date      No Growth to date      No Growth to date    Blood culture [[id number]] Collected:  12/11/19 2017    Order Status:  Completed Specimen:  Blood from Peripheral, Antecubital, Left Updated:  12/16/19 2212     Blood Culture, Routine No growth after 5 days.    Narrative:       Blood cultures x 2 different sites. 4 bottles total. Please  draw cultures before administering antibiotics.    Blood culture [[id number]] Collected:  12/11/19 2017    Order Status:  Completed Specimen:  Blood from Peripheral, Upper Arm, Left Updated:  12/16/19 2212     Blood Culture, Routine No growth after 5 days.    Narrative:       Blood cultures from 2 different sites. 4 bottles total.  Please draw before starting antibiotics.    Culture, Respiratory with Gram Stain [[id number]]  (Abnormal)   (Susceptibility) Collected:  12/11/19 2023    Order Status:  Completed Specimen:  Respiratory from Sputum, Induced Updated:  12/16/19 1011     Respiratory Culture No Pseudomonas isolated.      METHICILLIN RESISTANT STAPHYLOCOCCUS AUREUS  Few  Normal respiratory eliane also present       Gram Stain (Respiratory) <10 epithelial cells per low power field.     Gram Stain (Respiratory) Rare WBC's     Gram Stain (Respiratory) Few Gram positive cocci     Gram Stain (Respiratory) Rare Gram negative rods    Narrative:       Mini-BAL.    Culture, Anaerobe [[id number]]     Order Status:  No result Specimen:  Sputum     Aerobic culture [[id number]]     Order Status:  No result Specimen:  Sputum

## 2019-12-18 NOTE — PROGRESS NOTES
[facility]  Physical Medicine & Rehab  Progress Note    Patient Name: Balta Lamb  MRN: [id number]  Admission Date: 12/11/2019  Length of Stay: 7 days  Attending Physician: Milana Xavier MD    Subjective:     Principal Problem:Altered mental status    Hospital Course:   12/12/19:  Evaluated by OT.  Session limited 2/2 intubated/acuity.  Bed mobility and ADLs totalA.    12/15/19:  Participated with OT.  Evaluated by SLP.  SLP recommendation: NPO for dysphagia.  Bed mobility Bill.  Sit to stand Bill and transfers modA.  Grooming modA.  UBD and LBD maxA.  12/16/19:  Participated with SLP.  Upgraded recommendation: puree diet and thin liquids.  PT evaluation pending.  12/17/19:  Evaluated by PT.  Participated with OT.  Bed mobility SBA.  Sit to stand Bill.  Ambulated 3 steps modA.  Grooming CGA and LBD CGA.    Interval History 12/18/2019:  Patient is seen for follow-up rehab evaluation and recommendations: More awake today.  No behavioral issues overnight.  Camera sitter at bedside.  Repeat CTH results pending.  Participating with therapy.      HPI, Past Medical, Family, and Social History remains the same as documented in the initial encounter.    Scheduled Medications:    sodium chloride 0.9%   Intravenous Once    atorvastatin  40 mg Per OG tube Daily    cloNIDine  0.1 mg Oral BID    heparin (porcine)  5,000 Units Subcutaneous Q8H    hydrALAZINE  50 mg Oral Q8H    NIFEdipine  60 mg Oral Daily    polyethylene glycol  17 g Per NG tube Daily    QUEtiapine  50 mg Per OG tube BID    senna-docusate 8.6-50 mg  1 tablet Per OG tube BID    sevelamer carbonate  0.8 g Oral TID WM    vancomycin (VANCOCIN) IVPB  750 mg Intravenous Once     PRN Medications: acetaminophen, albuterol sulfate, calcium gluconate IVPB, calcium gluconate IVPB, calcium gluconate IVPB, Dextrose 10% Bolus, Dextrose 10% Bolus, dextrose 50%, glucagon (human recombinant), glucose, glucose, haloperidol lactate, hydrALAZINE,  hydrALAZINE, insulin aspart U-100, melatonin, senna-docusate 8.6-50 mg, sodium chloride 0.9%, Pharmacy to dose Vancomycin consult **AND** vancomycin - pharmacy to dose    Review of Systems   Constitutional: Positive for activity change. Negative for chills and fatigue.   HENT: Negative for trouble swallowing and voice change.    Eyes: Positive for visual disturbance (L eye blind since childhood). Negative for pain.   Respiratory: Negative for cough and shortness of breath.    Cardiovascular: Negative for chest pain and palpitations.   Gastrointestinal: Negative for nausea and vomiting.   Genitourinary: Positive for decreased urine volume. Negative for flank pain.   Musculoskeletal: Positive for gait problem. Negative for arthralgias and myalgias.   Skin: Positive for color change. Negative for wound.   Neurological: Positive for weakness. Negative for dizziness, numbness and headaches.   Psychiatric/Behavioral: Negative for agitation. The patient is not nervous/anxious.      Objective:     Vital Signs (Most Recent):  Temp: 97.6 °F (36.4 °C) (12/18/19 0726)  Pulse: 83 (12/18/19 0726)  Resp: 14 (12/18/19 0726)  BP: (!) 186/74 (12/18/19 0726)  SpO2: 96 % (12/18/19 0726)    Vital Signs (24h Range):  Temp:  [97.5 °F (36.4 °C)-99 °F (37.2 °C)] 97.6 °F (36.4 °C)  Pulse:  [78-91] 83  Resp:  [14-18] 14  SpO2:  [90 %-96 %] 96 %  BP: (122-186)/(60-74) 186/74     Physical Exam   Constitutional: He is oriented to person, place, and time. He appears well-developed and well-nourished. No distress.   HENT:   Head: Normocephalic and atraumatic.   Right Ear: External ear normal.   Left Ear: External ear normal.   Nose: Nose normal.   Eyes: Right eye exhibits no discharge. Left eye exhibits no discharge. No scleral icterus.   Neck: Normal range of motion.   Cardiovascular: Normal rate and intact distal pulses.   Pulmonary/Chest: Effort normal. No respiratory distress.   Abdominal: Soft. He exhibits no distension. There is no  tenderness.   Musculoskeletal: He exhibits no edema or tenderness.   Neurological: He is alert and oriented to person, place, and time. No sensory deficit. He exhibits normal muscle tone.   Skin: Skin is warm and dry. Bruising noted.   Psychiatric: He has a normal mood and affect. His behavior is normal.   Camera sitter at bedside   Vitals reviewed.    Diagnostic Results:   Labs: Reviewed  X-Ray: Reviewed  CT: Reviewed    Assessment/Plan:      * Altered mental status  -  Acute metabolic encephalopathy 2/2 hypoglycemia   -  CTA multiphase impression without acute hemorrhage, major vascular distribution infarct, or high-grade stenosis/major vessel occlusion  -  Camera sitter at bedside  -  Management per primary team    Hypoglycemia, coma  -  Hyperkalemia at [facility] with attempted shift with insulin  -  Hypoglycemic on arrival and started on D10 infusion  -  Acute metabolic encephalopathy 2/2 hypoglycemia  -  Management per primary team    ESRD (end stage renal disease) on dialysis  -  On HD  -  Nephrology following    Impaired mobility and ADLs  -  (I) with ADLs and mobility at baseline  -  Related to acute hospitalization, weakness, impaired self care skills, impaired balance, decreased coordination, decreased safety awareness, decreased ROM, impaired coordination, decreased upper extremity function, impaired functional mobilty, impaired endurance, impaired sensation, gait instability, impaired cognition, decreased lower extremity function, impaired fine motor  See hospital course for functional status.    Recommendations  -  Encourage mobility, OOB in chair, and early ambulation as appropriate  -  PT/OT evaluate and treat  -  Pain management  -  DVT prophylaxis if appropriate   -  Monitor for and prevent skin breakdown and pressure ulcers  · Early mobility, repositioning/weight shifting every 20-30 minutes when sitting, turn patient every 2 hours, proper mattress/overlay and chair cushioning, pressure relief/heel  protector wali    Post-acute recommendation: likely candidate for Inpatient Rehab pending therapy progress and medical stability    VIMAL Haney  Department of Physical Medicine & Rehab   [facility]

## 2019-12-18 NOTE — PLAN OF CARE
12/18/19 1514   Post-Acute Status   Post-Acute Authorization Placement   Post-Acute Placement Status Referrals Sent     Tara Lynn LMSW  [facility]  [phone]

## 2019-12-18 NOTE — PLAN OF CARE
CM met with patient and sisters at bedside. Recommendations are inpatient rehab. Choice is [facility]. List left with family and CM contact number to provide additional choices.

## 2019-12-18 NOTE — PROGRESS NOTES
"              [facility] Hospital Medicine  Progress Note    Team: [facility] HOSP MED N   Attending MD: Milana Xavier MD  Admit Date: 12/11/2019  [place]   Length of Stay:  LOS: 7 days   Code status: Full Code    Principal Problem: Altered mental status    Interval hx: alert and interactive today; good memory of distant events but not clear with current events prior to admit; sisters at bedside    Events from admit reviewed.    ROS:  Constitutional: no fever or chills  Respiratory: no cough or shortness of breath  Cardiovascular: no chest pain or palpitations  Gastrointestinal: no nausea or vomiting, no abdominal pain; last BM: 12/17  Genitourinary: no hematuria or dysuria  Integument/Breast: no rash or pruritis  Musculoskeletal: no arthralgias or myalgias  Neurological: no seizures or tremors  Behavioral/Psych: no auditory or visual hallucinations      Physical Exam  Temp:  [97.5 °F (36.4 °C)-99 °F (37.2 °C)] 98.2 °F (36.8 °C)  Pulse:  [78-91] 85  Resp:  [14-18] 14  SpO2:  [90 %-99 %] 99 %  BP: (122-186)/(60-78) 171/78      Intake/Output Summary (Last 24 hours) at 12/18/2019 1509  Last data filed at 12/18/2019 0700  Gross per 24 hour   Intake 480 ml   Output --   Net 480 ml       Wt Readings from Last 1 Encounters:   12/18/19 0400 69.6 kg (153 lb 7 oz)   12/12/19 1205 77.6 kg (171 lb)   12/11/19 2217 77.6 kg (171 lb 1.2 oz)   12/11/19 1336 77.6 kg (171 lb)        Estimated body mass index is 22.02 kg/m² as calculated from the following:    Height as of this encounter: 5' 10" (1.778 m).    Weight as of this encounter: 69.6 kg (153 lb 7 oz).    General: well developed, well nourished, no distress  Lungs:  clear to auscultation bilaterally and normal respiratory effort.  Cardiovascular: Heart: regular rate and rhythm, S1, S2 normal, no murmur, click, rub or gallop. Chest Wall: no tenderness. Extremities: no cyanosis, no edema, no clubbing.   Abdomen/Rectal: Abdomen: soft, non-tender non-distended; " bowel sounds normal; no masses,  no organomegaly.   Skin: Skin color, texture, turgor normal. No rashes or lesions.  Neurologic: Normal strength and tone. No focal numbness or weakness.  Psych/Behavioral:  Alert/awake and appropriate; O X 3      Recent Results (from the past 24 hour(s))   POCT glucose    Collection Time: 12/17/19  5:21 PM   Result Value Ref Range    POCT Glucose 140 (H) 70 - 110 mg/dL   POCT glucose    Collection Time: 12/17/19  9:28 PM   Result Value Ref Range    POCT Glucose 213 (H) 70 - 110 mg/dL   Vancomycin, random    Collection Time: 12/18/19  4:30 AM   Result Value Ref Range    Vancomycin, Random 18.3 Not established ug/mL   Comprehensive metabolic panel    Collection Time: 12/18/19  4:31 AM   Result Value Ref Range    Sodium 143 136 - 145 mmol/L    Potassium 3.6 3.5 - 5.1 mmol/L    Chloride 104 95 - 110 mmol/L    CO2 24 23 - 29 mmol/L    Glucose 135 (H) 70 - 110 mg/dL    BUN, Bld 32 (H) 6 - 20 mg/dL    Creatinine 7.9 (H) 0.5 - 1.4 mg/dL    Calcium 8.0 (L) 8.7 - 10.5 mg/dL    Total Protein 5.7 (L) 6.0 - 8.4 g/dL    Albumin 2.8 (L) 3.5 - 5.2 g/dL    Total Bilirubin 0.4 0.1 - 1.0 mg/dL    Alkaline Phosphatase 84 55 - 135 U/L    AST 37 10 - 40 U/L    ALT 36 10 - 44 U/L    Anion Gap 15 8 - 16 mmol/L    eGFR if African American 8.2 (A) >60 mL/min/1.73 m^2    eGFR if non  7.1 (A) >60 mL/min/1.73 m^2   CBC auto differential    Collection Time: 12/18/19  4:31 AM   Result Value Ref Range    WBC 5.24 3.90 - 12.70 K/uL    RBC 2.38 (L) 4.60 - 6.20 M/uL    Hemoglobin 8.1 (L) 14.0 - 18.0 g/dL    Hematocrit 24.7 (L) 40.0 - 54.0 %    Mean Corpuscular Volume 104 (H) 82 - 98 fL    Mean Corpuscular Hemoglobin 34.0 (H) 27.0 - 31.0 pg    Mean Corpuscular Hemoglobin Conc 32.8 32.0 - 36.0 g/dL    RDW 13.5 11.5 - 14.5 %    Platelets 156 150 - 350 K/uL    MPV 10.5 9.2 - 12.9 fL    Immature Granulocytes 0.6 (H) 0.0 - 0.5 %    Gran # (ANC) 3.5 1.8 - 7.7 K/uL    Immature Grans (Abs) 0.03 0.00 - 0.04 K/uL     Lymph # 1.0 1.0 - 4.8 K/uL    Mono # 0.6 0.3 - 1.0 K/uL    Eos # 0.2 0.0 - 0.5 K/uL    Baso # 0.02 0.00 - 0.20 K/uL    nRBC 0 0 /100 WBC    Gran% 65.9 38.0 - 73.0 %    Lymph% 18.9 18.0 - 48.0 %    Mono% 11.1 4.0 - 15.0 %    Eosinophil% 3.1 0.0 - 8.0 %    Basophil% 0.4 0.0 - 1.9 %    Platelet Estimate Appears normal     Aniso Slight     Poik Slight     Poly Occasional     Hypo Occasional     Ovalocytes Occasional     Differential Method Automated    Magnesium    Collection Time: 12/18/19  4:31 AM   Result Value Ref Range    Magnesium 2.3 1.6 - 2.6 mg/dL   Phosphorus    Collection Time: 12/18/19  4:31 AM   Result Value Ref Range    Phosphorus 5.3 (H) 2.7 - 4.5 mg/dL   CBC auto differential    Collection Time: 12/18/19  4:31 AM   Result Value Ref Range    WBC 5.37 3.90 - 12.70 K/uL    RBC 2.36 (L) 4.60 - 6.20 M/uL    Hemoglobin 8.1 (L) 14.0 - 18.0 g/dL    Hematocrit 24.6 (L) 40.0 - 54.0 %    Mean Corpuscular Volume 104 (H) 82 - 98 fL    Mean Corpuscular Hemoglobin 34.3 (H) 27.0 - 31.0 pg    Mean Corpuscular Hemoglobin Conc 32.9 32.0 - 36.0 g/dL    RDW 13.3 11.5 - 14.5 %    Platelets 159 150 - 350 K/uL    MPV 10.9 9.2 - 12.9 fL    Immature Granulocytes 0.6 (H) 0.0 - 0.5 %    Gran # (ANC) 3.5 1.8 - 7.7 K/uL    Immature Grans (Abs) 0.03 0.00 - 0.04 K/uL    Lymph # 1.0 1.0 - 4.8 K/uL    Mono # 0.6 0.3 - 1.0 K/uL    Eos # 0.2 0.0 - 0.5 K/uL    Baso # 0.03 0.00 - 0.20 K/uL    nRBC 0 0 /100 WBC    Gran% 64.9 38.0 - 73.0 %    Lymph% 19.2 18.0 - 48.0 %    Mono% 11.7 4.0 - 15.0 %    Eosinophil% 3.0 0.0 - 8.0 %    Basophil% 0.6 0.0 - 1.9 %    Platelet Estimate Appears normal     Aniso Slight     Differential Method Automated    POCT glucose    Collection Time: 12/18/19  6:11 AM   Result Value Ref Range    POCT Glucose 119 (H) 70 - 110 mg/dL   POCT glucose    Collection Time: 12/18/19  7:24 AM   Result Value Ref Range    POCT Glucose 112 (H) 70 - 110 mg/dL   POCT glucose    Collection Time: 12/18/19 12:15 PM   Result Value Ref  Range    POCT Glucose 213 (H) 70 - 110 mg/dL       Recent Labs   Lab 12/16/19  0525 12/17/19  0507 12/18/19  0431   WBC 4.36 4.83 5.37  5.24   HGB 7.3* 8.6* 8.1*  8.1*   HCT 23.8* 25.8* 24.6*  24.7*   * 143* 159  156       Recent Labs   Lab 12/16/19  0525 12/17/19  0507 12/18/19  0431    144 143   K 4.5 3.7 3.6    103 104   CO2 19* 24 24   BUN 57* 24* 32*   CREATININE 9.4* 5.8* 7.9*   GLU 78 99 135*   CALCIUM 7.9* 8.6* 8.0*   MG 2.4 2.1 2.3   PHOS 7.3* 4.2 5.3*       Recent Labs   Lab 12/11/19  1524  12/16/19  0525 12/17/19  0507 12/18/19  0431   ALKPHOS  --    < > 78 88 84   ALT  --    < > 19 28 36   AST  --    < > 21 36 37   ALBUMIN 3.9   < > 3.0* 3.3* 2.8*   PROT  --    < > 6.0 6.3 5.7*   BILITOT  --    < > 0.6 0.6 0.4   INR 1.1  --   --   --   --     < > = values in this interval not displayed.       Recent Labs   Lab 12/17/19  1203 12/17/19  1721 12/17/19  2128 12/18/19  0611 12/18/19  0724 12/18/19  1215   POCTGLUCOSE 114* 140* 213* 119* 112* 213*       Hemoglobin A1C   Date Value Ref Range Status   12/11/2019 7.5 (H) 4.0 - 5.6 % Final     Comment:     ADA Screening Guidelines:  5.7-6.4%  Consistent with prediabetes  >or=6.5%  Consistent with diabetes  High levels of fetal hemoglobin interfere with the HbA1C  assay. Heterozygous hemoglobin variants (HbS, HgC, etc)do  not significantly interfere with this assay.   However, presence of multiple variants may affect accuracy.         Scheduled Meds:   sodium chloride 0.9%   Intravenous Once    atorvastatin  40 mg Per OG tube Daily    cloNIDine  0.1 mg Oral BID    heparin (porcine)  5,000 Units Subcutaneous Q8H    hydrALAZINE  50 mg Oral Q8H    NIFEdipine  60 mg Oral Daily    polyethylene glycol  17 g Per NG tube Daily    QUEtiapine  50 mg Per OG tube BID    senna-docusate 8.6-50 mg  1 tablet Per OG tube BID    sevelamer carbonate  0.8 g Oral TID WM    vancomycin (VANCOCIN) IVPB  750 mg Intravenous Once       Continuous  Infusions:    As Needed: acetaminophen, albuterol sulfate, calcium gluconate IVPB, calcium gluconate IVPB, calcium gluconate IVPB, Dextrose 10% Bolus, Dextrose 10% Bolus, dextrose 50%, glucagon (human recombinant), glucose, glucose, haloperidol lactate, hydrALAZINE, hydrALAZINE, insulin aspart U-100, melatonin, senna-docusate 8.6-50 mg, sodium chloride 0.9%, Pharmacy to dose Vancomycin consult **AND** vancomycin - pharmacy to dose    Active Hospital Problems    Diagnosis  POA    *Altered mental status [R41.82]  Yes    Impaired mobility and ADLs [Z74.09]  No    MRSA (methicillin resistant Staphylococcus aureus) infection [A49.02]  Yes    Agitation [R45.1]  Unknown    Essential hypertension [I10]  Yes    ESRD (end stage renal disease) on dialysis [N18.6, Z99.2]  Not Applicable    Type 2 diabetes mellitus [E11.9]  Yes    Hypoglycemia, coma [E15]  Yes    Acute respiratory failure with hypoxia [J96.01]  Yes    Hyperkalemia [E87.5]  Yes    Metabolic acidosis [E87.2]  Yes    Hypoglycemia [E16.2]  Yes    Acute encephalopathy [G93.40]  Yes      Resolved Hospital Problems    Diagnosis Date Resolved POA    On mechanically assisted ventilation [Z99.11] 12/15/2019 Not Applicable         Assessment and Plan    Altered mental status due to hypoglycemia    49 year old male with HTN, DM2 and ESRD on HD who presented to OSH with acute onset L sided weakness and confusion.   - CT Head WO OSH unremarkable for acute infarction or hemorrhage   - CTA Head/neck - no acute change, no high grade stenosis  - SBP <200 mmHg  - EKG NSR , ECHO EF 53%  - SSI  - Q4 POCT Glucose checks  - MRI showed no acute intracranial abnormalities   12/17/2019: had an episode of hypoglycemia on 12/15 and glucoses > 150 prior; not on diabetic diet; maintain glucoses > 70 to ensure hypoglycemia not causing behavior changes; repeat CT as patient's sister states this behavior is similar to previous when he had head injury     Dysphagia    - advanced to  renal diet with thin liquids on 12/18  - Nepro tid    Agitation  Agitation 2/2 metabolic encephalopathy   -d/c EEG   -acute agitation with correction of glucose   - required fentanyl gtt, precedex in NCC  - recurrence of agitation/confusion on 12/16 with improvement after haldol/ativan  - continue scheduled quetiapine 50 BID      MRSA infection -  in sputum  Started on zosyn, de-escalated to ceftriaxone 12/15  12/15 -felt to be colonization, but pt was on a ventilator.  willl treat with vanc w HD x 2 weeks - end date 12/30     Essential hypertension  - Hypertensive on admit,    - Goal SBP <200  - norvasc 5 po daily  - hydralazine 25 q 8 hours prn SBP > 180     ESRD (end stage renal disease) on dialysis  - HD fistula in LUE  - Nephrology following   - Renally dose medications  - Strict I/Os   - HD 12/16, scheduled MWF      Hypoglycemia, coma  - Present on admission  - OSH attempted to shift HyperK with insulin    - On arrival, pt with glucose <5  - D10 drip gtt stopped (12/12); resume as indicated  - on 12/12 Glucose (248)  - POCT glucose q4 hour and SSI   - Monitor CMP     Type 2 diabetes mellitus  - Home glipizide held  - on admit glucose <5, started on D10 drip d/t hypoglycemia   - D10 gtt stopped (12/12)   - (12/13) Glucose 131  - on SSI, POCT glucose q4hrs        Diet: Diet renal Thin   DVT PPx:   VTE Risk Mitigation (From admission, onward)         Ordered     heparin (porcine) injection 5,000 Units  Every 8 hours      12/12/19 0949     IP VTE LOW RISK PATIENT  Once      12/11/19 1520     Place sequential compression device  Until discontinued      12/11/19 1520                Does patient have Capacity?  No,   Goals- resume HD, pt repeated that he wants to die to nurses on 12/16 - new per sister; no answering today  Code Status- FULL  Pain management- well controlled  Prognosis-  fair  Family discussions-   12/17 - with sister who has not been familiar with his medical history recently but knew about  trauma with brain injury in [place]; he is appropriate when they converse by phone.  Functional Status - PT/OT  Inpatient rehab recommended.    Discharge: Inpatient rehab    Milana Xavier MD  Hospital Medicine [facility]  [phone]

## 2019-12-18 NOTE — PROGRESS NOTES
Maintenance hemodialysis tx initiated via [facility], toelrated well, flows good. Dialysis days are MWF. Isolation status maintained

## 2019-12-18 NOTE — PT/OT/SLP PROGRESS
Occupational Therapy   Treatment    Name: Balta Lamb  MRN: [id number]  Admitting Diagnosis:  Altered mental status       Recommendations:     Discharge Recommendations: rehabilitation facility  Discharge Equipment Recommendations:  bath bench  Barriers to discharge:  Inaccessible home environment, Decreased caregiver support    Assessment:     Balta Lamb is a 49 y.o. male with a medical diagnosis of Altered mental status.  He presents with the following performance deficits affecting function are weakness, impaired functional mobilty, impaired self care skills, impaired endurance, gait instability, impaired cognition, decreased upper extremity function, decreased lower extremity function, decreased safety awareness, impaired cardiopulmonary response to activity. Patient participated very well with therapy. Patient completed functional transfers with minimal to moderate assist and ADL's with contact guard-minimal assist. Patient tolerated edge of bed and out of bed activity for ~25 minutes with varying levels of assist due to balance impairments, decreased insight into deficits, visual impairments, and motor coordination deficits (L hemibody > R hemibody). Patient's visual impairments affect visual scanning in frontal plane when presented with targets and consequently motor planning with L UE, demonstrates currently impaired (not baseline) adaptive strategies to ensure safe completion of ADL's and functional mobility.    Patient is unsafe to return home at this time, and would benefit from continued skilled OT services, 4 times a week, and would benefit from continued therapy in rehabilitation facility to maximize functional independence and address deficits listed above. Therapist educated patient regarding progression and safety with mobility.     At this time, patient is safe to participate in edge of bed and bed to chair activity with nursing with minimal to moderate level of assistance within patient's  "level of activity tolerance.      Rehab Prognosis:  Good; patient would benefit from acute skilled OT services to address these deficits and reach maximum level of function.       Plan:     Patient to be seen 4 x/week to address the above listed problems via self-care/home management, therapeutic activities, therapeutic exercises  · Plan of Care Expires: 01/09/20  · Plan of Care Reviewed with: patient, family    Subjective   "Ok, I understand. You are right, I do need help to do all of this and I didn't before."    Re: Pt and therapist discussed level of functioning extensively to ensure patient safety.    Pain/Comfort:  · Pain Rating 1: 0/10  · Location 1: back  · Pain Rating Post-Intervention 1: 0/10  · Pain Addressed 2: Reposition, Distraction  · Pain Rating Post-Intervention 2: 0/10    Objective:     Communicated with: Kinga prior to session.  Patient found supine with telemetry upon OT entry to room.    General Precautions: Standard, fall   Orthopedic Precautions:N/A   Braces: N/A     Occupational Performance:     Bed Mobility:    · Patient completed Rolling/Turning to Right with stand by assistance  · Patient completed Scooting/Bridging with contact guard assistance  · Patient completed Supine to Sit with contact guard assistance  · Patient completed Sit to Supine with contact guard assistance     Functional Mobility/Transfers:  · Patient completed Sit <> Stand Transfer with minimum assistance  with  hand-held assist , x 3 trials, required v/t cues for placement of BUE's each time as well as hip extension upon standing  · Functional Mobility: Moderate assistance for ~20 feet at edge of bed:  · Lateral Steps, forward <>back x 4 trials with seated rest break x 2  · Pt required manual weight-shift due to posterior lateral lean to left  · Difficulty weight-shifting to advance L LE  · Verbal and tactile cues to achieve and maintain balance/upright posture  · Tactile cues for motor planning/placement of L LE "   · Increased verbal and tactile cues for placement of BUE's to transition from sit <> stand    Activities of Daily Living:  · Grooming: contact guard assistance seated edge of bed, cues for visual scanning to L for tools, increased time required and dysmetria noted with L hand  · Upper Body Dressing: minimum assistance in long sit, for manipulation of buttons  · Lower Body Dressing: contact guard assistance in long sit, increased time to complete, patient demo'ing use of tactile cues for orientation of sock and placement on foot/completion of task      [facility] 6 Click ADL: 14    Treatment & Education:  -Patient extensively educated on safety and current level of functioning regarding functional mobility, transfers, and balance. Pt and family in agreement that pt would benefit from continued skilled therapy.  -Patient and family educated on roles/goals of OT and POC.  -White board updated.  -Therapist provided time for questions and answered within scope of practice.  -Patient educated on importance of EOB/OOB activity to maximize recovery.    Patient left supine with all lines intact, call button in reach, nsg notified and family members x 2 presentEducation:      GOALS:   Multidisciplinary Problems     Occupational Therapy Goals        Problem: Occupational Therapy Goal    Goal Priority Disciplines Outcome Interventions   Occupational Therapy Goal     OT, PT/OT Ongoing, Progressing    Description:  Goals set 12/15 to be addressed for 14 days with expiration date, 12/29:  Patient will increase functional independence with ADLs by performing:    Patient will demonstrate rolling to the right with supervision.     Patient will demonstrate rolling to the left with supervision.  Patient will demonstrate supine -sit with supervision.     Patient will demonstrate stand pivot transfers with min assist.     Patient will demonstrate grooming while seated with setup (A).  Patient will demonstrate upper body dressing with min  assist while seated EOB.   Progressing 12/17  Patient will demonstrate lower body dressing with min assist while seated EOB.     Patient will demonstrate toileting with min assist.     Patient will demonstrate bathing while seated EOB with min assist.     Patient's family / caregiver will demonstrate independence and safety with assisting patient with self-care skills and functional mobility.                               Time Tracking:     OT Date of Treatment: 12/18/19  OT Start Time: 1354  OT Stop Time: 1433  OT Total Time (min): 39 min    Billable Minutes:Self Care/Home Management 12  Therapeutic Activity 12  Neuromuscular Re-education 15    Alicia Serrano, OT  12/18/2019

## 2019-12-18 NOTE — SUBJECTIVE & OBJECTIVE
Interval History 12/18/2019:  Patient is seen for follow-up rehab evaluation and recommendations: More awake today.  No behavioral issues overnight.  Camera sitter at bedside.  Repeat CTH results pending.  Participating with therapy.      HPI, Past Medical, Family, and Social History remains the same as documented in the initial encounter.    Scheduled Medications:    sodium chloride 0.9%   Intravenous Once    atorvastatin  40 mg Per OG tube Daily    cloNIDine  0.1 mg Oral BID    heparin (porcine)  5,000 Units Subcutaneous Q8H    hydrALAZINE  50 mg Oral Q8H    NIFEdipine  60 mg Oral Daily    polyethylene glycol  17 g Per NG tube Daily    QUEtiapine  50 mg Per OG tube BID    senna-docusate 8.6-50 mg  1 tablet Per OG tube BID    sevelamer carbonate  0.8 g Oral TID WM    vancomycin (VANCOCIN) IVPB  750 mg Intravenous Once     PRN Medications: acetaminophen, albuterol sulfate, calcium gluconate IVPB, calcium gluconate IVPB, calcium gluconate IVPB, Dextrose 10% Bolus, Dextrose 10% Bolus, dextrose 50%, glucagon (human recombinant), glucose, glucose, haloperidol lactate, hydrALAZINE, hydrALAZINE, insulin aspart U-100, melatonin, senna-docusate 8.6-50 mg, sodium chloride 0.9%, Pharmacy to dose Vancomycin consult **AND** vancomycin - pharmacy to dose    Review of Systems   Constitutional: Positive for activity change. Negative for chills and fatigue.   HENT: Negative for trouble swallowing and voice change.    Eyes: Positive for visual disturbance (L eye blind since childhood). Negative for pain.   Respiratory: Negative for cough and shortness of breath.    Cardiovascular: Negative for chest pain and palpitations.   Gastrointestinal: Negative for nausea and vomiting.   Genitourinary: Positive for decreased urine volume. Negative for flank pain.   Musculoskeletal: Positive for gait problem. Negative for arthralgias and myalgias.   Skin: Positive for color change. Negative for wound.   Neurological: Positive for  weakness. Negative for dizziness, numbness and headaches.   Psychiatric/Behavioral: Negative for agitation. The patient is not nervous/anxious.      Objective:     Vital Signs (Most Recent):  Temp: 97.6 °F (36.4 °C) (12/18/19 0726)  Pulse: 83 (12/18/19 0726)  Resp: 14 (12/18/19 0726)  BP: (!) 186/74 (12/18/19 0726)  SpO2: 96 % (12/18/19 0726)    Vital Signs (24h Range):  Temp:  [97.5 °F (36.4 °C)-99 °F (37.2 °C)] 97.6 °F (36.4 °C)  Pulse:  [78-91] 83  Resp:  [14-18] 14  SpO2:  [90 %-96 %] 96 %  BP: (122-186)/(60-74) 186/74     Physical Exam   Constitutional: He is oriented to person, place, and time. He appears well-developed and well-nourished. No distress.   HENT:   Head: Normocephalic and atraumatic.   Right Ear: External ear normal.   Left Ear: External ear normal.   Nose: Nose normal.   Eyes: Right eye exhibits no discharge. Left eye exhibits no discharge. No scleral icterus.   Neck: Normal range of motion.   Cardiovascular: Normal rate and intact distal pulses.   Pulmonary/Chest: Effort normal. No respiratory distress.   Abdominal: Soft. He exhibits no distension. There is no tenderness.   Musculoskeletal: He exhibits no edema or tenderness.   Neurological: He is alert and oriented to person, place, and time. No sensory deficit. He exhibits normal muscle tone.   Skin: Skin is warm and dry. Bruising noted.   Psychiatric: He has a normal mood and affect. His behavior is normal.   Camera sitter at bedside   Vitals reviewed.    Diagnostic Results:   Labs: Reviewed  X-Ray: Reviewed  CT: Reviewed    NEUROLOGICAL EXAMINATION:     MENTAL STATUS   Oriented to person, place, and time.

## 2019-12-18 NOTE — PLAN OF CARE
Continue per OT POC, all goals remain appropriate.    Problem: Occupational Therapy Goal  Goal: Occupational Therapy Goal  Description  Goals set 12/15 to be addressed for 14 days with expiration date, 12/29:  Patient will increase functional independence with ADLs by performing:    Patient will demonstrate rolling to the right with supervision.     Patient will demonstrate rolling to the left with supervision.  Patient will demonstrate supine -sit with supervision.     Patient will demonstrate stand pivot transfers with min assist.     Patient will demonstrate grooming while seated with setup (A).  Patient will demonstrate upper body dressing with min assist while seated EOB.   Progressing 12/17  Patient will demonstrate lower body dressing with min assist while seated EOB.     Patient will demonstrate toileting with min assist.     Patient will demonstrate bathing while seated EOB with min assist.     Patient's family / caregiver will demonstrate independence and safety with assisting patient with self-care skills and functional mobility.              Outcome: Ongoing, Progressing

## 2019-12-19 LAB
ALBUMIN SERPL BCP-MCNC: 2.9 G/DL (ref 3.5–5.2)
ALP SERPL-CCNC: 86 U/L (ref 55–135)
ALT SERPL W/O P-5'-P-CCNC: 48 U/L (ref 10–44)
ANION GAP SERPL CALC-SCNC: 10 MMOL/L (ref 8–16)
ANISOCYTOSIS BLD QL SMEAR: SLIGHT
AST SERPL-CCNC: 43 U/L (ref 10–40)
BACTERIA BLD CULT: NORMAL
BACTERIA BLD CULT: NORMAL
BASOPHILS # BLD AUTO: 0.02 K/UL (ref 0–0.2)
BASOPHILS # BLD AUTO: 0.02 K/UL (ref 0–0.2)
BASOPHILS NFR BLD: 0.4 % (ref 0–1.9)
BASOPHILS NFR BLD: 0.4 % (ref 0–1.9)
BILIRUB SERPL-MCNC: 0.5 MG/DL (ref 0.1–1)
BUN SERPL-MCNC: 20 MG/DL (ref 6–20)
CALCIUM SERPL-MCNC: 8.3 MG/DL (ref 8.7–10.5)
CHLORIDE SERPL-SCNC: 106 MMOL/L (ref 95–110)
CO2 SERPL-SCNC: 24 MMOL/L (ref 23–29)
CREAT SERPL-MCNC: 5.9 MG/DL (ref 0.5–1.4)
DIFFERENTIAL METHOD: ABNORMAL
DIFFERENTIAL METHOD: ABNORMAL
EOSINOPHIL # BLD AUTO: 0.2 K/UL (ref 0–0.5)
EOSINOPHIL # BLD AUTO: 0.2 K/UL (ref 0–0.5)
EOSINOPHIL NFR BLD: 2.9 % (ref 0–8)
EOSINOPHIL NFR BLD: 3.7 % (ref 0–8)
ERYTHROCYTE [DISTWIDTH] IN BLOOD BY AUTOMATED COUNT: 13.2 % (ref 11.5–14.5)
ERYTHROCYTE [DISTWIDTH] IN BLOOD BY AUTOMATED COUNT: 13.3 % (ref 11.5–14.5)
EST. GFR  (AFRICAN AMERICAN): 11.7 ML/MIN/1.73 M^2
EST. GFR  (NON AFRICAN AMERICAN): 10.1 ML/MIN/1.73 M^2
GLUCOSE SERPL-MCNC: 218 MG/DL (ref 70–110)
HCT VFR BLD AUTO: 25.8 % (ref 40–54)
HCT VFR BLD AUTO: 25.9 % (ref 40–54)
HGB BLD-MCNC: 8.2 G/DL (ref 14–18)
HGB BLD-MCNC: 8.3 G/DL (ref 14–18)
HYPOCHROMIA BLD QL SMEAR: ABNORMAL
IMM GRANULOCYTES # BLD AUTO: 0.03 K/UL (ref 0–0.04)
IMM GRANULOCYTES # BLD AUTO: 0.05 K/UL (ref 0–0.04)
IMM GRANULOCYTES NFR BLD AUTO: 0.6 % (ref 0–0.5)
IMM GRANULOCYTES NFR BLD AUTO: 1 % (ref 0–0.5)
LYMPHOCYTES # BLD AUTO: 1.1 K/UL (ref 1–4.8)
LYMPHOCYTES # BLD AUTO: 1.2 K/UL (ref 1–4.8)
LYMPHOCYTES NFR BLD: 21.7 % (ref 18–48)
LYMPHOCYTES NFR BLD: 22.7 % (ref 18–48)
MAGNESIUM SERPL-MCNC: 2.1 MG/DL (ref 1.6–2.6)
MCH RBC QN AUTO: 33.2 PG (ref 27–31)
MCH RBC QN AUTO: 33.3 PG (ref 27–31)
MCHC RBC AUTO-ENTMCNC: 31.8 G/DL (ref 32–36)
MCHC RBC AUTO-ENTMCNC: 32 G/DL (ref 32–36)
MCV RBC AUTO: 104 FL (ref 82–98)
MCV RBC AUTO: 105 FL (ref 82–98)
MONOCYTES # BLD AUTO: 0.5 K/UL (ref 0.3–1)
MONOCYTES # BLD AUTO: 0.6 K/UL (ref 0.3–1)
MONOCYTES NFR BLD: 10.4 % (ref 4–15)
MONOCYTES NFR BLD: 10.7 % (ref 4–15)
NEUTROPHILS # BLD AUTO: 3.2 K/UL (ref 1.8–7.7)
NEUTROPHILS # BLD AUTO: 3.3 K/UL (ref 1.8–7.7)
NEUTROPHILS NFR BLD: 61.8 % (ref 38–73)
NEUTROPHILS NFR BLD: 63.7 % (ref 38–73)
NRBC BLD-RTO: 0 /100 WBC
NRBC BLD-RTO: 0 /100 WBC
OVALOCYTES BLD QL SMEAR: ABNORMAL
PHOSPHATE SERPL-MCNC: 3.7 MG/DL (ref 2.7–4.5)
PLATELET # BLD AUTO: 173 K/UL (ref 150–350)
PLATELET # BLD AUTO: 181 K/UL (ref 150–350)
PLATELET BLD QL SMEAR: ABNORMAL
PMV BLD AUTO: 10.5 FL (ref 9.2–12.9)
PMV BLD AUTO: 10.7 FL (ref 9.2–12.9)
POCT GLUCOSE: 194 MG/DL (ref 70–110)
POCT GLUCOSE: 244 MG/DL (ref 70–110)
POCT GLUCOSE: 265 MG/DL (ref 70–110)
POCT GLUCOSE: 286 MG/DL (ref 70–110)
POIKILOCYTOSIS BLD QL SMEAR: SLIGHT
POTASSIUM SERPL-SCNC: 4.1 MMOL/L (ref 3.5–5.1)
PROT SERPL-MCNC: 6.2 G/DL (ref 6–8.4)
RBC # BLD AUTO: 2.47 M/UL (ref 4.6–6.2)
RBC # BLD AUTO: 2.49 M/UL (ref 4.6–6.2)
SODIUM SERPL-SCNC: 140 MMOL/L (ref 136–145)
VANCOMYCIN SERPL-MCNC: 26.4 UG/ML
WBC # BLD AUTO: 5.12 K/UL (ref 3.9–12.7)
WBC # BLD AUTO: 5.19 K/UL (ref 3.9–12.7)

## 2019-12-19 PROCEDURE — 97530 THERAPEUTIC ACTIVITIES: CPT

## 2019-12-19 PROCEDURE — 93010 EKG 12-LEAD: ICD-10-PCS | Mod: ,,, | Performed by: INTERNAL MEDICINE

## 2019-12-19 PROCEDURE — 97116 GAIT TRAINING THERAPY: CPT

## 2019-12-19 PROCEDURE — 99232 SBSQ HOSP IP/OBS MODERATE 35: CPT | Mod: ,,, | Performed by: NURSE PRACTITIONER

## 2019-12-19 PROCEDURE — 84100 ASSAY OF PHOSPHORUS: CPT

## 2019-12-19 PROCEDURE — 25000003 PHARM REV CODE 250: Performed by: INTERNAL MEDICINE

## 2019-12-19 PROCEDURE — 99232 SBSQ HOSP IP/OBS MODERATE 35: CPT | Mod: ,,, | Performed by: INTERNAL MEDICINE

## 2019-12-19 PROCEDURE — 63600175 PHARM REV CODE 636 W HCPCS: Performed by: PSYCHIATRY & NEUROLOGY

## 2019-12-19 PROCEDURE — 93010 ELECTROCARDIOGRAM REPORT: CPT | Mod: ,,, | Performed by: INTERNAL MEDICINE

## 2019-12-19 PROCEDURE — 80053 COMPREHEN METABOLIC PANEL: CPT

## 2019-12-19 PROCEDURE — 11000001 HC ACUTE MED/SURG PRIVATE ROOM

## 2019-12-19 PROCEDURE — 25000003 PHARM REV CODE 250: Performed by: STUDENT IN AN ORGANIZED HEALTH CARE EDUCATION/TRAINING PROGRAM

## 2019-12-19 PROCEDURE — 83735 ASSAY OF MAGNESIUM: CPT

## 2019-12-19 PROCEDURE — 99232 PR SUBSEQUENT HOSPITAL CARE,LEVL II: ICD-10-PCS | Mod: ,,, | Performed by: INTERNAL MEDICINE

## 2019-12-19 PROCEDURE — 99232 PR SUBSEQUENT HOSPITAL CARE,LEVL II: ICD-10-PCS | Mod: ,,, | Performed by: NURSE PRACTITIONER

## 2019-12-19 PROCEDURE — 92526 ORAL FUNCTION THERAPY: CPT

## 2019-12-19 PROCEDURE — 25000003 PHARM REV CODE 250: Performed by: HOSPITALIST

## 2019-12-19 PROCEDURE — 93005 ELECTROCARDIOGRAM TRACING: CPT

## 2019-12-19 PROCEDURE — 36415 COLL VENOUS BLD VENIPUNCTURE: CPT

## 2019-12-19 PROCEDURE — 80202 ASSAY OF VANCOMYCIN: CPT

## 2019-12-19 PROCEDURE — 25000003 PHARM REV CODE 250: Performed by: PSYCHIATRY & NEUROLOGY

## 2019-12-19 PROCEDURE — 25000003 PHARM REV CODE 250: Performed by: NURSE PRACTITIONER

## 2019-12-19 PROCEDURE — 85025 COMPLETE CBC W/AUTO DIFF WBC: CPT | Mod: 91

## 2019-12-19 PROCEDURE — 63600175 PHARM REV CODE 636 W HCPCS: Performed by: INTERNAL MEDICINE

## 2019-12-19 RX ORDER — AMOXICILLIN 250 MG
1 CAPSULE ORAL 2 TIMES DAILY
Status: DISCONTINUED | OUTPATIENT
Start: 2019-12-19 | End: 2019-12-23 | Stop reason: HOSPADM

## 2019-12-19 RX ORDER — SODIUM CHLORIDE 9 MG/ML
INJECTION, SOLUTION INTRAVENOUS ONCE
Status: DISCONTINUED | OUTPATIENT
Start: 2019-12-20 | End: 2019-12-23 | Stop reason: HOSPADM

## 2019-12-19 RX ORDER — ATORVASTATIN CALCIUM 20 MG/1
40 TABLET, FILM COATED ORAL DAILY
Status: DISCONTINUED | OUTPATIENT
Start: 2019-12-20 | End: 2019-12-23 | Stop reason: HOSPADM

## 2019-12-19 RX ORDER — POLYETHYLENE GLYCOL 3350 17 G/17G
17 POWDER, FOR SOLUTION ORAL DAILY
Status: DISCONTINUED | OUTPATIENT
Start: 2019-12-20 | End: 2019-12-23 | Stop reason: HOSPADM

## 2019-12-19 RX ORDER — QUETIAPINE FUMARATE 25 MG/1
50 TABLET, FILM COATED ORAL 2 TIMES DAILY
Status: DISCONTINUED | OUTPATIENT
Start: 2019-12-19 | End: 2019-12-19

## 2019-12-19 RX ORDER — FAMOTIDINE 40 MG/5ML
20 POWDER, FOR SUSPENSION ORAL DAILY PRN
Status: DISCONTINUED | OUTPATIENT
Start: 2019-12-19 | End: 2019-12-23 | Stop reason: HOSPADM

## 2019-12-19 RX ORDER — PANTOPRAZOLE SODIUM 40 MG/1
40 TABLET, DELAYED RELEASE ORAL DAILY
Status: DISCONTINUED | OUTPATIENT
Start: 2019-12-19 | End: 2019-12-23 | Stop reason: HOSPADM

## 2019-12-19 RX ORDER — INSULIN ASPART 100 [IU]/ML
0-5 INJECTION, SOLUTION INTRAVENOUS; SUBCUTANEOUS
Status: DISCONTINUED | OUTPATIENT
Start: 2019-12-19 | End: 2019-12-20

## 2019-12-19 RX ORDER — QUETIAPINE FUMARATE 25 MG/1
50 TABLET, FILM COATED ORAL NIGHTLY
Status: DISCONTINUED | OUTPATIENT
Start: 2019-12-19 | End: 2019-12-20

## 2019-12-19 RX ORDER — QUETIAPINE FUMARATE 25 MG/1
25 TABLET, FILM COATED ORAL DAILY
Status: DISCONTINUED | OUTPATIENT
Start: 2019-12-20 | End: 2019-12-21

## 2019-12-19 RX ADMIN — QUETIAPINE FUMARATE 50 MG: 25 TABLET ORAL at 08:12

## 2019-12-19 RX ADMIN — INSULIN ASPART 3 UNITS: 100 INJECTION, SOLUTION INTRAVENOUS; SUBCUTANEOUS at 05:12

## 2019-12-19 RX ADMIN — PANTOPRAZOLE SODIUM 40 MG: 40 TABLET, DELAYED RELEASE ORAL at 05:12

## 2019-12-19 RX ADMIN — HEPARIN SODIUM 5000 UNITS: 5000 INJECTION, SOLUTION INTRAVENOUS; SUBCUTANEOUS at 10:12

## 2019-12-19 RX ADMIN — NIFEDIPINE 60 MG: 60 TABLET, FILM COATED, EXTENDED RELEASE ORAL at 08:12

## 2019-12-19 RX ADMIN — SEVELAMER CARBONATE 0.8 G: 800 POWDER, FOR SUSPENSION ORAL at 08:12

## 2019-12-19 RX ADMIN — HYDRALAZINE HYDROCHLORIDE 50 MG: 50 TABLET, FILM COATED ORAL at 10:12

## 2019-12-19 RX ADMIN — QUETIAPINE FUMARATE 50 MG: 25 TABLET ORAL at 09:12

## 2019-12-19 RX ADMIN — HEPARIN SODIUM 5000 UNITS: 5000 INJECTION, SOLUTION INTRAVENOUS; SUBCUTANEOUS at 03:12

## 2019-12-19 RX ADMIN — SEVELAMER CARBONATE 0.8 G: 800 POWDER, FOR SUSPENSION ORAL at 05:12

## 2019-12-19 RX ADMIN — CLONIDINE HYDROCHLORIDE 0.1 MG: 0.1 TABLET ORAL at 08:12

## 2019-12-19 RX ADMIN — SEVELAMER CARBONATE 0.8 G: 800 POWDER, FOR SUSPENSION ORAL at 11:12

## 2019-12-19 RX ADMIN — HEPARIN SODIUM 5000 UNITS: 5000 INJECTION, SOLUTION INTRAVENOUS; SUBCUTANEOUS at 06:12

## 2019-12-19 RX ADMIN — HYDRALAZINE HYDROCHLORIDE 50 MG: 50 TABLET, FILM COATED ORAL at 03:12

## 2019-12-19 RX ADMIN — INSULIN ASPART 1 UNITS: 100 INJECTION, SOLUTION INTRAVENOUS; SUBCUTANEOUS at 10:12

## 2019-12-19 RX ADMIN — INSULIN ASPART 4 UNITS: 100 INJECTION, SOLUTION INTRAVENOUS; SUBCUTANEOUS at 11:12

## 2019-12-19 RX ADMIN — HYDRALAZINE HYDROCHLORIDE 50 MG: 50 TABLET, FILM COATED ORAL at 06:12

## 2019-12-19 RX ADMIN — ATORVASTATIN CALCIUM 40 MG: 20 TABLET, FILM COATED ORAL at 08:12

## 2019-12-19 RX ADMIN — CLONIDINE HYDROCHLORIDE 0.1 MG: 0.1 TABLET ORAL at 10:12

## 2019-12-19 NOTE — SUBJECTIVE & OBJECTIVE
Interval History 12/19/2019:  Patient is seen for follow-up rehab evaluation and recommendations: Camera sitter at bedside.  No behavior issues overnight.  Participating with therapy.  Wants to go home.     HPI, Past Medical, Family, and Social History remains the same as documented in the initial encounter.    Scheduled Medications:    atorvastatin  40 mg Per OG tube Daily    cloNIDine  0.1 mg Oral BID    heparin (porcine)  5,000 Units Subcutaneous Q8H    hydrALAZINE  50 mg Oral Q8H    NIFEdipine  60 mg Oral Daily    polyethylene glycol  17 g Per NG tube Daily    QUEtiapine  50 mg Per OG tube BID    senna-docusate 8.6-50 mg  1 tablet Per OG tube BID    sevelamer carbonate  0.8 g Oral TID WM     PRN Medications: acetaminophen, albuterol sulfate, calcium gluconate IVPB, calcium gluconate IVPB, calcium gluconate IVPB, Dextrose 10% Bolus, Dextrose 10% Bolus, dextrose 50%, glucagon (human recombinant), glucose, glucose, haloperidol lactate, hydrALAZINE, hydrALAZINE, insulin aspart U-100, melatonin, senna-docusate 8.6-50 mg, sodium chloride 0.9%, Pharmacy to dose Vancomycin consult **AND** vancomycin - pharmacy to dose    Review of Systems   Constitutional: Positive for activity change. Negative for chills and fatigue.   HENT: Negative for trouble swallowing and voice change.    Eyes: Positive for visual disturbance (L eye blind since childhood). Negative for pain.   Respiratory: Negative for cough and shortness of breath.    Cardiovascular: Negative for chest pain and palpitations.   Gastrointestinal: Negative for nausea and vomiting.   Genitourinary: Positive for decreased urine volume. Negative for flank pain.   Musculoskeletal: Positive for gait problem. Negative for arthralgias and myalgias.   Skin: Positive for color change. Negative for wound.   Neurological: Positive for weakness. Negative for dizziness, numbness and headaches.   Psychiatric/Behavioral: Negative for agitation. The patient is not  nervous/anxious.      Objective:     Vital Signs (Most Recent):  Temp: 98.1 °F (36.7 °C) (12/19/19 1141)  Pulse: 77 (12/19/19 1141)  Resp: 18 (12/19/19 1141)  BP: (!) 168/77 (12/19/19 1141)  SpO2: 95 % (12/19/19 1141)    Vital Signs (24h Range):  Temp:  [97 °F (36.1 °C)-99.5 °F (37.5 °C)] 98.1 °F (36.7 °C)  Pulse:  [77-98] 77  Resp:  [14-20] 18  SpO2:  [95 %-99 %] 95 %  BP: (130-191)/(60-95) 168/77     Physical Exam   Constitutional: He is oriented to person, place, and time. He appears well-developed and well-nourished. No distress.   HENT:   Head: Normocephalic and atraumatic.   Right Ear: External ear normal.   Left Ear: External ear normal.   Nose: Nose normal.   Eyes: Right eye exhibits no discharge. Left eye exhibits no discharge. No scleral icterus.   Neck: Normal range of motion.   Cardiovascular: Normal rate and intact distal pulses.   Pulmonary/Chest: Effort normal. No respiratory distress.   Abdominal: Soft. He exhibits no distension. There is no tenderness.   Musculoskeletal: He exhibits no edema or tenderness.   Neurological: He is alert and oriented to person, place, and time. No sensory deficit. He exhibits normal muscle tone.   Skin: Skin is warm and dry. Bruising noted.   Psychiatric: He has a normal mood and affect. His behavior is normal.   Camera sitter at bedside   Vitals reviewed.    Diagnostic Results:   Labs: Reviewed  X-Ray: Reviewed  CT: Reviewed    NEUROLOGICAL EXAMINATION:     MENTAL STATUS   Oriented to person, place, and time.

## 2019-12-19 NOTE — PROGRESS NOTES
Pharmacokinetic Assessment Follow Up: IV Vancomycin    Vancomycin serum concentration assessment(s) and Plan:    -Vancomycin random level was drawn this morning ~ 8 hrs from the last dose and can be used to guide therapy.   -1 x dose of vancomycin 750 mg was given post-HD 12/18.   -Vancomycin will be given for a total of x 2 weeks (EOT now 12/30)  -Level returned above therapeutic range 15-20 mcg/mL @ 26.4. Holding dose today. Patient is not receiving HD today.   -Obtain daily random vancomycin levels.  -Will re-dose as appropriate per level and nephro recs. Anticipate re-dosing with 500 mg after next HD session.       Drug levels (last 3 results):  Recent Labs   Lab Result Units 12/18/19  0430 12/19/19  0443   Vancomycin, Random ug/mL 18.3 26.4       Pharmacy will continue to follow and monitor vancomycin.    Please contact pharmacy at extension [phone] for questions regarding this assessment.    Thank you for the consult,   Fan Irwin       Patient brief summary:  Balta Lamb is a 49 y.o. male initiated on antimicrobial therapy with IV Vancomycin for treatment of sepsis    The patient's current regimen is post-HD dosing of IV vancomycin    Drug Allergies:   Review of patient's allergies indicates:  No Known Allergies    Actual Body Weight:   69.6 kg     Renal Function:   Estimated Creatinine Clearance: 14.6 mL/min (A) (based on SCr of 5.9 mg/dL (H)).,     Dialysis Method (if applicable):  intermittent HD    CBC (last 72 hours):  Recent Labs   Lab Result Units 12/17/19  0507 12/18/19  0431 12/19/19  0443   WBC K/uL 4.83 5.37  5.24 5.19  5.12   Hemoglobin g/dL 8.6* 8.1*  8.1* 8.3*  8.2*   Hematocrit % 25.8* 24.6*  24.7* 25.9*  25.8*   Platelets K/uL 143* 159  156 173  181   Gran% % 69.0 64.9  65.9 61.8  63.7   Lymph% % 14.9* 19.2  18.9 22.7  21.7   Mono% % 12.6 11.7  11.1 10.4  10.7   Eosinophil% % 2.1 3.0  3.1 3.7  2.9   Basophil% % 0.4 0.6  0.4 0.4  0.4   Differential Method  Automated  Automated  Automated Automated  Automated       Metabolic Panel (last 72 hours):  Recent Labs   Lab Result Units 12/17/19  0507 12/18/19  0431 12/19/19  0443   Sodium mmol/L 144 143 140   Potassium mmol/L 3.7 3.6 4.1   Chloride mmol/L 103 104 106   CO2 mmol/L 24 24 24   Glucose mg/dL 99 135* 218*   BUN, Bld mg/dL 24* 32* 20   Creatinine mg/dL 5.8* 7.9* 5.9*   Albumin g/dL 3.3* 2.8* 2.9*   Total Bilirubin mg/dL 0.6 0.4 0.5   Alkaline Phosphatase U/L 88 84 86   AST U/L 36 37 43*   ALT U/L 28 36 48*   Magnesium mg/dL 2.1 2.3 2.1   Phosphorus mg/dL 4.2 5.3* 3.7       Vancomycin Administrations:  vancomycin given in the last 96 hours                   vancomycin 750 mg in dextrose 5 % 250 mL IVPB (ready to mix system) (mg) 750 mg New Bag 12/18/19 2114                Microbiologic Results:  Microbiology Results (last 7 days)     Procedure Component Value Units Date/Time    Blood culture [[id number]] Collected:  12/14/19 1220    Order Status:  Completed Specimen:  Blood from Peripheral, Foot, Left Updated:  12/18/19 1412     Blood Culture, Routine No Growth to date      No Growth to date      No Growth to date      No Growth to date      No Growth to date    Blood culture [[id number]] Collected:  12/14/19 1225    Order Status:  Completed Specimen:  Blood from Peripheral, Ankle, Right Updated:  12/18/19 1412     Blood Culture, Routine No Growth to date      No Growth to date      No Growth to date      No Growth to date      No Growth to date    Blood culture [[id number]] Collected:  12/11/19 2017    Order Status:  Completed Specimen:  Blood from Peripheral, Antecubital, Left Updated:  12/16/19 2212     Blood Culture, Routine No growth after 5 days.    Narrative:       Blood cultures x 2 different sites. 4 bottles total. Please  draw cultures before administering antibiotics.    Blood culture [[id number]] Collected:  12/11/19 2017    Order Status:  Completed Specimen:  Blood from Peripheral, Upper Arm, Left Updated:  12/16/19  2212     Blood Culture, Routine No growth after 5 days.    Narrative:       Blood cultures from 2 different sites. 4 bottles total.  Please draw before starting antibiotics.    Culture, Respiratory with Gram Stain [[id number]]  (Abnormal)  (Susceptibility) Collected:  12/11/19 2023    Order Status:  Completed Specimen:  Respiratory from Sputum, Induced Updated:  12/16/19 1011     Respiratory Culture No Pseudomonas isolated.      METHICILLIN RESISTANT STAPHYLOCOCCUS AUREUS  Few  Normal respiratory eliane also present       Gram Stain (Respiratory) <10 epithelial cells per low power field.     Gram Stain (Respiratory) Rare WBC's     Gram Stain (Respiratory) Few Gram positive cocci     Gram Stain (Respiratory) Rare Gram negative rods    Narrative:       Mini-BAL.    Culture, Anaerobe [[id number]]     Order Status:  No result Specimen:  Sputum     Aerobic culture [[id number]]     Order Status:  No result Specimen:  Sputum

## 2019-12-19 NOTE — PROGRESS NOTES
"              [facility] Hospital Medicine  Progress Note    Team: [facility]   Attending MD: Milana Xavier MD  Admit Date: 12/11/2019  [place]   Length of Stay:  LOS: 8 days   Code status: Full Code    Principal Problem: Altered mental status    Interval hx: alert and interactive today; appropriate and consents to rehab on discharge; no confusion overnight and eating well; sisters at bedside    ROS:  Constitutional: no fever or chills  Respiratory: no cough or shortness of breath  Cardiovascular: no chest pain or palpitations  Gastrointestinal: no nausea or vomiting, no abdominal pain; last BM: 12/17  Genitourinary: no hematuria or dysuria  Integument/Breast: no rash or pruritis  Musculoskeletal: no arthralgias or myalgias  Neurological: no seizures or tremors  Behavioral/Psych: no auditory or visual hallucinations      Physical Exam  Temp:  [97 °F (36.1 °C)-99.5 °F (37.5 °C)] 98.1 °F (36.7 °C)  Pulse:  [77-98] 77  Resp:  [14-20] 18  SpO2:  [95 %-98 %] 95 %  BP: (130-191)/(60-95) 168/77      Intake/Output Summary (Last 24 hours) at 12/19/2019 1411  Last data filed at 12/18/2019 1822  Gross per 24 hour   Intake 550 ml   Output 2550 ml   Net -2000 ml       Wt Readings from Last 1 Encounters:   12/18/19 0400 69.6 kg (153 lb 7 oz)   12/12/19 1205 77.6 kg (171 lb)   12/11/19 2217 77.6 kg (171 lb 1.2 oz)   12/11/19 1336 77.6 kg (171 lb)        Estimated body mass index is 22.02 kg/m² as calculated from the following:    Height as of this encounter: 5' 10" (1.778 m).    Weight as of this encounter: 69.6 kg (153 lb 7 oz).    General: well developed, well nourished, no distress  Lungs:  clear to auscultation bilaterally and normal respiratory effort.  Cardiovascular: Heart: regular rate and rhythm, S1, S2 normal, no murmur, click, rub or gallop. Chest Wall: no tenderness. Extremities: no cyanosis, no edema, no clubbing. + bruit to left UE  Abdomen/Rectal: Abdomen: soft, non-tender non-distended; " bowel sounds normal  Skin: Skin color, texture, turgor normal. No rashes or lesions.  Neurologic: Normal strength and tone. No focal numbness or weakness.  Psych/Behavioral:  Alert/awake and appropriate; O X 3      Recent Results (from the past 24 hour(s))   POCT glucose    Collection Time: 12/18/19  5:14 PM   Result Value Ref Range    POCT Glucose 127 (H) 70 - 110 mg/dL   POCT glucose    Collection Time: 12/18/19 10:12 PM   Result Value Ref Range    POCT Glucose 291 (H) 70 - 110 mg/dL   Comprehensive metabolic panel    Collection Time: 12/19/19  4:43 AM   Result Value Ref Range    Sodium 140 136 - 145 mmol/L    Potassium 4.1 3.5 - 5.1 mmol/L    Chloride 106 95 - 110 mmol/L    CO2 24 23 - 29 mmol/L    Glucose 218 (H) 70 - 110 mg/dL    BUN, Bld 20 6 - 20 mg/dL    Creatinine 5.9 (H) 0.5 - 1.4 mg/dL    Calcium 8.3 (L) 8.7 - 10.5 mg/dL    Total Protein 6.2 6.0 - 8.4 g/dL    Albumin 2.9 (L) 3.5 - 5.2 g/dL    Total Bilirubin 0.5 0.1 - 1.0 mg/dL    Alkaline Phosphatase 86 55 - 135 U/L    AST 43 (H) 10 - 40 U/L    ALT 48 (H) 10 - 44 U/L    Anion Gap 10 8 - 16 mmol/L    eGFR if African American 11.7 (A) >60 mL/min/1.73 m^2    eGFR if non  10.1 (A) >60 mL/min/1.73 m^2   CBC auto differential    Collection Time: 12/19/19  4:43 AM   Result Value Ref Range    WBC 5.19 3.90 - 12.70 K/uL    RBC 2.49 (L) 4.60 - 6.20 M/uL    Hemoglobin 8.3 (L) 14.0 - 18.0 g/dL    Hematocrit 25.9 (L) 40.0 - 54.0 %    Mean Corpuscular Volume 104 (H) 82 - 98 fL    Mean Corpuscular Hemoglobin 33.3 (H) 27.0 - 31.0 pg    Mean Corpuscular Hemoglobin Conc 32.0 32.0 - 36.0 g/dL    RDW 13.2 11.5 - 14.5 %    Platelets 173 150 - 350 K/uL    MPV 10.5 9.2 - 12.9 fL    Immature Granulocytes 1.0 (H) 0.0 - 0.5 %    Gran # (ANC) 3.2 1.8 - 7.7 K/uL    Immature Grans (Abs) 0.05 (H) 0.00 - 0.04 K/uL    Lymph # 1.2 1.0 - 4.8 K/uL    Mono # 0.5 0.3 - 1.0 K/uL    Eos # 0.2 0.0 - 0.5 K/uL    Baso # 0.02 0.00 - 0.20 K/uL    nRBC 0 0 /100 WBC    Gran% 61.8  38.0 - 73.0 %    Lymph% 22.7 18.0 - 48.0 %    Mono% 10.4 4.0 - 15.0 %    Eosinophil% 3.7 0.0 - 8.0 %    Basophil% 0.4 0.0 - 1.9 %    Platelet Estimate Appears normal     Aniso Slight     Poik Slight     Hypo Occasional     Ovalocytes Occasional     Differential Method Automated    Magnesium    Collection Time: 12/19/19  4:43 AM   Result Value Ref Range    Magnesium 2.1 1.6 - 2.6 mg/dL   Phosphorus    Collection Time: 12/19/19  4:43 AM   Result Value Ref Range    Phosphorus 3.7 2.7 - 4.5 mg/dL   CBC auto differential    Collection Time: 12/19/19  4:43 AM   Result Value Ref Range    WBC 5.12 3.90 - 12.70 K/uL    RBC 2.47 (L) 4.60 - 6.20 M/uL    Hemoglobin 8.2 (L) 14.0 - 18.0 g/dL    Hematocrit 25.8 (L) 40.0 - 54.0 %    Mean Corpuscular Volume 105 (H) 82 - 98 fL    Mean Corpuscular Hemoglobin 33.2 (H) 27.0 - 31.0 pg    Mean Corpuscular Hemoglobin Conc 31.8 (L) 32.0 - 36.0 g/dL    RDW 13.3 11.5 - 14.5 %    Platelets 181 150 - 350 K/uL    MPV 10.7 9.2 - 12.9 fL    Immature Granulocytes 0.6 (H) 0.0 - 0.5 %    Gran # (ANC) 3.3 1.8 - 7.7 K/uL    Immature Grans (Abs) 0.03 0.00 - 0.04 K/uL    Lymph # 1.1 1.0 - 4.8 K/uL    Mono # 0.6 0.3 - 1.0 K/uL    Eos # 0.2 0.0 - 0.5 K/uL    Baso # 0.02 0.00 - 0.20 K/uL    nRBC 0 0 /100 WBC    Gran% 63.7 38.0 - 73.0 %    Lymph% 21.7 18.0 - 48.0 %    Mono% 10.7 4.0 - 15.0 %    Eosinophil% 2.9 0.0 - 8.0 %    Basophil% 0.4 0.0 - 1.9 %    Differential Method Automated    Vancomycin, random    Collection Time: 12/19/19  4:43 AM   Result Value Ref Range    Vancomycin, Random 26.4 Not established ug/mL   POCT glucose    Collection Time: 12/19/19  7:49 AM   Result Value Ref Range    POCT Glucose 194 (H) 70 - 110 mg/dL   POCT glucose    Collection Time: 12/19/19 11:48 AM   Result Value Ref Range    POCT Glucose 244 (H) 70 - 110 mg/dL       Recent Labs   Lab 12/17/19  0507 12/18/19  0431 12/19/19  0443   WBC 4.83 5.37  5.24 5.19  5.12   HGB 8.6* 8.1*  8.1* 8.3*  8.2*   HCT 25.8* 24.6*  24.7*  25.9*  25.8*   * 159  156 173  181       Recent Labs   Lab 12/17/19  0507 12/18/19  0431 12/19/19  0443    143 140   K 3.7 3.6 4.1    104 106   CO2 24 24 24   BUN 24* 32* 20   CREATININE 5.8* 7.9* 5.9*   GLU 99 135* 218*   CALCIUM 8.6* 8.0* 8.3*   MG 2.1 2.3 2.1   PHOS 4.2 5.3* 3.7       Recent Labs   Lab 12/17/19  0507 12/18/19  0431 12/19/19  0443   ALKPHOS 88 84 86   ALT 28 36 48*   AST 36 37 43*   ALBUMIN 3.3* 2.8* 2.9*   PROT 6.3 5.7* 6.2   BILITOT 0.6 0.4 0.5       Recent Labs   Lab 12/18/19  0724 12/18/19  1215 12/18/19  1714 12/18/19  2212 12/19/19  0749 12/19/19  1148   POCTGLUCOSE 112* 213* 127* 291* 194* 244*       Hemoglobin A1C   Date Value Ref Range Status   12/11/2019 7.5 (H) 4.0 - 5.6 % Final     Comment:     ADA Screening Guidelines:  5.7-6.4%  Consistent with prediabetes  >or=6.5%  Consistent with diabetes  High levels of fetal hemoglobin interfere with the HbA1C  assay. Heterozygous hemoglobin variants (HbS, HgC, etc)do  not significantly interfere with this assay.   However, presence of multiple variants may affect accuracy.         Scheduled Meds:   [START ON 12/20/2019] sodium chloride 0.9%   Intravenous Once    atorvastatin  40 mg Per OG tube Daily    cloNIDine  0.1 mg Oral BID    heparin (porcine)  5,000 Units Subcutaneous Q8H    hydrALAZINE  50 mg Oral Q8H    NIFEdipine  60 mg Oral Daily    polyethylene glycol  17 g Per NG tube Daily    QUEtiapine  50 mg Per OG tube BID    senna-docusate 8.6-50 mg  1 tablet Per OG tube BID    sevelamer carbonate  0.8 g Oral TID WM       Continuous Infusions:    As Needed: acetaminophen, albuterol sulfate, calcium gluconate IVPB, calcium gluconate IVPB, calcium gluconate IVPB, Dextrose 10% Bolus, Dextrose 10% Bolus, dextrose 50%, glucagon (human recombinant), glucose, glucose, haloperidol lactate, hydrALAZINE, hydrALAZINE, insulin aspart U-100, melatonin, senna-docusate 8.6-50 mg, sodium chloride 0.9%, Pharmacy to dose  Vancomycin consult **AND** vancomycin - pharmacy to dose    Active Hospital Problems    Diagnosis  POA    *Altered mental status [R41.82]  Yes    Impaired mobility and ADLs [Z74.09]  No    MRSA (methicillin resistant Staphylococcus aureus) infection [A49.02]  Yes    Agitation [R45.1]  Unknown    Essential hypertension [I10]  Yes    ESRD (end stage renal disease) on dialysis [N18.6, Z99.2]  Not Applicable    Type 2 diabetes mellitus [E11.9]  Yes    Hypoglycemia, coma [E15]  Yes    Acute respiratory failure with hypoxia [J96.01]  Yes    Hyperkalemia [E87.5]  Yes    Metabolic acidosis [E87.2]  Yes    Hypoglycemia [E16.2]  Yes    Acute encephalopathy [G93.40]  Yes      Resolved Hospital Problems    Diagnosis Date Resolved POA    On mechanically assisted ventilation [Z99.11] 12/15/2019 Not Applicable         Assessment and Plan    Altered mental status due to hypoglycemia    49 year old male with HTN, DM2 and ESRD on HD who presented to OSH with acute onset L sided weakness and confusion.   - CT Head without at OSH unremarkable for acute infarction or hemorrhage   - CTA Head/neck - no acute change, no high grade stenosis  - SBP <200 mmHg  - EKG NSR , ECHO EF 53%  - SSI  - Q4 POCT Glucose checks  - MRI showed no acute intracranial abnormalities   12/17/2019: had an episode of hypoglycemia on 12/15 and glucoses > 150 prior; not on diabetic diet; maintain glucoses > 70 to ensure hypoglycemia not causing behavior changes; repeat CT as patient's sister states this behavior is similar to previous when he had head injury     Dysphagia    - advanced to diabetic/renal diet with thin liquids on 12/18  - Nepro tid    Agitation  Agitation 2/2 metabolic encephalopathy   -d/c EEG   -acute agitation with correction of glucose   - required fentanyl gtt, precedex in NCC  - recurrence of agitation/confusion on 12/16 with improvement after haldol/ativan  - continue scheduled quetiapine 50 BID; will slowly wean if stable on  12/20     MRSA infection -  in sputum  Started on zosyn, de-escalated to ceftriaxone 12/15  12/15 -felt to be colonization, but pt was on a ventilator.  willl treat with vanc w HD x 2 weeks - end date 12/30     Essential hypertension  - Hypertensive on admit,    - Goal SBP <200  - norvasc 5 po daily  - hydralazine 25 q 8 hours prn SBP > 180     ESRD (end stage renal disease) on dialysis  - HD fistula in LUE  - Nephrology following   - Renally dose medications  - Strict I/Os   - HD 12/16, scheduled MWF      Hypoglycemia, coma  - Present on admission  - OSH attempted to shift HyperK with insulin    - On arrival, pt with glucose <5  - D10 drip gtt stopped (12/12); resume as indicated  - on 12/12 Glucose (248)  - POCT glucose q4 hour and SSI   - Monitor CMP     Type 2 diabetes mellitus  - Home glipizide held  - on admit glucose <5, started on D10 drip d/t hypoglycemia   - D10 gtt stopped (12/12)   - (12/13) Glucose 131  - on low dose SSNI, POCT glucose Q AC and HS  -diabetic diet on 12/19 due to glucoses are rising      Diet: Diet renal Thin   DVT PPx:   VTE Risk Mitigation (From admission, onward)         Ordered     heparin (porcine) injection 5,000 Units  Every 8 hours      12/12/19 0949     IP VTE LOW RISK PATIENT  Once      12/11/19 1520     Place sequential compression device  Until discontinued      12/11/19 1520                Does patient have Capacity?  Yes  Goals- resume HD, pt repeated that he wants to die to nurses on 12/16 - new per sister; denies depression or having suicidal ideation on 12/19  Code Status- FULL  Pain management- well controlled  Prognosis-  fair  Family discussions-   12/17 - with sister who has not been familiar with his medical history recently but knew about trauma with brain injury in [place]; he is appropriate when they converse by phone.  Functional Status - PT/OT  Inpatient rehab recommended.    Discharge: Inpatient rehab - [facility]    Milana Xavier MD  [facility]  Medicine  [facility]  [phone]

## 2019-12-19 NOTE — PLAN OF CARE
Hemodialysis tx complete, 2L removed in a tx of 2.5 hours, tolerated well. Blood returned via LUIGI AVF, 15g needles removed x2, gauze and tape applied, pressure held to each site for 10 minutes, hemostasis achieved

## 2019-12-19 NOTE — PROGRESS NOTES
[facility]  Physical Medicine & Rehab  Progress Note    Patient Name: Balta Lamb  MRN: [id number]  Admission Date: 12/11/2019  Length of Stay: 8 days  Attending Physician: Milana Xavier MD    Subjective:     Principal Problem:Altered mental status    Hospital Course:   12/12/19:  Evaluated by OT.  Session limited 2/2 intubated/acuity.  Bed mobility and ADLs totalA.    12/15/19:  Participated with OT.  Evaluated by SLP.  SLP recommendation: NPO for dysphagia.  Bed mobility Bill.  Sit to stand Bill and transfers modA.  Grooming modA.  UBD and LBD maxA.  12/16/19:  Participated with SLP.  Upgraded recommendation: puree diet and thin liquids.  PT evaluation pending.  12/17/19:  Evaluated by PT.  Participated with OT.  Bed mobility SBA.  Sit to stand Bill.  Ambulated 3 steps modA.  Grooming CGA and LBD CGA.  12/18/19:  Participated with OT and SLP.  SLP upgraded recommendation: regular diet and thin liquids.  Bed mobility SBA-CGA.  Sit to stand Bill.  Ambulated ~20 ft modA.  Grooming CGA, UBD Bill, and LBD CGA.    Interval History 12/19/2019:  Patient is seen for follow-up rehab evaluation and recommendations: Camera sitter at bedside.  No behavior issues overnight.  Participating with therapy.  Wants to go home.     HPI, Past Medical, Family, and Social History remains the same as documented in the initial encounter.    Scheduled Medications:    atorvastatin  40 mg Per OG tube Daily    cloNIDine  0.1 mg Oral BID    heparin (porcine)  5,000 Units Subcutaneous Q8H    hydrALAZINE  50 mg Oral Q8H    NIFEdipine  60 mg Oral Daily    polyethylene glycol  17 g Per NG tube Daily    QUEtiapine  50 mg Per OG tube BID    senna-docusate 8.6-50 mg  1 tablet Per OG tube BID    sevelamer carbonate  0.8 g Oral TID WM     PRN Medications: acetaminophen, albuterol sulfate, calcium gluconate IVPB, calcium gluconate IVPB, calcium gluconate IVPB, Dextrose 10% Bolus, Dextrose 10% Bolus, dextrose 50%, glucagon  (human recombinant), glucose, glucose, haloperidol lactate, hydrALAZINE, hydrALAZINE, insulin aspart U-100, melatonin, senna-docusate 8.6-50 mg, sodium chloride 0.9%, Pharmacy to dose Vancomycin consult **AND** vancomycin - pharmacy to dose    Review of Systems   Constitutional: Positive for activity change. Negative for chills and fatigue.   HENT: Negative for trouble swallowing and voice change.    Eyes: Positive for visual disturbance (L eye blind since childhood). Negative for pain.   Respiratory: Negative for cough and shortness of breath.    Cardiovascular: Negative for chest pain and palpitations.   Gastrointestinal: Negative for nausea and vomiting.   Genitourinary: Positive for decreased urine volume. Negative for flank pain.   Musculoskeletal: Positive for gait problem. Negative for arthralgias and myalgias.   Skin: Positive for color change. Negative for wound.   Neurological: Positive for weakness. Negative for dizziness, numbness and headaches.   Psychiatric/Behavioral: Negative for agitation. The patient is not nervous/anxious.      Objective:     Vital Signs (Most Recent):  Temp: 98.1 °F (36.7 °C) (12/19/19 1141)  Pulse: 77 (12/19/19 1141)  Resp: 18 (12/19/19 1141)  BP: (!) 168/77 (12/19/19 1141)  SpO2: 95 % (12/19/19 1141)    Vital Signs (24h Range):  Temp:  [97 °F (36.1 °C)-99.5 °F (37.5 °C)] 98.1 °F (36.7 °C)  Pulse:  [77-98] 77  Resp:  [14-20] 18  SpO2:  [95 %-99 %] 95 %  BP: (130-191)/(60-95) 168/77     Physical Exam   Constitutional: He is oriented to person, place, and time. He appears well-developed and well-nourished. No distress.   HENT:   Head: Normocephalic and atraumatic.   Right Ear: External ear normal.   Left Ear: External ear normal.   Nose: Nose normal.   Eyes: Right eye exhibits no discharge. Left eye exhibits no discharge. No scleral icterus.   Neck: Normal range of motion.   Cardiovascular: Normal rate and intact distal pulses.   Pulmonary/Chest: Effort normal. No respiratory  distress.   Abdominal: Soft. He exhibits no distension. There is no tenderness.   Musculoskeletal: He exhibits no edema or tenderness.   Neurological: He is alert and oriented to person, place, and time. No sensory deficit. He exhibits normal muscle tone.   Skin: Skin is warm and dry. Bruising noted.   Psychiatric: He has a normal mood and affect. His behavior is normal.   Camera sitter at bedside   Vitals reviewed.    Diagnostic Results:   Labs: Reviewed  X-Ray: Reviewed  CT: Reviewed    Assessment/Plan:      * Altered mental status  -  Acute metabolic encephalopathy 2/2 hypoglycemia   -  CTA multiphase impression without acute hemorrhage, major vascular distribution infarct, or high-grade stenosis/major vessel occlusion  -  Camera sitter at bedside  -  Management per primary team    Hypoglycemia, coma  -  Hyperkalemia at [facility] with attempted shift with insulin  -  Hypoglycemic on arrival and started on D10 infusion  -  Acute metabolic encephalopathy 2/2 hypoglycemia  -  Management per primary team    ESRD (end stage renal disease) on dialysis  -  On HD  -  Nephrology following    Impaired mobility and ADLs  -  (I) with ADLs and mobility at baseline  -  Related to acute hospitalization, weakness, impaired self care skills, impaired balance, decreased coordination, decreased safety awareness, decreased ROM, impaired coordination, decreased upper extremity function, impaired functional mobilty, impaired endurance, impaired sensation, gait instability, impaired cognition, decreased lower extremity function, impaired fine motor  See hospital course for functional status.    Recommendations  -  Encourage mobility, OOB in chair, and early ambulation as appropriate  -  PT/OT evaluate and treat  -  Pain management  -  DVT prophylaxis if appropriate   -  Monitor for and prevent skin breakdown and pressure ulcers  · Early mobility, repositioning/weight shifting every 20-30 minutes when sitting, turn patient every 2 hours,  proper mattress/overlay and chair cushioning, pressure relief/heel protector boots    Post-acute recommendation: Inpatient Rehab     VIMAL Haney  Department of Physical Medicine & Rehab   [facility]

## 2019-12-19 NOTE — PLAN OF CARE
Goals remain appropriate.     Problem: Physical Therapy Goal  Goal: Physical Therapy Goal  Description  Goals to be met by: 2020     Patient will increase functional independence with mobility by performin. Supine to sit with Set-up [place]  2. Sit to supine with Set-up [place]  3. Sit to stand transfer with Contact Guard Assistance  4. Bed to chair transfer with Contact Guard Assistance using Rolling Walker  5. Gait  x 100 feet with Contact Guard Assistance using Rolling Walker.   6. Ascend/descend 3 stair with right Handrails Contact Guard Assistance   Outcome: Ongoing, Progressing

## 2019-12-19 NOTE — PT/OT/SLP PROGRESS
"Speech Language Pathology Treatment/  Discharge Summary    Patient Name:  Balta Lamb   MRN:  [id number]   7069/7069 A    Admitting Diagnosis: Altered mental status    Recommendations:                 General Recommendations:  Follow-up not indicated  Diet recommendations:  Regular, Liquid Diet Level: Thin   Aspiration Precautions: Standard aspiration precautions   General Precautions: Standard, fall  Communication strategies:  go to room if call light pushed    Subjective     "I feel good."     Pain/Comfort:  · Pain Rating 1: 0/10  · Pain Rating Post-Intervention 1: 0/10    Objective:     Has the patient been evaluated by SLP for swallowing?   Yes  Keep patient NPO? No   Current Respiratory Status: room air      Pt awake and alert upon entry seated fully upright in bedside chair. Two sisters at bedside. Pt observed with 4oz thin apple juice via multiple straw sips taken in isolation and as regular consistency solid liquid wash, ~2-3oz thin water via multiple straw sips taken as regular consistency solid liquid wash, and regular consistency solid brook cracker x1 via bite x2. Oral phase appeared WFL and overt clinical signs aspiration unappreciated. Education provided re: ongoing standard aspiration precautions and updated SLP POC. Encouragement provided to request SLP re-consult should pt experience swallowing changes. Pt and sisters verbalized understanding of education provided and agreement with SLP POC. White board updated. No further questions.     Assessment:     Balta Lamb is a 49 y.o. male without additional acute SLP needs at this time. Please re-consult should changes occur.     Goals:   Multidisciplinary Problems     SLP Goals     Not on file          Multidisciplinary Problems (Resolved)        Problem: SLP Goal    Goal Priority Disciplines Outcome   SLP Goal   (Resolved)     SLP Met   Description:  Speech Language Pathology Goals  Goals expected to be met by 12/23  1. Pt will tolerate puree & " thin liquids without s/s aspiration  2. Ongoing swallow assessment to determine safest & least restrictive PO consistencies    Speech Language Pathology Goals  Goals expected to be met by 12/22   1. Ongoing swallow assessment MET                     Plan:     · Patient to be seen:  4 x/week   · Plan of Care expires:  01/13/20  · Plan of Care reviewed with:  patient, sibling   · SLP Follow-Up:  No       Discharge recommendations:  rehabilitation facility     Time Tracking:     SLP Treatment Date:   12/19/19  Speech Start Time:  1122  Speech Stop Time:  1130     Speech Total Time (min):  8 min    Billable Minutes: Treatment Swallowing Dysfunction 8    REBECCA Vega, CCC-SLP  [phone]  12/19/2019

## 2019-12-19 NOTE — PT/OT/SLP PROGRESS
Physical Therapy Treatment    Patient Name:  Balta Lamb   MRN:  [id number]    Recommendations:     Discharge Recommendations:  rehabilitation facility   Discharge Equipment Recommendations: bath bench   Barriers to discharge: Inaccessible home and Decreased caregiver support    Assessment:     Balta Lamb is a 49 y.o. male admitted with a medical diagnosis of Altered mental status.  He presents with the following impairments/functional limitations:  weakness, impaired endurance, impaired self care skills, impaired functional mobilty, gait instability, impaired balance, impaired cognition, decreased coordination, decreased upper extremity function, decreased lower extremity function, decreased safety awareness, impaired cardiopulmonary response to activity. Pt tolerated session well with focus on bed mobility, transfers, and gait training. Pt with decreased safety awareness, visual scanning deficits, and impulsivity limiting pts safety and increasing his assistance needed. Pt ambulates with RW this day with improvement in stability over HHA and pts reported use of cane within home previously. Pt frustrated with hospital stay and stating that he will be safe in his home because he knows his home, therapeutic listening and counseling provided on importance of rehab to physical and functional improvement. Pt will continue to benefit from therapy services to address impairments listed above.     Rehab Prognosis: Good; patient would benefit from acute skilled PT services to address these deficits and reach maximum level of function.    Recent Surgery: * No surgery found *      Plan:     During this hospitalization, patient to be seen 3 x/week to address the identified rehab impairments via gait training, therapeutic activities, therapeutic exercises, neuromuscular re-education and progress toward the following goals:    · Plan of Care Expires:  01/11/20    Subjective     Chief Complaint: frustration with desire to  return home  Patient/Family Comments/goals: Pt does admit to falls at home with use of cane only as AD; family at bedside expresses concerns with pt progression and support as they will be leaving this day to return to [place] and are unsure if pts wife will be travelling to see him  Pain/Comfort:  · Pain Rating 1: 0/10  · Pain Rating Post-Intervention 1: 0/10      Objective:     Communicated with NSG prior to session.  Patient found left sidelying with telemetry upon PTA entry to room.     General Precautions: Standard, fall   Orthopedic Precautions:N/A   Braces: N/A     Functional Mobility:  · Bed Mobility:     · Supine to Sit: supervision  · Sit to Supine: supervision  · Transfers:     · Sit to Stand:  contact guard assistance with rolling walker  · Gait: Pt ambulates 10 ft HHA with Mod A to support lateral LOBs. Pt ambulates 24 ft x 2 trials with RW and Min A for balance and RW mgmt. Pt with decreased lateral instability with RW use. Mild FFP, downward gaze, and foot flat contact. Pt with visual scanning deficits, ambulating directly into several objects during both trials within room. Pt reports not seeing objects ahead of him. Seated rest between trials.       AM-PAC 6 CLICK MOBILITY  Turning over in bed (including adjusting bedclothes, sheets and blankets)?: 3  Sitting down on and standing up from a chair with arms (e.g., wheelchair, bedside commode, etc.): 3  Moving from lying on back to sitting on the side of the bed?: 3  Moving to and from a bed to a chair (including a wheelchair)?: 3  Need to walk in hospital room?: 3  Climbing 3-5 steps with a railing?: 1  Basic Mobility Total Score: 16       Therapeutic Activities and Exercises:   Pt assisted with functional mobility as noted above.   Therapeutic listening and education provided as noted in assessment.   Pt and family educated on PT POC and next anticipated visit.     Patient left supine with all lines intact, call button in reach and family  present..    GOALS:   Multidisciplinary Problems     Physical Therapy Goals        Problem: Physical Therapy Goal    Goal Priority Disciplines Outcome Goal Variances Interventions   Physical Therapy Goal     PT, PT/OT Ongoing, Progressing     Description:  Goals to be met by: 2020     Patient will increase functional independence with mobility by performin. Supine to sit with Set-up [place]  2. Sit to supine with Set-up [place]  3. Sit to stand transfer with Contact Guard Assistance  4. Bed to chair transfer with Contact Guard Assistance using Rolling Walker  5. Gait  x 100 feet with Contact Guard Assistance using Rolling Walker.   6. Ascend/descend 3 stair with right Handrails Contact Guard Assistance                    Time Tracking:     PT Received On: 19  PT Start Time: 1341     PT Stop Time: 1405  PT Total Time (min): 24 min     Billable Minutes: Gait Training 14 and Therapeutic Activity 10    Treatment Type: Treatment  PT/PTA: PTA     PTA Visit Number: 1     Elkin Trujillo, ANABEL  2019

## 2019-12-19 NOTE — PLAN OF CARE
Quiet hours. Slept well. VSS. No complaints. Blood sugar elevated, ss insulin given hs. Contact precautions maintained. Safety maintained.

## 2019-12-19 NOTE — PLAN OF CARE
Recommend ongoing regular consistency solid diet and thin liquids. No additional acute SLP needs at this time. Please re-consult should changes occur.     REBECCA Vega, CCC-SLP  [phone]  12/19/2019

## 2019-12-20 LAB
ALBUMIN SERPL BCP-MCNC: 3 G/DL (ref 3.5–5.2)
ALP SERPL-CCNC: 77 U/L (ref 55–135)
ALT SERPL W/O P-5'-P-CCNC: 34 U/L (ref 10–44)
ANION GAP SERPL CALC-SCNC: 8 MMOL/L (ref 8–16)
AST SERPL-CCNC: 24 U/L (ref 10–40)
BASOPHILS # BLD AUTO: 0.02 K/UL (ref 0–0.2)
BASOPHILS NFR BLD: 0.4 % (ref 0–1.9)
BILIRUB SERPL-MCNC: 0.4 MG/DL (ref 0.1–1)
BUN SERPL-MCNC: 33 MG/DL (ref 6–20)
CALCIUM SERPL-MCNC: 8 MG/DL (ref 8.7–10.5)
CHLORIDE SERPL-SCNC: 103 MMOL/L (ref 95–110)
CO2 SERPL-SCNC: 26 MMOL/L (ref 23–29)
CREAT SERPL-MCNC: 8.2 MG/DL (ref 0.5–1.4)
DIFFERENTIAL METHOD: ABNORMAL
EOSINOPHIL # BLD AUTO: 0.2 K/UL (ref 0–0.5)
EOSINOPHIL NFR BLD: 3 % (ref 0–8)
ERYTHROCYTE [DISTWIDTH] IN BLOOD BY AUTOMATED COUNT: 13.2 % (ref 11.5–14.5)
EST. GFR  (AFRICAN AMERICAN): 7.9 ML/MIN/1.73 M^2
EST. GFR  (NON AFRICAN AMERICAN): 6.8 ML/MIN/1.73 M^2
GLUCOSE SERPL-MCNC: 218 MG/DL (ref 70–110)
GLUCOSE SERPL-MCNC: 230 MG/DL (ref 70–110)
GLUCOSE SERPL-MCNC: 240 MG/DL (ref 70–110)
GLUCOSE SERPL-MCNC: 247 MG/DL (ref 70–110)
HCT VFR BLD AUTO: 25 % (ref 40–54)
HGB BLD-MCNC: 7.8 G/DL (ref 14–18)
IMM GRANULOCYTES # BLD AUTO: 0.04 K/UL (ref 0–0.04)
IMM GRANULOCYTES NFR BLD AUTO: 0.8 % (ref 0–0.5)
LYMPHOCYTES # BLD AUTO: 1.3 K/UL (ref 1–4.8)
LYMPHOCYTES NFR BLD: 23.7 % (ref 18–48)
MAGNESIUM SERPL-MCNC: 2.4 MG/DL (ref 1.6–2.6)
MCH RBC QN AUTO: 32.8 PG (ref 27–31)
MCHC RBC AUTO-ENTMCNC: 31.2 G/DL (ref 32–36)
MCV RBC AUTO: 105 FL (ref 82–98)
MONOCYTES # BLD AUTO: 0.6 K/UL (ref 0.3–1)
MONOCYTES NFR BLD: 10.4 % (ref 4–15)
NEUTROPHILS # BLD AUTO: 3.3 K/UL (ref 1.8–7.7)
NEUTROPHILS NFR BLD: 61.7 % (ref 38–73)
NRBC BLD-RTO: 0 /100 WBC
PHOSPHATE SERPL-MCNC: 4.3 MG/DL (ref 2.7–4.5)
PLATELET # BLD AUTO: 179 K/UL (ref 150–350)
PMV BLD AUTO: 10.7 FL (ref 9.2–12.9)
POCT GLUCOSE: 302 MG/DL (ref 70–110)
POTASSIUM SERPL-SCNC: 4.5 MMOL/L (ref 3.5–5.1)
PROT SERPL-MCNC: 6.1 G/DL (ref 6–8.4)
RBC # BLD AUTO: 2.38 M/UL (ref 4.6–6.2)
SODIUM SERPL-SCNC: 137 MMOL/L (ref 136–145)
VANCOMYCIN SERPL-MCNC: 23.8 UG/ML
WBC # BLD AUTO: 5.28 K/UL (ref 3.9–12.7)

## 2019-12-20 PROCEDURE — 11000001 HC ACUTE MED/SURG PRIVATE ROOM

## 2019-12-20 PROCEDURE — 25000003 PHARM REV CODE 250: Performed by: HOSPITALIST

## 2019-12-20 PROCEDURE — 83735 ASSAY OF MAGNESIUM: CPT

## 2019-12-20 PROCEDURE — 63600175 PHARM REV CODE 636 W HCPCS: Performed by: PSYCHIATRY & NEUROLOGY

## 2019-12-20 PROCEDURE — 97535 SELF CARE MNGMENT TRAINING: CPT

## 2019-12-20 PROCEDURE — 25000003 PHARM REV CODE 250: Performed by: INTERNAL MEDICINE

## 2019-12-20 PROCEDURE — 99232 SBSQ HOSP IP/OBS MODERATE 35: CPT | Mod: ,,, | Performed by: INTERNAL MEDICINE

## 2019-12-20 PROCEDURE — 80053 COMPREHEN METABOLIC PANEL: CPT

## 2019-12-20 PROCEDURE — 99232 PR SUBSEQUENT HOSPITAL CARE,LEVL II: ICD-10-PCS | Mod: ,,, | Performed by: INTERNAL MEDICINE

## 2019-12-20 PROCEDURE — 84100 ASSAY OF PHOSPHORUS: CPT

## 2019-12-20 PROCEDURE — 36415 COLL VENOUS BLD VENIPUNCTURE: CPT

## 2019-12-20 PROCEDURE — 80202 ASSAY OF VANCOMYCIN: CPT

## 2019-12-20 PROCEDURE — 25000003 PHARM REV CODE 250: Performed by: NURSE PRACTITIONER

## 2019-12-20 PROCEDURE — 63600175 PHARM REV CODE 636 W HCPCS: Performed by: INTERNAL MEDICINE

## 2019-12-20 PROCEDURE — 85025 COMPLETE CBC W/AUTO DIFF WBC: CPT

## 2019-12-20 RX ORDER — QUETIAPINE FUMARATE 25 MG/1
25 TABLET, FILM COATED ORAL NIGHTLY
Status: DISCONTINUED | OUTPATIENT
Start: 2019-12-20 | End: 2019-12-23 | Stop reason: HOSPADM

## 2019-12-20 RX ORDER — INSULIN ASPART 100 [IU]/ML
1-10 INJECTION, SOLUTION INTRAVENOUS; SUBCUTANEOUS
Status: DISCONTINUED | OUTPATIENT
Start: 2019-12-20 | End: 2019-12-22

## 2019-12-20 RX ORDER — SODIUM CHLORIDE 9 MG/ML
INJECTION, SOLUTION INTRAVENOUS ONCE
Status: COMPLETED | OUTPATIENT
Start: 2019-12-21 | End: 2019-12-21

## 2019-12-20 RX ADMIN — POLYETHYLENE GLYCOL (3350) 17 G: 17 POWDER, FOR SOLUTION ORAL at 09:12

## 2019-12-20 RX ADMIN — HYDRALAZINE HYDROCHLORIDE 50 MG: 50 TABLET, FILM COATED ORAL at 03:12

## 2019-12-20 RX ADMIN — SEVELAMER CARBONATE 0.8 G: 800 POWDER, FOR SUSPENSION ORAL at 05:12

## 2019-12-20 RX ADMIN — ATORVASTATIN CALCIUM 40 MG: 20 TABLET, FILM COATED ORAL at 09:12

## 2019-12-20 RX ADMIN — PANTOPRAZOLE SODIUM 40 MG: 40 TABLET, DELAYED RELEASE ORAL at 09:12

## 2019-12-20 RX ADMIN — INSULIN ASPART 4 UNITS: 100 INJECTION, SOLUTION INTRAVENOUS; SUBCUTANEOUS at 06:12

## 2019-12-20 RX ADMIN — QUETIAPINE FUMARATE 25 MG: 25 TABLET ORAL at 09:12

## 2019-12-20 RX ADMIN — HEPARIN SODIUM 5000 UNITS: 5000 INJECTION, SOLUTION INTRAVENOUS; SUBCUTANEOUS at 09:12

## 2019-12-20 RX ADMIN — CLONIDINE HYDROCHLORIDE 0.1 MG: 0.1 TABLET ORAL at 09:12

## 2019-12-20 RX ADMIN — SEVELAMER CARBONATE 0.8 G: 800 POWDER, FOR SUSPENSION ORAL at 12:12

## 2019-12-20 RX ADMIN — SENNOSIDES AND DOCUSATE SODIUM 1 TABLET: 8.6; 5 TABLET ORAL at 09:12

## 2019-12-20 RX ADMIN — HYDRALAZINE HYDROCHLORIDE 50 MG: 50 TABLET, FILM COATED ORAL at 06:12

## 2019-12-20 RX ADMIN — HEPARIN SODIUM 5000 UNITS: 5000 INJECTION, SOLUTION INTRAVENOUS; SUBCUTANEOUS at 06:12

## 2019-12-20 RX ADMIN — INSULIN ASPART 4 UNITS: 100 INJECTION, SOLUTION INTRAVENOUS; SUBCUTANEOUS at 09:12

## 2019-12-20 RX ADMIN — HYDRALAZINE HYDROCHLORIDE 50 MG: 50 TABLET, FILM COATED ORAL at 09:12

## 2019-12-20 RX ADMIN — NIFEDIPINE 60 MG: 60 TABLET, FILM COATED, EXTENDED RELEASE ORAL at 09:12

## 2019-12-20 RX ADMIN — INSULIN ASPART 2 UNITS: 100 INJECTION, SOLUTION INTRAVENOUS; SUBCUTANEOUS at 12:12

## 2019-12-20 RX ADMIN — SEVELAMER CARBONATE 0.8 G: 800 POWDER, FOR SUSPENSION ORAL at 09:12

## 2019-12-20 RX ADMIN — INSULIN ASPART 2 UNITS: 100 INJECTION, SOLUTION INTRAVENOUS; SUBCUTANEOUS at 09:12

## 2019-12-20 RX ADMIN — HEPARIN SODIUM 5000 UNITS: 5000 INJECTION, SOLUTION INTRAVENOUS; SUBCUTANEOUS at 03:12

## 2019-12-20 NOTE — PT/OT/SLP PROGRESS
Occupational Therapy   Treatment    Name: Balta Lamb  MRN: [id number]  Admitting Diagnosis:  Altered mental status       Recommendations:     Discharge Recommendations: rehabilitation facility  Discharge Equipment Recommendations:  bath bench  Barriers to discharge:  Inaccessible home environment, Decreased caregiver support    Assessment:     Balta Lamb is a 49 y.o. male with a medical diagnosis of Altered mental status.  He presents alone in room supine and requesting a shower.  Gathered items required with pt with poor safety awareness with impulsivity and reminders for safety and following directions. A large water spill occurred in the bathroom and into the bedroom and pt impulsivity a large safety concern as pt not aware and not focused on verbal warning and pt with poor balance for independent mobility.   Pt walking though ankle deep water while therapist provided verbal cues and close min A for safe transfer to bathroom and shower.  Able to wash self with constant supervision for safety as pt repeatedly stood up quickly and with poor vision for ensuring knowledge of surroundings. Pt abl eto don socks while seated and assist with donning gown for return to bed as pt fatigued.  Pt pleased with shower and did well with task but shows poor attention, sequencing and safety awareness for safe performance.  Spoke with sisters who states very limited assist at his home.  Performance deficits affecting function are weakness, impaired endurance, impaired self care skills, impaired functional mobilty, gait instability, impaired balance, visual deficits, impaired cognition, decreased coordination.     Rehab Prognosis:  Good; patient would benefit from acute skilled OT services to address these deficits and reach maximum level of function.       Plan:     Patient to be seen 4 x/week to address the above listed problems via self-care/home management, therapeutic activities, therapeutic exercises  · Plan of Care  Expires: 01/09/20  · Plan of Care Reviewed with: patient, sibling    Subjective     Pain/Comfort:       Objective:     Communicated with: RN prior to session.  Patient found supine with   upon OT entry to room.    General Precautions: Standard, fall   Orthopedic Precautions:N/A   Braces: N/A     Occupational Performance:     Bed Mobility:    · Pt CGA and verbal cues for safe bed mobility     Functional Mobility/Transfers:  · Functional Mobility: Close CGA/Min A for safe in room mobility     Activities of Daily Living:  · Pt able to complete dressing and bathing for bathing task with min A and verbal cues for safety       AMPA 6 Click ADL: 19    Treatment & Education:  Pt educated on safety, role of OT, importance of increased participation in self care for gains , expectations for participation, expectations for gains, POC, energy conservation, caregiver strain. White board updated.   - ADL training  - Safety training     Patient left supine with all lines intact and 2 sisters presentEducation:      GOALS:   Multidisciplinary Problems     Occupational Therapy Goals        Problem: Occupational Therapy Goal    Goal Priority Disciplines Outcome Interventions   Occupational Therapy Goal     OT, PT/OT Ongoing, Progressing    Description:  Goals set 12/15 to be addressed for 14 days with expiration date, 12/29:  Patient will increase functional independence with ADLs by performing:    Patient will demonstrate rolling to the right with supervision.     Patient will demonstrate rolling to the left with supervision.  Patient will demonstrate supine -sit with supervision.     Patient will demonstrate stand pivot transfers with min assist.     Patient will demonstrate grooming while seated with setup (A).  Patient will demonstrate upper body dressing with min assist while seated EOB.   Progressing 12/17  Patient will demonstrate lower body dressing with min assist while seated EOB.     Patient will demonstrate toileting with  min assist.     Patient will demonstrate bathing while seated EOB with min assist.     Patient's family / caregiver will demonstrate independence and safety with assisting patient with self-care skills and functional mobility.                               Time Tracking:     OT Date of Treatment: 12/20/19  OT Start Time: 1000  OT Stop Time: 1055  OT Total Time (min): 55 min    Billable Minutes:Self Care/Home Management 55    Stephanie Weinberg, OT  12/20/2019

## 2019-12-20 NOTE — PROGRESS NOTES
Pharmacokinetic Assessment Follow Up: IV Vancomycin    Vancomycin serum concentration assessment(s) and Plan:    · Vancomycin random level resulted as 23.8 mcg/mL and can be used to guide therapy at this time.   · The measurement is above the desired definitive target range of 15 to 20 mcg/mL.   · Holding dose today.  Patient is not receiving HD today but is scheduled for 12/21 am.   · 1 x dose of vancomycin 750 mg was given post-HD 12/18/19.   · Vancomycin will be given for a total of x 2 weeks (EOT now 12/30)  · Obtain daily random vancomycin levels.  · Will re-dose as appropriate per level and nephro recs.  Anticipate re-dosing with 500 mg after next HD session.     Drug levels (last 3 results):  Recent Labs   Lab Result Units 12/18/19  0430 12/19/19  0443 12/20/19  0452   Vancomycin, Random ug/mL 18.3 26.4 23.8       Pharmacy will continue to follow and monitor vancomycin.    Please contact pharmacy at extension [phone] for questions regarding this assessment.    Thank you for the consult,   Barbara Morris       Patient brief summary:  Balta Lamb is a 49 y.o. male initiated on antimicrobial therapy with IV Vancomycin for treatment of MRSA infection -  in sputum.    Drug Allergies:   Review of patient's allergies indicates:  No Known Allergies    Actual Body Weight:   69.6 kg    Renal Function:   Estimated Creatinine Clearance: 10.5 mL/min (A) (based on SCr of 8.2 mg/dL (H)).,     Dialysis Method (if applicable):  intermittent HD    CBC (last 72 hours):  Recent Labs   Lab Result Units 12/18/19  0431 12/19/19  0443 12/20/19  0452   WBC K/uL 5.37  5.24 5.19  5.12 5.28   Hemoglobin g/dL 8.1*  8.1* 8.3*  8.2* 7.8*   Hematocrit % 24.6*  24.7* 25.9*  25.8* 25.0*   Platelets K/uL 159  156 173  181 179   Gran% % 64.9  65.9 61.8  63.7 61.7   Lymph% % 19.2  18.9 22.7  21.7 23.7   Mono% % 11.7  11.1 10.4  10.7 10.4   Eosinophil% % 3.0  3.1 3.7  2.9 3.0   Basophil% % 0.6  0.4 0.4  0.4 0.4   Differential  Method  Automated  Automated Automated  Automated Automated       Metabolic Panel (last 72 hours):  Recent Labs   Lab Result Units 12/18/19  0431 12/19/19  0443 12/20/19  0452   Sodium mmol/L 143 140 137   Potassium mmol/L 3.6 4.1 4.5   Chloride mmol/L 104 106 103   CO2 mmol/L 24 24 26   Glucose mg/dL 135* 218* 247*   BUN, Bld mg/dL 32* 20 33*   Creatinine mg/dL 7.9* 5.9* 8.2*   Albumin g/dL 2.8* 2.9* 3.0*   Total Bilirubin mg/dL 0.4 0.5 0.4   Alkaline Phosphatase U/L 84 86 77   AST U/L 37 43* 24   ALT U/L 36 48* 34   Magnesium mg/dL 2.3 2.1 2.4   Phosphorus mg/dL 5.3* 3.7 4.3       Vancomycin Administrations:  vancomycin given in the last 96 hours                   vancomycin 750 mg in dextrose 5 % 250 mL IVPB (ready to mix system) (mg) 750 mg New Bag 12/18/19 2114                Microbiologic Results:  Microbiology Results (last 7 days)     Procedure Component Value Units Date/Time    Blood culture [[id number]] Collected:  12/14/19 1220    Order Status:  Completed Specimen:  Blood from Peripheral, Foot, Left Updated:  12/19/19 1412     Blood Culture, Routine No growth after 5 days.    Blood culture [[id number]] Collected:  12/14/19 1225    Order Status:  Completed Specimen:  Blood from Peripheral, Ankle, Right Updated:  12/19/19 1412     Blood Culture, Routine No growth after 5 days.    Blood culture [[id number]] Collected:  12/11/19 2017    Order Status:  Completed Specimen:  Blood from Peripheral, Antecubital, Left Updated:  12/16/19 2212     Blood Culture, Routine No growth after 5 days.    Narrative:       Blood cultures x 2 different sites. 4 bottles total. Please  draw cultures before administering antibiotics.    Blood culture [[id number]] Collected:  12/11/19 2017    Order Status:  Completed Specimen:  Blood from Peripheral, Upper Arm, Left Updated:  12/16/19 2212     Blood Culture, Routine No growth after 5 days.    Narrative:       Blood cultures from 2 different sites. 4 bottles total.  Please draw before  starting antibiotics.    Culture, Respiratory with Gram Stain [[id number]]  (Abnormal)  (Susceptibility) Collected:  12/11/19 2023    Order Status:  Completed Specimen:  Respiratory from Sputum, Induced Updated:  12/16/19 1011     Respiratory Culture No Pseudomonas isolated.      METHICILLIN RESISTANT STAPHYLOCOCCUS AUREUS  Few  Normal respiratory eliane also present       Gram Stain (Respiratory) <10 epithelial cells per low power field.     Gram Stain (Respiratory) Rare WBC's     Gram Stain (Respiratory) Few Gram positive cocci     Gram Stain (Respiratory) Rare Gram negative rods    Narrative:       Mini-BAL.    Culture, Anaerobe [[id number]]     Order Status:  No result Specimen:  Sputum     Aerobic culture [[id number]]     Order Status:  No result Specimen:  Sputum

## 2019-12-20 NOTE — PROGRESS NOTES
"              [facility] Medicine  Progress Note    Team: [facility]   Attending MD: Milana Xavier MD  Admit Date: 12/11/2019  [place]   Length of Stay:  LOS: 9 days   Code status: Full Code    Principal Problem: Altered mental status    Interval hx: alert and interactive today; now does not want to go to rehab as he would like to travel to [place] with his family for his daughter's vernon; glucoses now rising and suspect patient had prolonged hypoglycemia from glipizide    ROS:  Constitutional: no fever or chills  Respiratory: no cough or shortness of breath  Cardiovascular: no chest pain or palpitations  Gastrointestinal: no nausea or vomiting, no abdominal pain; last BM: 12/17  Genitourinary: no hematuria or dysuria  Integument/Breast: no rash or pruritis  Musculoskeletal: no arthralgias or myalgias  Neurological: no seizures or tremors  Behavioral/Psych: no auditory or visual hallucinations      Physical Exam  Temp:  [96.5 °F (35.8 °C)-98.6 °F (37 °C)] 97 °F (36.1 °C)  Pulse:  [69-83] 75  Resp:  [14-18] 18  SpO2:  [95 %-100 %] 100 %  BP: (146-181)/(67-79) 180/79    No intake or output data in the 24 hours ending 12/20/19 1638    Wt Readings from Last 1 Encounters:   12/18/19 0400 69.6 kg (153 lb 7 oz)   12/12/19 1205 77.6 kg (171 lb)   12/11/19 2217 77.6 kg (171 lb 1.2 oz)   12/11/19 1336 77.6 kg (171 lb)        Estimated body mass index is 22.02 kg/m² as calculated from the following:    Height as of this encounter: 5' 10" (1.778 m).    Weight as of this encounter: 69.6 kg (153 lb 7 oz).    General: well developed, well nourished, no distress  Lungs:  clear to auscultation bilaterally and normal respiratory effort.  Cardiovascular: Heart: regular rate and rhythm, S1, S2 normal, no murmur, click, rub or gallop. Chest Wall: no tenderness. Extremities: no cyanosis, no edema, no clubbing. + bruit to left UE  Abdomen/Rectal: Abdomen: soft, non-tender non-distended; bowel sounds " normal  Skin: Skin color, texture, turgor normal. No rashes or lesions.  Neurologic: Normal strength and tone. No focal numbness or weakness.  Psych/Behavioral:  Alert/awake and appropriate; O X 3      Recent Results (from the past 24 hour(s))   POCT glucose    Collection Time: 12/19/19  5:13 PM   Result Value Ref Range    POCT Glucose 265 (H) 70 - 110 mg/dL   POCT glucose    Collection Time: 12/19/19  9:58 PM   Result Value Ref Range    POCT Glucose 286 (H) 70 - 110 mg/dL   Vancomycin, random    Collection Time: 12/20/19  4:52 AM   Result Value Ref Range    Vancomycin, Random 23.8 Not established ug/mL   Phosphorus    Collection Time: 12/20/19  4:52 AM   Result Value Ref Range    Phosphorus 4.3 2.7 - 4.5 mg/dL   Magnesium    Collection Time: 12/20/19  4:52 AM   Result Value Ref Range    Magnesium 2.4 1.6 - 2.6 mg/dL   CBC auto differential    Collection Time: 12/20/19  4:52 AM   Result Value Ref Range    WBC 5.28 3.90 - 12.70 K/uL    RBC 2.38 (L) 4.60 - 6.20 M/uL    Hemoglobin 7.8 (L) 14.0 - 18.0 g/dL    Hematocrit 25.0 (L) 40.0 - 54.0 %    Mean Corpuscular Volume 105 (H) 82 - 98 fL    Mean Corpuscular Hemoglobin 32.8 (H) 27.0 - 31.0 pg    Mean Corpuscular Hemoglobin Conc 31.2 (L) 32.0 - 36.0 g/dL    RDW 13.2 11.5 - 14.5 %    Platelets 179 150 - 350 K/uL    MPV 10.7 9.2 - 12.9 fL    Immature Granulocytes 0.8 (H) 0.0 - 0.5 %    Gran # (ANC) 3.3 1.8 - 7.7 K/uL    Immature Grans (Abs) 0.04 0.00 - 0.04 K/uL    Lymph # 1.3 1.0 - 4.8 K/uL    Mono # 0.6 0.3 - 1.0 K/uL    Eos # 0.2 0.0 - 0.5 K/uL    Baso # 0.02 0.00 - 0.20 K/uL    nRBC 0 0 /100 WBC    Gran% 61.7 38.0 - 73.0 %    Lymph% 23.7 18.0 - 48.0 %    Mono% 10.4 4.0 - 15.0 %    Eosinophil% 3.0 0.0 - 8.0 %    Basophil% 0.4 0.0 - 1.9 %    Differential Method Automated    Comprehensive metabolic panel    Collection Time: 12/20/19  4:52 AM   Result Value Ref Range    Sodium 137 136 - 145 mmol/L    Potassium 4.5 3.5 - 5.1 mmol/L    Chloride 103 95 - 110 mmol/L    CO2 26  23 - 29 mmol/L    Glucose 247 (H) 70 - 110 mg/dL    BUN, Bld 33 (H) 6 - 20 mg/dL    Creatinine 8.2 (H) 0.5 - 1.4 mg/dL    Calcium 8.0 (L) 8.7 - 10.5 mg/dL    Total Protein 6.1 6.0 - 8.4 g/dL    Albumin 3.0 (L) 3.5 - 5.2 g/dL    Total Bilirubin 0.4 0.1 - 1.0 mg/dL    Alkaline Phosphatase 77 55 - 135 U/L    AST 24 10 - 40 U/L    ALT 34 10 - 44 U/L    Anion Gap 8 8 - 16 mmol/L    eGFR if African American 7.9 (A) >60 mL/min/1.73 m^2    eGFR if non  6.8 (A) >60 mL/min/1.73 m^2       Recent Labs   Lab 12/18/19 0431 12/19/19 0443 12/20/19  0452   WBC 5.37  5.24 5.19  5.12 5.28   HGB 8.1*  8.1* 8.3*  8.2* 7.8*   HCT 24.6*  24.7* 25.9*  25.8* 25.0*     156 173  181 179       Recent Labs   Lab 12/18/19  0431 12/19/19  0443 12/20/19  0452    140 137   K 3.6 4.1 4.5    106 103   CO2 24 24 26   BUN 32* 20 33*   CREATININE 7.9* 5.9* 8.2*   * 218* 247*   CALCIUM 8.0* 8.3* 8.0*   MG 2.3 2.1 2.4   PHOS 5.3* 3.7 4.3       Recent Labs   Lab 12/18/19  0431 12/19/19  0443 12/20/19  0452   ALKPHOS 84 86 77   ALT 36 48* 34   AST 37 43* 24   ALBUMIN 2.8* 2.9* 3.0*   PROT 5.7* 6.2 6.1   BILITOT 0.4 0.5 0.4       Recent Labs   Lab 12/18/19  1714 12/18/19  2212 12/19/19  0749 12/19/19  1148 12/19/19  1713 12/19/19  2158   POCTGLUCOSE 127* 291* 194* 244* 265* 286*       Hemoglobin A1C   Date Value Ref Range Status   12/11/2019 7.5 (H) 4.0 - 5.6 % Final     Comment:     ADA Screening Guidelines:  5.7-6.4%  Consistent with prediabetes  >or=6.5%  Consistent with diabetes  High levels of fetal hemoglobin interfere with the HbA1C  assay. Heterozygous hemoglobin variants (HbS, HgC, etc)do  not significantly interfere with this assay.   However, presence of multiple variants may affect accuracy.         Scheduled Meds:   sodium chloride 0.9%   Intravenous Once    [START ON 12/21/2019] sodium chloride 0.9%   Intravenous Once    atorvastatin  40 mg Oral Daily    cloNIDine  0.1 mg Oral BID     heparin (porcine)  5,000 Units Subcutaneous Q8H    hydrALAZINE  50 mg Oral Q8H    NIFEdipine  60 mg Oral Daily    pantoprazole  40 mg Oral Daily    polyethylene glycol  17 g Oral Daily    QUEtiapine  25 mg Oral Daily    QUEtiapine  50 mg Oral QHS    senna-docusate 8.6-50 mg  1 tablet Oral BID    sevelamer carbonate  0.8 g Oral TID WM       Continuous Infusions:    As Needed: acetaminophen, albuterol sulfate, Dextrose 10% Bolus, Dextrose 10% Bolus, dextrose 50%, famotidine, glucagon (human recombinant), glucose, glucose, haloperidol lactate, hydrALAZINE, hydrALAZINE, insulin aspart U-100, melatonin, senna-docusate 8.6-50 mg, sodium chloride 0.9%, Pharmacy to dose Vancomycin consult **AND** vancomycin - pharmacy to dose    Active Hospital Problems    Diagnosis  POA    *Altered mental status [R41.82]  Yes    Impaired mobility and ADLs [Z74.09]  No    MRSA (methicillin resistant Staphylococcus aureus) infection [A49.02]  Yes    Agitation [R45.1]  Unknown    Essential hypertension [I10]  Yes    ESRD (end stage renal disease) on dialysis [N18.6, Z99.2]  Not Applicable    Type 2 diabetes mellitus [E11.9]  Yes    Hypoglycemia, coma [E15]  Yes    Acute respiratory failure with hypoxia [J96.01]  Yes    Hyperkalemia [E87.5]  Yes    Metabolic acidosis [E87.2]  Yes    Hypoglycemia [E16.2]  Yes    Acute encephalopathy [G93.40]  Yes      Resolved Hospital Problems    Diagnosis Date Resolved POA    On mechanically assisted ventilation [Z99.11] 12/15/2019 Not Applicable         Assessment and Plan    Altered mental status due to hypoglycemia    49 year old male with HTN, DM2 and ESRD on HD who presented to OSH with acute onset L sided weakness and confusion.   - CT Head without at OSH unremarkable for acute infarction or hemorrhage   - CTA Head/neck - no acute change, no high grade stenosis  - SBP <200 mmHg  - EKG NSR , ECHO EF 53%  - SSI  - Q4 POCT Glucose checks  - MRI showed no acute intracranial  abnormalities   12/17/2019: had an episode of hypoglycemia on 12/15 and glucoses > 150 prior; not on diabetic diet; maintain glucoses > 70 to ensure hypoglycemia not causing behavior changes; repeat CT as patient's sister states this behavior is similar to previous when he had head injury     Dysphagia    - advanced to diabetic/renal diet with thin liquids on 12/18  - Nepro tid    Agitation  Agitation 2/2 metabolic encephalopathy   -d/c EEG   -acute agitation with correction of glucose   - required fentanyl gtt, precedex in NCC  - recurrence of agitation/confusion on 12/16 with improvement after haldol/ativan  - continue scheduled quetiapine 50 BID; will slowly wean if stable on 12/20     MRSA infection -  in sputum  Started on zosyn, de-escalated to ceftriaxone 12/15  12/15 -felt to be colonization, but pt was on a ventilator.  willl treat with vanc w HD x 2 weeks - end date 12/30     Essential hypertension  - Hypertensive on admit,    - Goal SBP <200  - norvasc 5 po daily  - hydralazine 25 q 8 hours prn SBP > 180     ESRD (end stage renal disease) on dialysis  - HD fistula in LUE  - Nephrology following   - Renally dose medications  - Strict I/Os   - HD 12/16, scheduled MWF      Hypoglycemia, coma  - Present on admission  - OSH attempted to shift HyperK with insulin    - On arrival, pt with glucose <5  - D10 drip gtt stopped (12/12); resume as indicated  - on 12/12 Glucose (248)  - POCT glucose q4 hour and SSI   - Monitor CMP     Type 2 diabetes mellitus  - Home glipizide held; likely related to hypoglycemia  - on admit glucose <5, started on D10 drip d/t hypoglycemia   - D10 gtt stopped (12/12)   - (12/13) Glucose 131  - on low dose SSNI, POCT glucose Q AC and HS  -diabetic diet on 12/19 due to glucoses are rising  -increase SSNI to moderate dose; will have endocrine evaluate for safest home medication      Diet: Diet diabetic [facility]; 2000 Calorie; Renal; Thin   DVT PPx:   VTE Risk Mitigation  (From admission, onward)         Ordered     heparin (porcine) injection 5,000 Units  Every 8 hours      12/12/19 0949     IP VTE LOW RISK PATIENT  Once      12/11/19 1520     Place sequential compression device  Until discontinued      12/11/19 1520                Does patient have Capacity?  Yes  Goals- resume HD, pt repeated that he wants to die to nurses on 12/16 - new per sister; denies depression or having suicidal ideation on 12/19  Code Status- FULL  Pain management- well controlled  Prognosis-  fair  Family discussions-   12/17 - with sister who has not been familiar with his medical history recently but knew about trauma with brain injury in [place]; he is appropriate when they converse by phone.  Functional Status - PT/OT  Inpatient rehab recommended.    Discharge: Inpatient rehab recommended but patient wishes to go home for travel; discharge once medically stable    Milana Xavier MD  [facility] Medicine  [facility]  [phone]

## 2019-12-20 NOTE — PLAN OF CARE
Goals addressed and unmet.  Cont with POC  Stephanie Weinberg, KALYN  12/20/2019    Problem: Occupational Therapy Goal  Goal: Occupational Therapy Goal  Description  Goals set 12/15 to be addressed for 14 days with expiration date, 12/29:  Patient will increase functional independence with ADLs by performing:    Patient will demonstrate rolling to the right with supervision.     Patient will demonstrate rolling to the left with supervision.  Patient will demonstrate supine -sit with supervision.     Patient will demonstrate stand pivot transfers with min assist.     Patient will demonstrate grooming while seated with setup (A).  Patient will demonstrate upper body dressing with min assist while seated EOB.   Progressing 12/17  Patient will demonstrate lower body dressing with min assist while seated EOB.     Patient will demonstrate toileting with min assist.     Patient will demonstrate bathing while seated EOB with min assist.     Patient's family / caregiver will demonstrate independence and safety with assisting patient with self-care skills and functional mobility.              Outcome: Ongoing, Progressing

## 2019-12-20 NOTE — PLAN OF CARE
Patient's vital signs remained stable throughout the day.  He had no complaints of pain.  Both existing IV's failed; new IV started in right thumb.

## 2019-12-20 NOTE — NURSING
Patient is lying in bed AAOx4 with no complaint of pain or discomfort. Bed is low and locked, and call light is within reach.  Will continue to monitor until arrival of day shift.

## 2019-12-20 NOTE — PLAN OF CARE
12/20/19 1216   Post-Acute Status   Post-Acute Authorization Other     Pt and wife have declined inpatient rehab. Pt and wife reported they will return home to [place] and resume care there. SW will continue to follow and assist with needs as indicated.    Tara Lynn, JEN  [facility]  [phone]

## 2019-12-20 NOTE — PLAN OF CARE
Acceptance to [facility] was pending authorization for admit this Sunday, however, patient is declining inpatient rehab services.   Geeta ([phone]) with [facility] notified of patient's decision.   Potential discharge home this weekend with his wife.     12/20/19 1240   Discharge Reassessment   Assessment Type Discharge Planning Reassessment   Discharge Plan A Rehab   Discharge Plan B Home with family;Home Health   DME Needed Upon Discharge    (TBD)   Anticipated Discharge Disposition Home-Health   Post-Acute Status   Post-Acute Authorization Home Health/Hospice   Post-Acute Placement Status Awaiting Internal Medical Clearance   Home Health/Hospice Status Awaiting Internal Medical Clearance

## 2019-12-21 LAB
ALBUMIN SERPL BCP-MCNC: 3.2 G/DL (ref 3.5–5.2)
ANION GAP SERPL CALC-SCNC: 11 MMOL/L (ref 8–16)
BASOPHILS # BLD AUTO: 0.03 K/UL (ref 0–0.2)
BASOPHILS NFR BLD: 0.7 % (ref 0–1.9)
BUN SERPL-MCNC: 15 MG/DL (ref 6–20)
CALCIUM SERPL-MCNC: 9.4 MG/DL (ref 8.7–10.5)
CHLORIDE SERPL-SCNC: 107 MMOL/L (ref 95–110)
CO2 SERPL-SCNC: 20 MMOL/L (ref 23–29)
CREAT SERPL-MCNC: 4.5 MG/DL (ref 0.5–1.4)
DIFFERENTIAL METHOD: ABNORMAL
EOSINOPHIL # BLD AUTO: 0.1 K/UL (ref 0–0.5)
EOSINOPHIL NFR BLD: 3.2 % (ref 0–8)
ERYTHROCYTE [DISTWIDTH] IN BLOOD BY AUTOMATED COUNT: 13.4 % (ref 11.5–14.5)
EST. GFR  (AFRICAN AMERICAN): 16.3 ML/MIN/1.73 M^2
EST. GFR  (NON AFRICAN AMERICAN): 14.1 ML/MIN/1.73 M^2
GLUCOSE SERPL-MCNC: 185 MG/DL (ref 70–110)
HCT VFR BLD AUTO: 27.9 % (ref 40–54)
HGB BLD-MCNC: 8.9 G/DL (ref 14–18)
IMM GRANULOCYTES # BLD AUTO: 0.04 K/UL (ref 0–0.04)
IMM GRANULOCYTES NFR BLD AUTO: 0.9 % (ref 0–0.5)
LYMPHOCYTES # BLD AUTO: 1 K/UL (ref 1–4.8)
LYMPHOCYTES NFR BLD: 22.9 % (ref 18–48)
MCH RBC QN AUTO: 33.6 PG (ref 27–31)
MCHC RBC AUTO-ENTMCNC: 31.9 G/DL (ref 32–36)
MCV RBC AUTO: 105 FL (ref 82–98)
MONOCYTES # BLD AUTO: 0.4 K/UL (ref 0.3–1)
MONOCYTES NFR BLD: 8 % (ref 4–15)
NEUTROPHILS # BLD AUTO: 2.8 K/UL (ref 1.8–7.7)
NEUTROPHILS NFR BLD: 64.3 % (ref 38–73)
NRBC BLD-RTO: 0 /100 WBC
PHOSPHATE SERPL-MCNC: 2.7 MG/DL (ref 2.7–4.5)
PLATELET # BLD AUTO: 205 K/UL (ref 150–350)
PMV BLD AUTO: 10 FL (ref 9.2–12.9)
POCT GLUCOSE: 148 MG/DL (ref 70–110)
POCT GLUCOSE: 171 MG/DL (ref 70–110)
POCT GLUCOSE: 214 MG/DL (ref 70–110)
POCT GLUCOSE: 239 MG/DL (ref 70–110)
POCT GLUCOSE: 240 MG/DL (ref 70–110)
POCT GLUCOSE: 258 MG/DL (ref 70–110)
POTASSIUM SERPL-SCNC: 4 MMOL/L (ref 3.5–5.1)
RBC # BLD AUTO: 2.65 M/UL (ref 4.6–6.2)
SODIUM SERPL-SCNC: 138 MMOL/L (ref 136–145)
VANCOMYCIN SERPL-MCNC: 22.4 UG/ML
WBC # BLD AUTO: 4.37 K/UL (ref 3.9–12.7)

## 2019-12-21 PROCEDURE — 85025 COMPLETE CBC W/AUTO DIFF WBC: CPT

## 2019-12-21 PROCEDURE — 99232 PR SUBSEQUENT HOSPITAL CARE,LEVL II: ICD-10-PCS | Mod: GC,,, | Performed by: INTERNAL MEDICINE

## 2019-12-21 PROCEDURE — 94761 N-INVAS EAR/PLS OXIMETRY MLT: CPT

## 2019-12-21 PROCEDURE — 80100014 HC HEMODIALYSIS 1:1

## 2019-12-21 PROCEDURE — 36415 COLL VENOUS BLD VENIPUNCTURE: CPT

## 2019-12-21 PROCEDURE — 11000001 HC ACUTE MED/SURG PRIVATE ROOM

## 2019-12-21 PROCEDURE — 63600175 PHARM REV CODE 636 W HCPCS: Performed by: NURSE PRACTITIONER

## 2019-12-21 PROCEDURE — 80202 ASSAY OF VANCOMYCIN: CPT

## 2019-12-21 PROCEDURE — C9399 UNCLASSIFIED DRUGS OR BIOLOG: HCPCS | Performed by: GENERAL ACUTE CARE HOSPITAL

## 2019-12-21 PROCEDURE — 25000003 PHARM REV CODE 250: Performed by: NURSE PRACTITIONER

## 2019-12-21 PROCEDURE — 90935 PR HEMODIALYSIS, ONE EVALUATION: ICD-10-PCS | Mod: ,,, | Performed by: NURSE PRACTITIONER

## 2019-12-21 PROCEDURE — 99232 SBSQ HOSP IP/OBS MODERATE 35: CPT | Mod: GC,,, | Performed by: INTERNAL MEDICINE

## 2019-12-21 PROCEDURE — 80069 RENAL FUNCTION PANEL: CPT

## 2019-12-21 PROCEDURE — 63600175 PHARM REV CODE 636 W HCPCS: Performed by: INTERNAL MEDICINE

## 2019-12-21 PROCEDURE — 25000003 PHARM REV CODE 250: Performed by: INTERNAL MEDICINE

## 2019-12-21 PROCEDURE — 63600175 PHARM REV CODE 636 W HCPCS: Performed by: PSYCHIATRY & NEUROLOGY

## 2019-12-21 PROCEDURE — 25000003 PHARM REV CODE 250: Performed by: HOSPITALIST

## 2019-12-21 PROCEDURE — 63600175 PHARM REV CODE 636 W HCPCS: Performed by: GENERAL ACUTE CARE HOSPITAL

## 2019-12-21 PROCEDURE — 90935 HEMODIALYSIS ONE EVALUATION: CPT | Mod: ,,, | Performed by: NURSE PRACTITIONER

## 2019-12-21 RX ORDER — INSULIN ASPART 100 [IU]/ML
3 INJECTION, SOLUTION INTRAVENOUS; SUBCUTANEOUS
Status: DISCONTINUED | OUTPATIENT
Start: 2019-12-21 | End: 2019-12-22

## 2019-12-21 RX ADMIN — HYDRALAZINE HYDROCHLORIDE 50 MG: 50 TABLET, FILM COATED ORAL at 03:12

## 2019-12-21 RX ADMIN — HYDRALAZINE HYDROCHLORIDE 50 MG: 50 TABLET, FILM COATED ORAL at 06:12

## 2019-12-21 RX ADMIN — ATORVASTATIN CALCIUM 40 MG: 20 TABLET, FILM COATED ORAL at 01:12

## 2019-12-21 RX ADMIN — HEPARIN SODIUM 5000 UNITS: 5000 INJECTION, SOLUTION INTRAVENOUS; SUBCUTANEOUS at 03:12

## 2019-12-21 RX ADMIN — SODIUM CHLORIDE: 0.9 INJECTION, SOLUTION INTRAVENOUS at 07:12

## 2019-12-21 RX ADMIN — SENNOSIDES AND DOCUSATE SODIUM 1 TABLET: 8.6; 5 TABLET ORAL at 10:12

## 2019-12-21 RX ADMIN — SEVELAMER CARBONATE 0.8 G: 800 POWDER, FOR SUSPENSION ORAL at 01:12

## 2019-12-21 RX ADMIN — HYDRALAZINE HYDROCHLORIDE 25 MG: 25 TABLET, FILM COATED ORAL at 10:12

## 2019-12-21 RX ADMIN — INSULIN ASPART 6 UNITS: 100 INJECTION, SOLUTION INTRAVENOUS; SUBCUTANEOUS at 05:12

## 2019-12-21 RX ADMIN — CLONIDINE HYDROCHLORIDE 0.1 MG: 0.1 TABLET ORAL at 09:12

## 2019-12-21 RX ADMIN — QUETIAPINE FUMARATE 25 MG: 25 TABLET ORAL at 10:12

## 2019-12-21 RX ADMIN — HEPARIN SODIUM 5000 UNITS: 5000 INJECTION, SOLUTION INTRAVENOUS; SUBCUTANEOUS at 10:12

## 2019-12-21 RX ADMIN — INSULIN ASPART 3 UNITS: 100 INJECTION, SOLUTION INTRAVENOUS; SUBCUTANEOUS at 12:12

## 2019-12-21 RX ADMIN — NIFEDIPINE 60 MG: 60 TABLET, FILM COATED, EXTENDED RELEASE ORAL at 01:12

## 2019-12-21 RX ADMIN — SEVELAMER CARBONATE 0.8 G: 800 POWDER, FOR SUSPENSION ORAL at 05:12

## 2019-12-21 RX ADMIN — CLONIDINE HYDROCHLORIDE 0.1 MG: 0.1 TABLET ORAL at 10:12

## 2019-12-21 RX ADMIN — PANTOPRAZOLE SODIUM 40 MG: 40 TABLET, DELAYED RELEASE ORAL at 01:12

## 2019-12-21 RX ADMIN — INSULIN ASPART 2 UNITS: 100 INJECTION, SOLUTION INTRAVENOUS; SUBCUTANEOUS at 12:12

## 2019-12-21 RX ADMIN — HYDRALAZINE HYDROCHLORIDE 50 MG: 50 TABLET, FILM COATED ORAL at 10:12

## 2019-12-21 RX ADMIN — INSULIN DETEMIR 6 UNITS: 100 INJECTION, SOLUTION SUBCUTANEOUS at 10:12

## 2019-12-21 RX ADMIN — HEPARIN SODIUM 5000 UNITS: 5000 INJECTION, SOLUTION INTRAVENOUS; SUBCUTANEOUS at 06:12

## 2019-12-21 NOTE — CONSULTS
[facility]  Endocrinology  Diabetes Consult Note    Consult Requested by: Milana Xavier MD   Reason for admit: Altered mental status    HISTORY OF PRESENT ILLNESS:  Reason for Consult: Management of DM2     Surgical Procedure and Date: N/A     Diabetes diagnosis year: Dx when patient was 20yrs old     Home Diabetes Medications:  Glipizide 5mg BID     How often checking glucose at home? Once daily   BG readings on regimen:  (80s- 160s)   Hypoglycemia on the regimen?  YES   Missed doses on regimen? Denies     Diabetes Complications include:     Hypoglycemia     Complicating diabetes co morbidities:   ESRD      HPI:   Patient is a 49 y.o. male with a diagnosis of DM2, HTN, ESRD, PVD s/p left transmetatarsal amputation and CVA that was admitted to [facility] for acute encephalopathy in the setting of hypoglycemia < 20.  Per patient and EMR pt was driving with his wife when he noted acute onset left sided numbness. Wife was able to pull car over and call 911. EMS found pt confused and combative. On arrival to OSH Routine labs showed a K of 6.2 and Cr of 10.2. Pt was given Regular insulin IVP for Potassium shift. CT Head showed no acute change and encephalomalacia in L frontal and L parietal region from prior craniotomy (unknown reason or diagnosis). Pt required mechanical ventilation for airway protection due to agitation and AMS.  Pt was transferred to [facility] and on arrival was found to have glucose < 5mg/dL.  Pt was successfully extubated after prolonged ICU course complicated by periods of agitation. Pt currently is AAXO3 with no Focal deficits.  Pt was consulted with endocrinology for management of Diabetes.            Interval HPI:   Pt is AAOX3 and back to baseline mental status.  Tolerating 100% of PO diet.  Glucose trend (170 - 300s)  Above goal.  Pt currently on Novolog 3 units TID and Low dose correction insulin.     Overnight events: SANDIP   Eatin%  Nausea: No  Hypoglycemia and intervention:  No  Fever: No  TPN and/or TF: No    PMH, PSH, FH, SH updated and reviewed     ROS:  Review of Systems   Constitutional: Negative for appetite change.   HENT: Negative for trouble swallowing.    Eyes: Negative for visual disturbance.   Respiratory: Negative for shortness of breath.    Cardiovascular: Negative for chest pain.   Gastrointestinal: Negative for constipation.   Endocrine: Negative for cold intolerance.   Skin: Negative for rash.   Hematological: Negative for adenopathy.   Psychiatric/Behavioral: Negative for agitation.       PHYSICAL EXAMINATION:  Vitals:    12/21/19 1254   BP: (!) 194/88   Pulse: 84   Resp: 16   Temp: 97.6 °F (36.4 °C)     Body mass index is 22.02 kg/m².    Physical Exam   Constitutional: He appears well-developed.   HENT:   Right Ear: External ear normal.   Left Ear: External ear normal.   Nose: Nose normal.   Neck: No tracheal deviation present. No thyromegaly present.   Cardiovascular: Normal rate.   No murmur heard.  Pulmonary/Chest: Effort normal and breath sounds normal.   Abdominal: Soft. He exhibits no mass. No hernia.   Musculoskeletal: He exhibits no edema.   Neurological: He is alert. No cranial nerve deficit or sensory deficit. Coordination normal.   Skin: No rash noted.   Psychiatric: He has a normal mood and affect. Judgment normal.   Vitals reviewed.        Labs Reviewed and Include   No results for input(s): GLU, CALCIUM, ALBUMIN, PROT, NA, K, CO2, CL, BUN, CREATININE, ALKPHOS, ALT, AST, BILITOT in the last 24 hours.  Lab Results   Component Value Date    WBC 5.28 12/20/2019    HGB 7.8 (L) 12/20/2019    HCT 25.0 (L) 12/20/2019     (H) 12/20/2019     12/20/2019     No results for input(s): TSH, FREET4 in the last 168 hours.  Lab Results   Component Value Date    HGBA1C 7.5 (H) 12/11/2019       Nutritional status:   Body mass index is 22.02 kg/m².  Lab Results   Component Value Date    ALBUMIN 3.0 (L) 12/20/2019    ALBUMIN 2.9 (L) 12/19/2019    ALBUMIN 2.8 (L)  12/18/2019     No results found for: PREALBUMIN    Estimated Creatinine Clearance: 10.5 mL/min (A) (based on SCr of 8.2 mg/dL (H)).    Accu-Checks  Recent Labs     12/18/19  2212 12/19/19  0749 12/19/19  1148 12/19/19  1713 12/19/19  2158 12/20/19  0804 12/20/19  1149 12/20/19  1655 12/20/19  2126 12/21/19  1256   POCTGLUCOSE 291* 194* 244* 265* 286* 239* 214* 240* 302* 171*        ASSESSMENT and PLAN    * Altered mental status  -Resolved   -Likely Hypoglycemic encephalopathy presenting with AMS and Focal deficits   -Managed by primary team   -Avoid hypoglycemias       Type 2 diabetes mellitus  -DM2 Dx @ age 20   -A1C 7.5   -Home meds:  Glipizide 5mg BID   -Pt with diabetic nephropathy, PVD and retinopathy   -Glucose trend (190 - 300s) Above goal  On Novolog 3 units TID and Low dose correction   -No hypoglycemias since initial hypoglycemia at admission  -Pt tolerating 100% of PO diet     Plan:   -Start Levemir 6 units qHS (Weight based 0.3units/kg and 30% reduced dose due to hypoglycemias and ESRD on HD)   -C/w Novolog 3 units TID   -C/w Low dose correction   -FS qAC/HS2AM     Discharge:   -Likely Basal insulin and GLP-1 (Trulilicity 0.75mg SC qweekly) due to history of PAD   -DC glipizide       Hypoglycemia, coma  -Back to baseline mental status   -Likely 2/2 Glipizide and Insulin administered for K shift in a patient with ESRD  -Avoid insulin stacking as it can precipitate hypoglycemias       ESRD (end stage renal disease) on dialysis  -Managed by nephrology   -Avoid insulin stacking as it can precipitate hypoglycemias         Plan discussed with patient, family, and RN at bedside.     Dany Cui MD  Endocrinology  [facility]

## 2019-12-21 NOTE — PLAN OF CARE
Plan of care discussed with patient. Pt is AAOx4. Dialysis fistula positive thrill and bruit. Pt went to dialysis today, pt tolerated well. BG monitored. Patient is free of fall/trauma/injury. Denies CP, SOB, or pain/discomfort. All questions addressed. Will continue to monitor

## 2019-12-21 NOTE — PLAN OF CARE
Patient is lying in bed with eyes closed, but is easily awakened when name is called.  Breathing is even and unlabored.  Fall precautions are in place, and call light is within reach.  Care plan has been reviewed, and patient has no request at this time.  Will continue to monitor until arrival of day shift.

## 2019-12-21 NOTE — ASSESSMENT & PLAN NOTE
-DM2 Dx @ age 20   -A1C 7.5   -Home meds:  Glipizide 5mg BID   -Pt with diabetic nephropathy, PVD and retinopathy   -Glucose trend (190 - 300s) Above goal  On Novolog 3 units TID and Low dose correction   -No hypoglycemias since admission   -Pt tolerating 100% of PO diet     Plan:   -Start Levemir 6 units qHS (Weight based 0.3units/kg and 30% reduced dose due to hypoglycemias and ESRD on HD)   -C/w Novolog 3 units TID   -C/w Low dose correction   -FS qAC/HS2AM     Discharge:   -Likely Basal insulin and GLP-1 (Trulilicity 0.75mg SC qweekly) due to history of PAD   -DC glipizide

## 2019-12-21 NOTE — SUBJECTIVE & OBJECTIVE
Interval HPI:   Pt is AAOX3 and back to baseline mental status.  Tolerating 100% of PO diet.  Glucose trend (170 - 300s)  Above goal.  Pt currently on Novolog 3 units TID and Low dose correction insulin.     Overnight events: SANDIP   Eatin%  Nausea: No  Hypoglycemia and intervention: No  Fever: No  TPN and/or TF: No    PMH, PSH, FH, SH updated and reviewed     ROS:  Review of Systems   Constitutional: Negative for appetite change.   HENT: Negative for trouble swallowing.    Eyes: Negative for visual disturbance.   Respiratory: Negative for shortness of breath.    Cardiovascular: Negative for chest pain.   Gastrointestinal: Negative for constipation.   Endocrine: Negative for cold intolerance.   Skin: Negative for rash.   Hematological: Negative for adenopathy.   Psychiatric/Behavioral: Negative for agitation.       PHYSICAL EXAMINATION:  Vitals:    19 1254   BP: (!) 194/88   Pulse: 84   Resp: 16   Temp: 97.6 °F (36.4 °C)     Body mass index is 22.02 kg/m².    Physical Exam   Constitutional: He appears well-developed.   HENT:   Right Ear: External ear normal.   Left Ear: External ear normal.   Nose: Nose normal.   Neck: No tracheal deviation present. No thyromegaly present.   Cardiovascular: Normal rate.   No murmur heard.  Pulmonary/Chest: Effort normal and breath sounds normal.   Abdominal: Soft. He exhibits no mass. No hernia.   Musculoskeletal: He exhibits no edema.   Neurological: He is alert. No cranial nerve deficit or sensory deficit. Coordination normal.   Skin: No rash noted.   Psychiatric: He has a normal mood and affect. Judgment normal.   Vitals reviewed.

## 2019-12-21 NOTE — HPI
Reason for Consult: Management of DM2     Surgical Procedure and Date: N/A     Diabetes diagnosis year: Dx when patient was 20yrs old     Home Diabetes Medications:  Glipizide 5mg BID     How often checking glucose at home? Once daily   BG readings on regimen:  (80s- 160s)   Hypoglycemia on the regimen?  YES   Missed doses on regimen? Denies     Diabetes Complications include:     Hypoglycemia     Complicating diabetes co morbidities:   ESRD      HPI:   Patient is a 49 y.o. male with a diagnosis of DM2, HTN, ESRD, PVD s/p left transmetatarsal amputation and CVA that was admitted to [facility] for acute encephalopathy in the setting of hypoglycemia < 20.  Per patient and EMR pt was driving with his wife when he noted acute onset left sided numbness. Wife was able to pull car over and call 911. EMS found pt confused and combative. On arrival to ED Routine labs showed a K of 6.2 and Cr of 10.2. Pt was given Regular insulin IVP for Potassium shift without glucose administration and on arrival Glucose < 20mg/dL.  Pt was transported to OSH where CT Head showed no acute change and encephalomalacia in L frontal and L parietal region from prior craniotomy (unknown reason or diagnosis). Pt required mechanical ventilation for airway protection.  Pt was subsequently extubated, focal deficits resolved and transferred to hospital floors.  Pt currently on Novolog 3 units pre-meals, noted to have hyperglycemia.  Pt was consulted with endocrinology for management of Diabetes.

## 2019-12-21 NOTE — ASSESSMENT & PLAN NOTE
-Resolved   -Likely Hypoglycemic encephalopathy presenting with AMS and Focal deficits   -Managed by primary team   -Avoid hypoglycemias

## 2019-12-21 NOTE — PROGRESS NOTES
Hemodialysis Note  Pt seen and evaluated while undergoing dialysis. Tolerating dialysis with current UFR, without complications. Labs reviewed and dialysate bath adjusted.     BFR: 400  UF goal: 3L as tolerated  Anemia: hold epogen d/t uncontrolled HTN; ferritin 2294 12/17 ; maintain Hgb>7; managed by Hospital Medicine  MBD: continue sevelamer carbonate  HTN: uncontrolled; HD RN to medicate with PRN hydralazine dose on dialysis; challenging volume removal; managed by Hospital Medicine  Diet: on DM/renal; strict I/Os; daily wts

## 2019-12-21 NOTE — PROGRESS NOTES
Transported from [facility] to [facility] on stretcher by transport.    Report given to Alex Deshpande

## 2019-12-21 NOTE — PROGRESS NOTES
"              [facility] Hospital Medicine  Progress Note    Team: [facility] HOSP MED N   Attending MD: Milana Xavier MD  Admit Date: 12/11/2019  [place]   Length of Stay:  LOS: 10 days   Code status: Full Code    Principal Problem: Altered mental status    Interval hx: alert and interactive today; does not want to go to inpatient rehab as he would like to travel to [place] with his family for his daughter's vernon; brother at bedside and pleads with patient to go to inpatient rehab; the patient has told me travel by airplane but family states it is by bus; reiterated he is unsafe for travel in his current physical state but patient insistent on discharge for travel by 12/24.    ROS:  Constitutional: no fever or chills  Respiratory: no cough or shortness of breath  Cardiovascular: no chest pain or palpitations  Gastrointestinal: no nausea or vomiting, no abdominal pain; last BM: 12/20  Genitourinary: no hematuria or dysuria  Integument/Breast: no rash or pruritis  Musculoskeletal: no arthralgias or myalgias  Neurological: no seizures or tremors  Behavioral/Psych: no auditory or visual hallucinations      Physical Exam  Temp:  [97 °F (36.1 °C)-98.4 °F (36.9 °C)] 97.6 °F (36.4 °C)  Pulse:  [70-84] 84  Resp:  [16-20] 16  SpO2:  [96 %-100 %] 98 %  BP: (138-194)/(60-88) 194/88      Intake/Output Summary (Last 24 hours) at 12/21/2019 1448  Last data filed at 12/21/2019 1125  Gross per 24 hour   Intake 650 ml   Output 3650 ml   Net -3000 ml       Wt Readings from Last 1 Encounters:   12/18/19 0400 69.6 kg (153 lb 7 oz)   12/12/19 1205 77.6 kg (171 lb)   12/11/19 2217 77.6 kg (171 lb 1.2 oz)   12/11/19 1336 77.6 kg (171 lb)        Estimated body mass index is 22.02 kg/m² as calculated from the following:    Height as of this encounter: 5' 10" (1.778 m).    Weight as of this encounter: 69.6 kg (153 lb 7 oz).    General: well developed, well nourished, no distress  Lungs:  clear to auscultation bilaterally " and normal respiratory effort.  Cardiovascular: Heart: regular rate and rhythm, S1, S2 normal, no murmur, click, rub or gallop. Chest Wall: no tenderness. Extremities: no cyanosis, no edema, no clubbing. + bruit to left UE  Abdomen/Rectal: Abdomen: soft, non-tender non-distended; bowel sounds normal  Skin: Skin color, texture, turgor normal. No rashes or lesions.  Neurologic: Normal strength and tone. No focal numbness or weakness.  Psych/Behavioral:  Alert/awake and appropriate; O X 3      Recent Results (from the past 24 hour(s))   POCT glucose    Collection Time: 12/20/19  4:55 PM   Result Value Ref Range    POCT Glucose 240 (H) 70 - 110 mg/dL   POCT Glucose, Hand-Held Device    Collection Time: 12/20/19  5:15 PM   Result Value Ref Range    POC Glucose 240 (A) 70 - 110 MG/DL   POCT glucose    Collection Time: 12/20/19  9:26 PM   Result Value Ref Range    POCT Glucose 302 (H) 70 - 110 mg/dL   Vancomycin, random    Collection Time: 12/21/19  4:14 AM   Result Value Ref Range    Vancomycin, Random 22.4 Not established ug/mL   POCT glucose    Collection Time: 12/21/19 12:56 PM   Result Value Ref Range    POCT Glucose 171 (H) 70 - 110 mg/dL       Recent Labs   Lab 12/18/19 0431 12/19/19 0443 12/20/19 0452   WBC 5.37  5.24 5.19  5.12 5.28   HGB 8.1*  8.1* 8.3*  8.2* 7.8*   HCT 24.6*  24.7* 25.9*  25.8* 25.0*     156 173  181 179       Recent Labs   Lab 12/18/19  0431 12/19/19 0443 12/20/19 0452    140 137   K 3.6 4.1 4.5    106 103   CO2 24 24 26   BUN 32* 20 33*   CREATININE 7.9* 5.9* 8.2*   * 218* 247*   CALCIUM 8.0* 8.3* 8.0*   MG 2.3 2.1 2.4   PHOS 5.3* 3.7 4.3       Recent Labs   Lab 12/18/19 0431 12/19/19 0443 12/20/19  0452   ALKPHOS 84 86 77   ALT 36 48* 34   AST 37 43* 24   ALBUMIN 2.8* 2.9* 3.0*   PROT 5.7* 6.2 6.1   BILITOT 0.4 0.5 0.4       Recent Labs   Lab 12/19/19  2158 12/20/19  0804 12/20/19  1149 12/20/19  1655 12/20/19  2126 12/21/19  1256   POCTGLUCOSE 286*  239* 214* 240* 302* 171*       Hemoglobin A1C   Date Value Ref Range Status   12/11/2019 7.5 (H) 4.0 - 5.6 % Final     Comment:     ADA Screening Guidelines:  5.7-6.4%  Consistent with prediabetes  >or=6.5%  Consistent with diabetes  High levels of fetal hemoglobin interfere with the HbA1C  assay. Heterozygous hemoglobin variants (HbS, HgC, etc)do  not significantly interfere with this assay.   However, presence of multiple variants may affect accuracy.         Scheduled Meds:   sodium chloride 0.9%   Intravenous Once    atorvastatin  40 mg Oral Daily    cloNIDine  0.1 mg Oral BID    heparin (porcine)  5,000 Units Subcutaneous Q8H    hydrALAZINE  50 mg Oral Q8H    insulin aspart U-100  3 Units Subcutaneous TIDWM    insulin detemir U-100  6 Units Subcutaneous QHS    NIFEdipine  60 mg Oral Daily    pantoprazole  40 mg Oral Daily    polyethylene glycol  17 g Oral Daily    QUEtiapine  25 mg Oral QHS    senna-docusate 8.6-50 mg  1 tablet Oral BID    sevelamer carbonate  0.8 g Oral TID WM       Continuous Infusions:    As Needed: acetaminophen, albuterol sulfate, Dextrose 10% Bolus, Dextrose 10% Bolus, dextrose 50%, famotidine, glucagon (human recombinant), glucose, glucose, haloperidol lactate, hydrALAZINE, hydrALAZINE, insulin aspart U-100, melatonin, senna-docusate 8.6-50 mg, sodium chloride 0.9%, Pharmacy to dose Vancomycin consult **AND** vancomycin - pharmacy to dose    Active Hospital Problems    Diagnosis  POA    *Altered mental status [R41.82]  Yes    Impaired mobility and ADLs [Z74.09]  No    MRSA (methicillin resistant Staphylococcus aureus) infection [A49.02]  Yes    Agitation [R45.1]  Unknown    Essential hypertension [I10]  Yes    ESRD (end stage renal disease) on dialysis [N18.6, Z99.2]  Not Applicable    Type 2 diabetes mellitus [E11.9]  Yes    Hypoglycemia, coma [E15]  Yes    Acute respiratory failure with hypoxia [J96.01]  Yes    Hyperkalemia [E87.5]  Yes    Metabolic acidosis  [E87.2]  Yes    Hypoglycemia [E16.2]  Yes    Acute encephalopathy [G93.40]  Yes      Resolved Hospital Problems    Diagnosis Date Resolved POA    On mechanically assisted ventilation [Z99.11] 12/15/2019 Not Applicable         Assessment and Plan    Altered mental status due to hypoglycemia    49 year old male with HTN, DM2 and ESRD on HD who presented to OSH with acute onset L sided weakness and confusion.   - CT Head without at OSH unremarkable for acute infarction or hemorrhage   - CTA Head/neck - no acute change, no high grade stenosis  - SBP <200 mmHg  - EKG NSR , ECHO EF 53%  - SSI  - Q4 POCT Glucose checks  - MRI showed no acute intracranial abnormalities   12/17/2019: had an episode of hypoglycemia on 12/15 and glucoses > 150 prior; not on diabetic diet; maintain glucoses > 70 to ensure hypoglycemia not causing behavior changes; repeat CT as patient's sister states this behavior is similar to previous when he had head injury     Dysphagia    - advanced to diabetic/renal diet with thin liquids on 12/18  - Nepro tid    Agitation  Agitation 2/2 metabolic encephalopathy   -d/c EEG   -acute agitation with correction of glucose   - required fentanyl gtt, precedex in NCC  - recurrence of agitation/confusion on 12/16 with improvement after haldol/ativan  - continue scheduled quetiapine 50 BID; will slowly wean if stable on 12/20     MRSA infection -  in sputum  Started on zosyn, de-escalated to ceftriaxone 12/15  12/15 -felt to be colonization, but pt was on a ventilator.  willl treat with vanc w HD x 2 weeks - end date 12/30     Essential hypertension  - Hypertensive on admit,    - Goal SBP <200  - norvasc 5 po daily  - hydralazine 25 q 8 hours prn SBP > 180     ESRD (end stage renal disease) on dialysis  - HD fistula in LUE  - Nephrology following   - Renally dose medications  - Strict I/Os   - HD 12/16, scheduled MWF   -he has HD set up in [place] for planned travel     Hypoglycemia, coma  - Present on  admission  - OSH attempted to shift HyperK with insulin    - On arrival, pt with glucose <5  - D10 drip gtt stopped (12/12); resume as indicated  - on 12/12 Glucose (248)  - POCT glucose q4 hour and SSI   - Monitor CMP     Type 2 diabetes mellitus  - Home glipizide held; likely related to hypoglycemia  - on admit glucose <5, started on D10 drip d/t hypoglycemia   - D10 gtt stopped (12/12)   - (12/13) Glucose 131  - on low dose SSNI, POCT glucose Q AC and HS  -diabetic diet on 12/19 due to glucoses are rising  -increase SSNI to moderate dose; will have endocrine evaluate for safest home medication  -appreciate endocrine evaluation; continue levemir with novolog now and trulicity and levemir at home      Diet: Diet diabetic [facility]; 2000 Calorie; Renal; Thin   DVT PPx:   VTE Risk Mitigation (From admission, onward)         Ordered     heparin (porcine) injection 5,000 Units  Every 8 hours      12/12/19 0949     IP VTE LOW RISK PATIENT  Once      12/11/19 1520     Place sequential compression device  Until discontinued      12/11/19 1520                Does patient have Capacity?  Yes  Goals- resume HD, pt repeated that he wants to die to nurses on 12/16 - new per sister; denies depression or having suicidal ideation on 12/19  Code Status- FULL  Pain management- well controlled  Prognosis-  fair  Family discussions-   12/17 - with sister who has not been familiar with his medical history recently but knew about trauma with brain injury in [place]; he is appropriate when they converse by phone.  12/21 - long discussion with patient and brother; patient refuses inpatient rehab and insists on travel to [place] on 12/20   Functional Status - PT/OT  Inpatient rehab recommended; will order rolling walker if patient goes home for safety.     Discharge: Inpatient rehab recommended but patient wishes to go home for travel; discharge once medically stable with insulin regimen    Milana Xavier MD  [facility]  Medicine  [facility]  [phone]

## 2019-12-21 NOTE — PROGRESS NOTES
HD Tx ended. 3L removed during 3.5Hr Tx. Chung well. Blood returned via LUIGI AVF. 15g needles removed x2. Gauze and tape applied, pressure held for 5mins, hemostasis achieved.

## 2019-12-21 NOTE — ASSESSMENT & PLAN NOTE
-Back to baseline mental status   -Likely 2/2 Glipizide and Insulin administered in ED in a patient with ESRD   -Avoid insulin stacking as it can precipitate hypoglycemias

## 2019-12-21 NOTE — PROGRESS NOTES
Pharmacokinetic Assessment Follow Up: IV Vancomycin    Vancomycin serum concentration assessment(s) and Plan:    -Vancomycin random level returned this AM @ 22.4 mcg/mL, above therapeutic range of 15-20 mcg/mL.   -Hold today's dose.   -Patient is scheduled for HD this AM, 12/21.  -Obtain daily random vancomycin levels.  -Re-dose for pre-HD levels < 20 mcg/mL.  -Anticipate re-dosing with vancomycin  mg tomorrow.     Drug levels (last 3 results):  Recent Labs   Lab Result Units 12/19/19 0443 12/20/19 0452 12/21/19  0414   Vancomycin, Random ug/mL 26.4 23.8 22.4       Pharmacy will continue to follow and monitor vancomycin.    Please contact pharmacy at extension [phone] for questions regarding this assessment.    Thank you for the consult,   Fan Irwin       Patient brief summary:  Balta Lamb is a 49 y.o. male initiated on antimicrobial therapy with IV Vancomycin for treatment of MRSA infection in sputum    The patient's current regimen is post-HD dosing of vancomycin    Drug Allergies:   Review of patient's allergies indicates:  No Known Allergies    Actual Body Weight:   69.6 kg     Renal Function:   Estimated Creatinine Clearance: 10.5 mL/min (A) (based on SCr of 8.2 mg/dL (H)).,     Dialysis Method (if applicable):  intermittent HD    CBC (last 72 hours):  Recent Labs   Lab Result Units 12/19/19 0443 12/20/19 0452   WBC K/uL 5.19  5.12 5.28   Hemoglobin g/dL 8.3*  8.2* 7.8*   Hematocrit % 25.9*  25.8* 25.0*   Platelets K/uL 173  181 179   Gran% % 61.8  63.7 61.7   Lymph% % 22.7  21.7 23.7   Mono% % 10.4  10.7 10.4   Eosinophil% % 3.7  2.9 3.0   Basophil% % 0.4  0.4 0.4   Differential Method  Automated  Automated Automated       Metabolic Panel (last 72 hours):  Recent Labs   Lab Result Units 12/19/19 0443 12/20/19 0452   Sodium mmol/L 140 137   Potassium mmol/L 4.1 4.5   Chloride mmol/L 106 103   CO2 mmol/L 24 26   Glucose mg/dL 218* 247*   BUN, Bld mg/dL 20 33*   Creatinine mg/dL  5.9* 8.2*   Albumin g/dL 2.9* 3.0*   Total Bilirubin mg/dL 0.5 0.4   Alkaline Phosphatase U/L 86 77   AST U/L 43* 24   ALT U/L 48* 34   Magnesium mg/dL 2.1 2.4   Phosphorus mg/dL 3.7 4.3       Vancomycin Administrations:  vancomycin given in the last 96 hours      No antibiotic orders with administrations found.                Microbiologic Results:  Microbiology Results (last 7 days)     Procedure Component Value Units Date/Time    Blood culture [[id number]] Collected:  12/14/19 1220    Order Status:  Completed Specimen:  Blood from Peripheral, Foot, Left Updated:  12/19/19 1412     Blood Culture, Routine No growth after 5 days.    Blood culture [[id number]] Collected:  12/14/19 1225    Order Status:  Completed Specimen:  Blood from Peripheral, Ankle, Right Updated:  12/19/19 1412     Blood Culture, Routine No growth after 5 days.    Blood culture [[id number]] Collected:  12/11/19 2017    Order Status:  Completed Specimen:  Blood from Peripheral, Antecubital, Left Updated:  12/16/19 2212     Blood Culture, Routine No growth after 5 days.    Narrative:       Blood cultures x 2 different sites. 4 bottles total. Please  draw cultures before administering antibiotics.    Blood culture [[id number]] Collected:  12/11/19 2017    Order Status:  Completed Specimen:  Blood from Peripheral, Upper Arm, Left Updated:  12/16/19 2212     Blood Culture, Routine No growth after 5 days.    Narrative:       Blood cultures from 2 different sites. 4 bottles total.  Please draw before starting antibiotics.    Culture, Respiratory with Gram Stain [[id number]]  (Abnormal)  (Susceptibility) Collected:  12/11/19 2023    Order Status:  Completed Specimen:  Respiratory from Sputum, Induced Updated:  12/16/19 1011     Respiratory Culture No Pseudomonas isolated.      METHICILLIN RESISTANT STAPHYLOCOCCUS AUREUS  Few  Normal respiratory eliane also present       Gram Stain (Respiratory) <10 epithelial cells per low power field.     Gram Stain  (Respiratory) Rare WBC's     Gram Stain (Respiratory) Few Gram positive cocci     Gram Stain (Respiratory) Rare Gram negative rods    Narrative:       Mini-BAL.    Culture, Anaerobe [[id number]]     Order Status:  Canceled Specimen:  Sputum     Aerobic culture [[id number]]     Order Status:  Canceled Specimen:  Sputum

## 2019-12-22 LAB
POCT GLUCOSE: 236 MG/DL (ref 70–110)
POCT GLUCOSE: 262 MG/DL (ref 70–110)
POCT GLUCOSE: 265 MG/DL (ref 70–110)
POCT GLUCOSE: 335 MG/DL (ref 70–110)
VANCOMYCIN SERPL-MCNC: 15.5 UG/ML

## 2019-12-22 PROCEDURE — 94761 N-INVAS EAR/PLS OXIMETRY MLT: CPT

## 2019-12-22 PROCEDURE — 36415 COLL VENOUS BLD VENIPUNCTURE: CPT

## 2019-12-22 PROCEDURE — 25000003 PHARM REV CODE 250: Performed by: HOSPITALIST

## 2019-12-22 PROCEDURE — 99232 SBSQ HOSP IP/OBS MODERATE 35: CPT | Mod: GC,,, | Performed by: INTERNAL MEDICINE

## 2019-12-22 PROCEDURE — 99222 PR INITIAL HOSPITAL CARE,LEVL II: ICD-10-PCS | Mod: GC,,, | Performed by: INTERNAL MEDICINE

## 2019-12-22 PROCEDURE — 63600175 PHARM REV CODE 636 W HCPCS: Performed by: PSYCHIATRY & NEUROLOGY

## 2019-12-22 PROCEDURE — 99232 PR SUBSEQUENT HOSPITAL CARE,LEVL II: ICD-10-PCS | Mod: GC,,, | Performed by: INTERNAL MEDICINE

## 2019-12-22 PROCEDURE — 25000003 PHARM REV CODE 250: Performed by: INTERNAL MEDICINE

## 2019-12-22 PROCEDURE — 99222 1ST HOSP IP/OBS MODERATE 55: CPT | Mod: GC,,, | Performed by: INTERNAL MEDICINE

## 2019-12-22 PROCEDURE — 11000001 HC ACUTE MED/SURG PRIVATE ROOM

## 2019-12-22 PROCEDURE — 80202 ASSAY OF VANCOMYCIN: CPT

## 2019-12-22 PROCEDURE — 25000003 PHARM REV CODE 250: Performed by: NURSE PRACTITIONER

## 2019-12-22 PROCEDURE — 63600175 PHARM REV CODE 636 W HCPCS: Performed by: GENERAL ACUTE CARE HOSPITAL

## 2019-12-22 PROCEDURE — 63600175 PHARM REV CODE 636 W HCPCS: Performed by: INTERNAL MEDICINE

## 2019-12-22 PROCEDURE — C9399 UNCLASSIFIED DRUGS OR BIOLOG: HCPCS | Performed by: GENERAL ACUTE CARE HOSPITAL

## 2019-12-22 RX ORDER — HYDRALAZINE HYDROCHLORIDE 50 MG/1
50 TABLET, FILM COATED ORAL EVERY 12 HOURS
Status: DISCONTINUED | OUTPATIENT
Start: 2019-12-22 | End: 2019-12-23 | Stop reason: HOSPADM

## 2019-12-22 RX ORDER — INSULIN ASPART 100 [IU]/ML
0-5 INJECTION, SOLUTION INTRAVENOUS; SUBCUTANEOUS
Status: DISCONTINUED | OUTPATIENT
Start: 2019-12-22 | End: 2019-12-23 | Stop reason: HOSPADM

## 2019-12-22 RX ORDER — LOSARTAN POTASSIUM 25 MG/1
25 TABLET ORAL NIGHTLY
Status: DISCONTINUED | OUTPATIENT
Start: 2019-12-22 | End: 2019-12-23 | Stop reason: HOSPADM

## 2019-12-22 RX ORDER — INSULIN ASPART 100 [IU]/ML
4 INJECTION, SOLUTION INTRAVENOUS; SUBCUTANEOUS
Status: DISCONTINUED | OUTPATIENT
Start: 2019-12-22 | End: 2019-12-23 | Stop reason: HOSPADM

## 2019-12-22 RX ADMIN — CLONIDINE HYDROCHLORIDE 0.1 MG: 0.1 TABLET ORAL at 09:12

## 2019-12-22 RX ADMIN — HYDRALAZINE HYDROCHLORIDE 50 MG: 50 TABLET, FILM COATED ORAL at 09:12

## 2019-12-22 RX ADMIN — HEPARIN SODIUM 5000 UNITS: 5000 INJECTION, SOLUTION INTRAVENOUS; SUBCUTANEOUS at 09:12

## 2019-12-22 RX ADMIN — ATORVASTATIN CALCIUM 40 MG: 20 TABLET, FILM COATED ORAL at 08:12

## 2019-12-22 RX ADMIN — HEPARIN SODIUM 5000 UNITS: 5000 INJECTION, SOLUTION INTRAVENOUS; SUBCUTANEOUS at 02:12

## 2019-12-22 RX ADMIN — INSULIN ASPART 2 UNITS: 100 INJECTION, SOLUTION INTRAVENOUS; SUBCUTANEOUS at 08:12

## 2019-12-22 RX ADMIN — INSULIN ASPART 3 UNITS: 100 INJECTION, SOLUTION INTRAVENOUS; SUBCUTANEOUS at 05:12

## 2019-12-22 RX ADMIN — HEPARIN SODIUM 5000 UNITS: 5000 INJECTION, SOLUTION INTRAVENOUS; SUBCUTANEOUS at 06:12

## 2019-12-22 RX ADMIN — INSULIN ASPART 1 UNITS: 100 INJECTION, SOLUTION INTRAVENOUS; SUBCUTANEOUS at 09:12

## 2019-12-22 RX ADMIN — CLONIDINE HYDROCHLORIDE 0.1 MG: 0.1 TABLET ORAL at 08:12

## 2019-12-22 RX ADMIN — SEVELAMER CARBONATE 0.8 G: 800 POWDER, FOR SUSPENSION ORAL at 12:12

## 2019-12-22 RX ADMIN — HYDRALAZINE HYDROCHLORIDE 50 MG: 50 TABLET, FILM COATED ORAL at 06:12

## 2019-12-22 RX ADMIN — INSULIN DETEMIR 8 UNITS: 100 INJECTION, SOLUTION SUBCUTANEOUS at 09:12

## 2019-12-22 RX ADMIN — LOSARTAN POTASSIUM 25 MG: 25 TABLET ORAL at 09:12

## 2019-12-22 RX ADMIN — QUETIAPINE FUMARATE 25 MG: 25 TABLET ORAL at 09:12

## 2019-12-22 RX ADMIN — SEVELAMER CARBONATE 0.8 G: 800 POWDER, FOR SUSPENSION ORAL at 05:12

## 2019-12-22 RX ADMIN — PANTOPRAZOLE SODIUM 40 MG: 40 TABLET, DELAYED RELEASE ORAL at 08:12

## 2019-12-22 RX ADMIN — NIFEDIPINE 60 MG: 60 TABLET, FILM COATED, EXTENDED RELEASE ORAL at 08:12

## 2019-12-22 RX ADMIN — VANCOMYCIN HYDROCHLORIDE 750 MG: 750 INJECTION, POWDER, LYOPHILIZED, FOR SOLUTION INTRAVENOUS at 10:12

## 2019-12-22 RX ADMIN — SENNOSIDES AND DOCUSATE SODIUM 1 TABLET: 8.6; 5 TABLET ORAL at 09:12

## 2019-12-22 RX ADMIN — INSULIN ASPART 4 UNITS: 100 INJECTION, SOLUTION INTRAVENOUS; SUBCUTANEOUS at 12:12

## 2019-12-22 RX ADMIN — SEVELAMER CARBONATE 0.8 G: 800 POWDER, FOR SUSPENSION ORAL at 08:12

## 2019-12-22 RX ADMIN — SENNOSIDES AND DOCUSATE SODIUM 1 TABLET: 8.6; 5 TABLET ORAL at 08:12

## 2019-12-22 RX ADMIN — INSULIN ASPART 4 UNITS: 100 INJECTION, SOLUTION INTRAVENOUS; SUBCUTANEOUS at 05:12

## 2019-12-22 NOTE — PROGRESS NOTES
[facility]  Endocrinology  Progress Note    Admit Date: 2019     Reason for Consult: Management of DM2     Surgical Procedure and Date: N/A     Diabetes diagnosis year: Dx when patient was 20yrs old     Home Diabetes Medications:  Glipizide 5mg BID     How often checking glucose at home? Once daily   BG readings on regimen:  (80s- 160s)   Hypoglycemia on the regimen?  YES   Missed doses on regimen? Denies     Diabetes Complications include:     Hypoglycemia     Complicating diabetes co morbidities:   ESRD      HPI:   Patient is a 49 y.o. male with a diagnosis of DM2, HTN, ESRD, PVD s/p left transmetatarsal amputation and CVA that was admitted to [facility] for acute encephalopathy in the setting of hypoglycemia < 20.  Per patient and EMR pt was driving with his wife when he noted acute onset left sided numbness. Wife was able to pull car over and call 911. EMS found pt confused and combative. On arrival to ED Routine labs showed a K of 6.2 and Cr of 10.2. Pt was given Regular insulin IVP for Potassium shift without glucose administration and on arrival Glucose < 20mg/dL.  Pt was transported to OSH where CT Head showed no acute change and encephalomalacia in L frontal and L parietal region from prior craniotomy (unknown reason or diagnosis). Pt required mechanical ventilation for airway protection.  Pt was subsequently extubated, focal deficits resolved and transferred to hospital floors.  Pt currently on Novolog 3 units pre-meals, noted to have hyperglycemia.  Pt was consulted with endocrinology for management of Diabetes.            Interval HPI:   Pt is AAOX3 back to baseline mental status.  Yesterday started on Levemir 6 units qhs.  Glucose trend (140- 250s) Above goal.  Pt tolerating 100% of PO diet,  No hypoglycemias since admission.      Overnight events: SANDIP   Eatin%  Nausea: No  Hypoglycemia and intervention: No  Fever: No  TPN and/or TF: No    BP (!) 193/81 (BP Location: Right  "arm, Patient Position: Lying)   Pulse 81   Temp 97.8 °F (36.6 °C) (Oral)   Resp 16   Ht 5' 10" (1.778 m)   Wt 69.6 kg (153 lb 7 oz)   SpO2 100%   BMI 22.02 kg/m²      Labs Reviewed and Include    Recent Labs   Lab 12/21/19  1356   *   CALCIUM 9.4   ALBUMIN 3.2*      K 4.0   CO2 20*      BUN 15   CREATININE 4.5*     Lab Results   Component Value Date    WBC 4.37 12/21/2019    HGB 8.9 (L) 12/21/2019    HCT 27.9 (L) 12/21/2019     (H) 12/21/2019     12/21/2019     No results for input(s): TSH, FREET4 in the last 168 hours.  Lab Results   Component Value Date    HGBA1C 7.5 (H) 12/11/2019       Nutritional status:   Body mass index is 22.02 kg/m².  Lab Results   Component Value Date    ALBUMIN 3.2 (L) 12/21/2019    ALBUMIN 3.0 (L) 12/20/2019    ALBUMIN 2.9 (L) 12/19/2019     No results found for: PREALBUMIN    Estimated Creatinine Clearance: 19.1 mL/min (A) (based on SCr of 4.5 mg/dL (H)).    Accu-Checks  Recent Labs     12/19/19  1148 12/19/19  1713 12/19/19  2158 12/20/19  0804 12/20/19  1149 12/20/19  1655 12/20/19  2126 12/21/19  1256 12/21/19  1728 12/21/19 2012   POCTGLUCOSE 244* 265* 286* 239* 214* 240* 302* 171* 258* 148*       Current Medications and/or Treatments Impacting Glycemic Control  Immunotherapy:    Immunosuppressants     None        Steroids:   Hormones (From admission, onward)    Start     Stop Route Frequency Ordered    12/16/19 2153  melatonin tablet 6 mg      -- Oral Nightly PRN 12/16/19 2053        Pressors:    Autonomic Drugs (From admission, onward)    None        Hyperglycemia/Diabetes Medications:   Antihyperglycemics (From admission, onward)    Start     Stop Route Frequency Ordered    12/22/19 0838  insulin aspart U-100 pen 0-5 Units      -- SubQ Before meals & nightly PRN 12/22/19 0738    12/21/19 2100  insulin detemir U-100 pen 6 Units      -- SubQ Nightly 12/21/19 1342    12/21/19 1130  insulin aspart U-100 pen 3 Units      -- SubQ 3 times daily " with meals 12/21/19 0910          ASSESSMENT and PLAN    * Altered mental status  -Resolved   -Likely Hypoglycemic encephalopathy presenting with AMS and Focal deficits   -Managed by primary team   -Avoid hypoglycemias       Type 2 diabetes mellitus  -DM2 Dx @ age 20   -A1C 7.5   -Home meds:  Glipizide 5mg BID   -Pt with diabetic nephropathy, PVD and retinopathy   -Glucose trend (140 - 250s) Above goal but Improving   -No hypoglycemias since admission   -Pt tolerating 100% of PO diet   -Yesterday started on Levemir 6 units qhs (Reduced dose due to hypoglycemia and ESRD)     Plan:   -Increase Levemir to 8 units qhs   -Increase Novolog to 4 units TID   -C/w Low dose correction   -FS qAC/HS2AM     Discharge:   -Likely Basal insulin and GLP-1 (Trulilicity 0.75mg SC qweekly) due to history of PAD   -DC glipizide       Hypoglycemia, coma  -Back to baseline mental status   -Likely 2/2 Glipizide and Insulin administered in ED in a patient with ESRD   -Avoid insulin stacking as it can precipitate hypoglycemias       ESRD (end stage renal disease) on dialysis  -Managed by nephrology   -Avoid insulin stacking as it can precipitate hypoglycemias         Dany Cui MD  Endocrinology  [facility]

## 2019-12-22 NOTE — ASSESSMENT & PLAN NOTE
-DM2 Dx @ age 20   -A1C 7.5   -Home meds:  Glipizide 5mg BID   -Pt with diabetic nephropathy, PVD and retinopathy   -Glucose trend (140 - 250s) Above goal but Improving   -No hypoglycemias since admission   -Pt tolerating 100% of PO diet   -Yesterday started on Levemir 6 units qhs (Reduced dose due to hypoglycemia and ESRD)     Plan:   -Increase Levemir to 8 units qhs   -Increase Novolog to 4 units TID   -C/w Low dose correction   -FS qAC/HS2AM     Discharge:   -Likely Basal insulin and GLP-1 (Trulilicity 0.75mg SC qweekly) due to history of PAD   -DC glipizide

## 2019-12-22 NOTE — PROGRESS NOTES
"              [facility] Hospital Medicine  Progress Note    Team: [facility] HOSP MED N   Attending MD: Milana Xavier MD  Admit Date: 12/11/2019  [place]   Length of Stay:  LOS: 11 days   Code status: Full Code    Principal Problem: Altered mental status    Interval hx: alert and interactive today; now telling his brother he will go to rehab but on my exam is saying rehab is too expensive and he will go home tomorrow.  Brother and family at bedside.      ROS:  Constitutional: no fever or chills  Respiratory: no cough or shortness of breath  Cardiovascular: no chest pain or palpitations  Gastrointestinal: no nausea or vomiting, no abdominal pain; last BM: 12/21  Genitourinary: no hematuria or dysuria  Integument/Breast: no rash or pruritis  Musculoskeletal: no arthralgias or myalgias        Physical Exam  Temp:  [97.6 °F (36.4 °C)-98.2 °F (36.8 °C)] 97.8 °F (36.6 °C)  Pulse:  [64-84] 81  Resp:  [16-20] 16  SpO2:  [86 %-100 %] 100 %  BP: (131-194)/(60-88) 193/81    No intake or output data in the 24 hours ending 12/22/19 1143    Wt Readings from Last 1 Encounters:   12/18/19 0400 69.6 kg (153 lb 7 oz)   12/12/19 1205 77.6 kg (171 lb)   12/11/19 2217 77.6 kg (171 lb 1.2 oz)   12/11/19 1336 77.6 kg (171 lb)        Estimated body mass index is 22.02 kg/m² as calculated from the following:    Height as of this encounter: 5' 10" (1.778 m).    Weight as of this encounter: 69.6 kg (153 lb 7 oz).    General: well developed, well nourished, no distress  Lungs:  clear to auscultation bilaterally and normal respiratory effort.  Cardiovascular: Heart: regular rate and rhythm, S1, S2 normal, no murmur, click, rub or gallop. Chest Wall: no tenderness. Extremities: no cyanosis, no edema, no clubbing. + bruit to left UE  Abdomen/Rectal: Abdomen: soft, non-tender non-distended; bowel sounds normal  Skin: Skin color, texture, turgor normal. No rashes or lesions.  Neurologic: Normal strength and tone. No focal numbness " or weakness.  Psych/Behavioral:  Alert/awake and appropriate; O X 3      Recent Results (from the past 24 hour(s))   POCT glucose    Collection Time: 12/21/19 12:56 PM   Result Value Ref Range    POCT Glucose 171 (H) 70 - 110 mg/dL   Renal function panel    Collection Time: 12/21/19  1:56 PM   Result Value Ref Range    Glucose 185 (H) 70 - 110 mg/dL    Sodium 138 136 - 145 mmol/L    Potassium 4.0 3.5 - 5.1 mmol/L    Chloride 107 95 - 110 mmol/L    CO2 20 (L) 23 - 29 mmol/L    BUN, Bld 15 6 - 20 mg/dL    Calcium 9.4 8.7 - 10.5 mg/dL    Creatinine 4.5 (H) 0.5 - 1.4 mg/dL    Albumin 3.2 (L) 3.5 - 5.2 g/dL    Phosphorus 2.7 2.7 - 4.5 mg/dL    eGFR if  16.3 (A) >60 mL/min/1.73 m^2    eGFR if non  14.1 (A) >60 mL/min/1.73 m^2    Anion Gap 11 8 - 16 mmol/L   CBC auto differential    Collection Time: 12/21/19  1:56 PM   Result Value Ref Range    WBC 4.37 3.90 - 12.70 K/uL    RBC 2.65 (L) 4.60 - 6.20 M/uL    Hemoglobin 8.9 (L) 14.0 - 18.0 g/dL    Hematocrit 27.9 (L) 40.0 - 54.0 %    Mean Corpuscular Volume 105 (H) 82 - 98 fL    Mean Corpuscular Hemoglobin 33.6 (H) 27.0 - 31.0 pg    Mean Corpuscular Hemoglobin Conc 31.9 (L) 32.0 - 36.0 g/dL    RDW 13.4 11.5 - 14.5 %    Platelets 205 150 - 350 K/uL    MPV 10.0 9.2 - 12.9 fL    Immature Granulocytes 0.9 (H) 0.0 - 0.5 %    Gran # (ANC) 2.8 1.8 - 7.7 K/uL    Immature Grans (Abs) 0.04 0.00 - 0.04 K/uL    Lymph # 1.0 1.0 - 4.8 K/uL    Mono # 0.4 0.3 - 1.0 K/uL    Eos # 0.1 0.0 - 0.5 K/uL    Baso # 0.03 0.00 - 0.20 K/uL    nRBC 0 0 /100 WBC    Gran% 64.3 38.0 - 73.0 %    Lymph% 22.9 18.0 - 48.0 %    Mono% 8.0 4.0 - 15.0 %    Eosinophil% 3.2 0.0 - 8.0 %    Basophil% 0.7 0.0 - 1.9 %    Differential Method Automated    POCT glucose    Collection Time: 12/21/19  5:28 PM   Result Value Ref Range    POCT Glucose 258 (H) 70 - 110 mg/dL   POCT glucose    Collection Time: 12/21/19  8:12 PM   Result Value Ref Range    POCT Glucose 148 (H) 70 - 110 mg/dL    Vancomycin, random    Collection Time: 12/22/19  6:03 AM   Result Value Ref Range    Vancomycin, Random 15.5 Not established ug/mL       Recent Labs   Lab 12/19/19 0443 12/20/19  0452 12/21/19  1356   WBC 5.19  5.12 5.28 4.37   HGB 8.3*  8.2* 7.8* 8.9*   HCT 25.9*  25.8* 25.0* 27.9*     181 179 205       Recent Labs   Lab 12/18/19 0431 12/19/19 0443 12/20/19 0452 12/21/19  1356    140 137 138   K 3.6 4.1 4.5 4.0    106 103 107   CO2 24 24 26 20*   BUN 32* 20 33* 15   CREATININE 7.9* 5.9* 8.2* 4.5*   * 218* 247* 185*   CALCIUM 8.0* 8.3* 8.0* 9.4   MG 2.3 2.1 2.4  --    PHOS 5.3* 3.7 4.3 2.7       Recent Labs   Lab 12/18/19 0431 12/19/19 0443 12/20/19 0452 12/21/19  1356   ALKPHOS 84 86 77  --    ALT 36 48* 34  --    AST 37 43* 24  --    ALBUMIN 2.8* 2.9* 3.0* 3.2*   PROT 5.7* 6.2 6.1  --    BILITOT 0.4 0.5 0.4  --        Recent Labs   Lab 12/20/19  1149 12/20/19  1655 12/20/19  2126 12/21/19  1256 12/21/19  1728 12/21/19 2012   POCTGLUCOSE 214* 240* 302* 171* 258* 148*       Hemoglobin A1C   Date Value Ref Range Status   12/11/2019 7.5 (H) 4.0 - 5.6 % Final     Comment:     ADA Screening Guidelines:  5.7-6.4%  Consistent with prediabetes  >or=6.5%  Consistent with diabetes  High levels of fetal hemoglobin interfere with the HbA1C  assay. Heterozygous hemoglobin variants (HbS, HgC, etc)do  not significantly interfere with this assay.   However, presence of multiple variants may affect accuracy.         Scheduled Meds:   sodium chloride 0.9%   Intravenous Once    atorvastatin  40 mg Oral Daily    cloNIDine  0.1 mg Oral BID    heparin (porcine)  5,000 Units Subcutaneous Q8H    hydrALAZINE  50 mg Oral Q8H    insulin aspart U-100  4 Units Subcutaneous TIDWM    insulin detemir U-100  8 Units Subcutaneous QHS    NIFEdipine  60 mg Oral Daily    pantoprazole  40 mg Oral Daily    polyethylene glycol  17 g Oral Daily    QUEtiapine  25 mg Oral QHS    senna-docusate 8.6-50 mg  1  tablet Oral BID    sevelamer carbonate  0.8 g Oral TID WM       Continuous Infusions:    As Needed: acetaminophen, albuterol sulfate, Dextrose 10% Bolus, Dextrose 10% Bolus, dextrose 50%, famotidine, glucagon (human recombinant), glucose, glucose, haloperidol lactate, hydrALAZINE, hydrALAZINE, insulin aspart U-100, melatonin, senna-docusate 8.6-50 mg, sodium chloride 0.9%, Pharmacy to dose Vancomycin consult **AND** vancomycin - pharmacy to dose    Active Hospital Problems    Diagnosis  POA    *Altered mental status [R41.82]  Yes    Impaired mobility and ADLs [Z74.09]  No    MRSA (methicillin resistant Staphylococcus aureus) infection [A49.02]  Yes    Agitation [R45.1]  Unknown    Essential hypertension [I10]  Yes    ESRD (end stage renal disease) on dialysis [N18.6, Z99.2]  Not Applicable    Type 2 diabetes mellitus [E11.9]  Yes    Hypoglycemia, coma [E15]  Yes    Acute respiratory failure with hypoxia [J96.01]  Yes    Hyperkalemia [E87.5]  Yes    Metabolic acidosis [E87.2]  Yes    Hypoglycemia [E16.2]  Yes    Acute encephalopathy [G93.40]  Yes      Resolved Hospital Problems    Diagnosis Date Resolved POA    On mechanically assisted ventilation [Z99.11] 12/15/2019 Not Applicable         Assessment and Plan    Altered mental status due to hypoglycemia    49 year old male with HTN, DM2 and ESRD on HD who presented to OSH with acute onset L sided weakness and confusion.   - CT Head without at OSH unremarkable for acute infarction or hemorrhage   - CTA Head/neck - no acute change, no high grade stenosis  - SBP <200 mmHg  - EKG NSR , ECHO EF 53%  - SSI  - Q4 POCT Glucose checks  - MRI showed no acute intracranial abnormalities   12/17/2019: had an episode of hypoglycemia on 12/15 and glucoses > 150 prior; not on diabetic diet; maintain glucoses > 70 to ensure hypoglycemia not causing behavior changes; repeat CT as patient's sister states this behavior is similar to previous when he had head  injury     Dysphagia    - advanced to diabetic/renal diet with thin liquids on 12/18  - Nepro tid    Agitation  Agitation 2/2 metabolic encephalopathy   -d/c EEG   -acute agitation with correction of glucose   - required fentanyl gtt, precedex in NCC  - recurrence of agitation/confusion on 12/16 with improvement after haldol/ativan  - continue scheduled quetiapine 50 BID; will slowly wean if stable on 12/20     MRSA infection -  in sputum  Started on zosyn, de-escalated to ceftriaxone 12/15  12/15 -felt to be colonization, but pt was on a ventilator.  willl treat with vanc w HD x 2 weeks - end date 12/30     Essential hypertension  - Hypertensive on admit,   - home regimen with nifedipine, hydralazine, clonidine resumed with good result; resume losartan nightly  - hydralazine 25 q 8 hours prn SBP > 180     ESRD (end stage renal disease) on dialysis  - HD fistula in [facility]  - Nephrology following   - Renally dose medications  - Strict I/Os   - HD 12/16, scheduled MWF   -he has HD set up in [place] for planned travel     Hypoglycemia, coma  - Present on admission  - OSH attempted to shift HyperK with insulin    - On arrival, pt with glucose <5  - D10 drip gtt stopped (12/12); resume as indicated  - on 12/12 Glucose (248)  - POCT glucose q4 hour and SSI   - Monitor CMP     Type 2 diabetes mellitus  - Home glipizide held; likely related to hypoglycemia  - on admit glucose <5, started on D10 drip d/t hypoglycemia   - D10 gtt stopped (12/12)   - (12/13) Glucose 131  - on low dose SSNI, POCT glucose Q AC and HS  -diabetic diet on 12/19 due to glucoses are rising  -increase SSNI to moderate dose; will have endocrine evaluate for safest home medication  -appreciate endocrine evaluation; continue levemir with novolog now and trulicity and levemir at home      Diet: Diet diabetic [facility]; 2000 Calorie; Renal; Thin   DVT PPx:   VTE Risk Mitigation (From admission, onward)         Ordered     heparin (porcine)  injection 5,000 Units  Every 8 hours      12/12/19 0949     IP VTE LOW RISK PATIENT  Once      12/11/19 1520     Place sequential compression device  Until discontinued      12/11/19 1520                Does patient have Capacity?  Yes  Goals- resume HD, pt repeated that he wants to die to nurses on 12/16 - new per sister; denies depression or having suicidal ideation on 12/19  Code Status- FULL  Pain management- well controlled  Prognosis-  fair  Family discussions-   12/17 - with sister who has not been familiar with his medical history recently but knew about trauma with brain injury in [place]; he is appropriate when they converse by phone.  12/21 - long discussion with patient and brother; patient refuses inpatient rehab and insists on travel to [place] on 12/20   Functional Status - PT/OT  Inpatient rehab recommended; will order rolling walker if patient goes home for safety.     Discharge: Inpatient rehab recommended but patient wishes to go home for travel; discharge once medically stable with insulin regimen; will need to verify patient has part D Medicare as he states to pay for trulicity; endocrine will change regimen if necessary    Home 12/23 if glucoses stable on insulin regimen.     Milana Xavier MD  [facility] Medicine  [facility]  [phone]

## 2019-12-22 NOTE — SUBJECTIVE & OBJECTIVE
"Interval HPI:   Pt is AAOX3 back to baseline mental status.  Yesterday started on Levemir 6 units qhs.  Glucose trend (140- 250s) Above goal.  Pt tolerating 100% of PO diet,  No hypoglycemias since admission.      Overnight events: SANDIP   Eatin%  Nausea: No  Hypoglycemia and intervention: No  Fever: No  TPN and/or TF: No    BP (!) 193/81 (BP Location: Right arm, Patient Position: Lying)   Pulse 81   Temp 97.8 °F (36.6 °C) (Oral)   Resp 16   Ht 5' 10" (1.778 m)   Wt 69.6 kg (153 lb 7 oz)   SpO2 100%   BMI 22.02 kg/m²     Labs Reviewed and Include    Recent Labs   Lab 19  1356   *   CALCIUM 9.4   ALBUMIN 3.2*      K 4.0   CO2 20*      BUN 15   CREATININE 4.5*     Lab Results   Component Value Date    WBC 4.37 2019    HGB 8.9 (L) 2019    HCT 27.9 (L) 2019     (H) 2019     2019     No results for input(s): TSH, FREET4 in the last 168 hours.  Lab Results   Component Value Date    HGBA1C 7.5 (H) 2019       Nutritional status:   Body mass index is 22.02 kg/m².  Lab Results   Component Value Date    ALBUMIN 3.2 (L) 2019    ALBUMIN 3.0 (L) 2019    ALBUMIN 2.9 (L) 2019     No results found for: PREALBUMIN    Estimated Creatinine Clearance: 19.1 mL/min (A) (based on SCr of 4.5 mg/dL (H)).    Accu-Checks  Recent Labs     19  1148 19  1713 19  2158 19  0804 19  1149 19  1655 19  2126 19  1256 19  1728 19   POCTGLUCOSE 244* 265* 286* 239* 214* 240* 302* 171* 258* 148*       Current Medications and/or Treatments Impacting Glycemic Control  Immunotherapy:    Immunosuppressants     None        Steroids:   Hormones (From admission, onward)    Start     Stop Route Frequency Ordered    19  melatonin tablet 6 mg      -- Oral Nightly PRN 19        Pressors:    Autonomic Drugs (From admission, onward)    None        Hyperglycemia/Diabetes " Medications:   Antihyperglycemics (From admission, onward)    Start     Stop Route Frequency Ordered    12/22/19 0838  insulin aspart U-100 pen 0-5 Units      -- SubQ Before meals & nightly PRN 12/22/19 0738    12/21/19 2100  insulin detemir U-100 pen 6 Units      -- SubQ Nightly 12/21/19 1342    12/21/19 1130  insulin aspart U-100 pen 3 Units      -- SubQ 3 times daily with meals 12/21/19 0910

## 2019-12-23 VITALS
TEMPERATURE: 98 F | OXYGEN SATURATION: 99 % | HEART RATE: 69 BPM | WEIGHT: 151.44 LBS | BODY MASS INDEX: 21.68 KG/M2 | SYSTOLIC BLOOD PRESSURE: 164 MMHG | DIASTOLIC BLOOD PRESSURE: 73 MMHG | HEIGHT: 70 IN | RESPIRATION RATE: 20 BRPM

## 2019-12-23 LAB
ALBUMIN SERPL BCP-MCNC: 3 G/DL (ref 3.5–5.2)
ALP SERPL-CCNC: 87 U/L (ref 55–135)
ALT SERPL W/O P-5'-P-CCNC: 37 U/L (ref 10–44)
ANION GAP SERPL CALC-SCNC: 9 MMOL/L (ref 8–16)
AST SERPL-CCNC: 25 U/L (ref 10–40)
BASOPHILS # BLD AUTO: 0.02 K/UL (ref 0–0.2)
BASOPHILS NFR BLD: 0.4 % (ref 0–1.9)
BILIRUB SERPL-MCNC: 0.3 MG/DL (ref 0.1–1)
BUN SERPL-MCNC: 49 MG/DL (ref 6–20)
CALCIUM SERPL-MCNC: 8 MG/DL (ref 8.7–10.5)
CHLORIDE SERPL-SCNC: 103 MMOL/L (ref 95–110)
CO2 SERPL-SCNC: 21 MMOL/L (ref 23–29)
CREAT SERPL-MCNC: 8.5 MG/DL (ref 0.5–1.4)
DIFFERENTIAL METHOD: ABNORMAL
EOSINOPHIL # BLD AUTO: 0.2 K/UL (ref 0–0.5)
EOSINOPHIL NFR BLD: 3.1 % (ref 0–8)
ERYTHROCYTE [DISTWIDTH] IN BLOOD BY AUTOMATED COUNT: 13.4 % (ref 11.5–14.5)
EST. GFR  (AFRICAN AMERICAN): 7.5 ML/MIN/1.73 M^2
EST. GFR  (NON AFRICAN AMERICAN): 6.5 ML/MIN/1.73 M^2
GLUCOSE SERPL-MCNC: 237 MG/DL (ref 70–110)
HCT VFR BLD AUTO: 23.1 % (ref 40–54)
HGB BLD-MCNC: 7.4 G/DL (ref 14–18)
IMM GRANULOCYTES # BLD AUTO: 0.04 K/UL (ref 0–0.04)
IMM GRANULOCYTES NFR BLD AUTO: 0.7 % (ref 0–0.5)
LYMPHOCYTES # BLD AUTO: 1.2 K/UL (ref 1–4.8)
LYMPHOCYTES NFR BLD: 21 % (ref 18–48)
MAGNESIUM SERPL-MCNC: 2.5 MG/DL (ref 1.6–2.6)
MCH RBC QN AUTO: 33.2 PG (ref 27–31)
MCHC RBC AUTO-ENTMCNC: 32 G/DL (ref 32–36)
MCV RBC AUTO: 104 FL (ref 82–98)
MONOCYTES # BLD AUTO: 0.5 K/UL (ref 0.3–1)
MONOCYTES NFR BLD: 9.9 % (ref 4–15)
NEUTROPHILS # BLD AUTO: 3.6 K/UL (ref 1.8–7.7)
NEUTROPHILS NFR BLD: 64.9 % (ref 38–73)
NRBC BLD-RTO: 0 /100 WBC
PHOSPHATE SERPL-MCNC: 4.7 MG/DL (ref 2.7–4.5)
PLATELET # BLD AUTO: 195 K/UL (ref 150–350)
PMV BLD AUTO: 10.9 FL (ref 9.2–12.9)
POCT GLUCOSE: 206 MG/DL (ref 70–110)
POCT GLUCOSE: 293 MG/DL (ref 70–110)
POTASSIUM SERPL-SCNC: 4.7 MMOL/L (ref 3.5–5.1)
PROT SERPL-MCNC: 6 G/DL (ref 6–8.4)
RBC # BLD AUTO: 2.23 M/UL (ref 4.6–6.2)
SODIUM SERPL-SCNC: 133 MMOL/L (ref 136–145)
VANCOMYCIN SERPL-MCNC: 24.1 UG/ML
WBC # BLD AUTO: 5.48 K/UL (ref 3.9–12.7)

## 2019-12-23 PROCEDURE — 99238 HOSP IP/OBS DSCHRG MGMT 30/<: CPT | Mod: ,,, | Performed by: INTERNAL MEDICINE

## 2019-12-23 PROCEDURE — 83735 ASSAY OF MAGNESIUM: CPT

## 2019-12-23 PROCEDURE — 94761 N-INVAS EAR/PLS OXIMETRY MLT: CPT

## 2019-12-23 PROCEDURE — 25000003 PHARM REV CODE 250: Performed by: INTERNAL MEDICINE

## 2019-12-23 PROCEDURE — 63600175 PHARM REV CODE 636 W HCPCS: Performed by: PSYCHIATRY & NEUROLOGY

## 2019-12-23 PROCEDURE — 84100 ASSAY OF PHOSPHORUS: CPT

## 2019-12-23 PROCEDURE — 99238 PR HOSPITAL DISCHARGE DAY,<30 MIN: ICD-10-PCS | Mod: ,,, | Performed by: INTERNAL MEDICINE

## 2019-12-23 PROCEDURE — 80202 ASSAY OF VANCOMYCIN: CPT

## 2019-12-23 PROCEDURE — 85025 COMPLETE CBC W/AUTO DIFF WBC: CPT

## 2019-12-23 PROCEDURE — 36415 COLL VENOUS BLD VENIPUNCTURE: CPT

## 2019-12-23 PROCEDURE — 25000003 PHARM REV CODE 250: Performed by: HOSPITALIST

## 2019-12-23 PROCEDURE — 27000221 HC OXYGEN, UP TO 24 HOURS

## 2019-12-23 PROCEDURE — 97535 SELF CARE MNGMENT TRAINING: CPT

## 2019-12-23 PROCEDURE — 80053 COMPREHEN METABOLIC PANEL: CPT

## 2019-12-23 RX ORDER — PEN NEEDLE, DIABETIC 29 G X1/2"
30 NEEDLE, DISPOSABLE MISCELLANEOUS NIGHTLY
Qty: 100 EACH | Refills: 0 | Status: SHIPPED | OUTPATIENT
Start: 2019-12-23

## 2019-12-23 RX ADMIN — ATORVASTATIN CALCIUM 40 MG: 20 TABLET, FILM COATED ORAL at 08:12

## 2019-12-23 RX ADMIN — POLYETHYLENE GLYCOL (3350) 17 G: 17 POWDER, FOR SOLUTION ORAL at 08:12

## 2019-12-23 RX ADMIN — HYDRALAZINE HYDROCHLORIDE 50 MG: 50 TABLET, FILM COATED ORAL at 08:12

## 2019-12-23 RX ADMIN — PANTOPRAZOLE SODIUM 40 MG: 40 TABLET, DELAYED RELEASE ORAL at 08:12

## 2019-12-23 RX ADMIN — HEPARIN SODIUM 5000 UNITS: 5000 INJECTION, SOLUTION INTRAVENOUS; SUBCUTANEOUS at 06:12

## 2019-12-23 RX ADMIN — INSULIN ASPART 4 UNITS: 100 INJECTION, SOLUTION INTRAVENOUS; SUBCUTANEOUS at 08:12

## 2019-12-23 RX ADMIN — CLONIDINE HYDROCHLORIDE 0.1 MG: 0.1 TABLET ORAL at 08:12

## 2019-12-23 RX ADMIN — SENNOSIDES AND DOCUSATE SODIUM 1 TABLET: 8.6; 5 TABLET ORAL at 08:12

## 2019-12-23 RX ADMIN — NIFEDIPINE 60 MG: 60 TABLET, FILM COATED, EXTENDED RELEASE ORAL at 08:12

## 2019-12-23 NOTE — PLAN OF CARE
Pt remains free from falls and injury. Pt makes statement of no pain. Remains on contact precautions. Bed low and locked, call light within reach.

## 2019-12-23 NOTE — PROGRESS NOTES
"  [facility]  Adult Nutrition  Consult Note    SUMMARY     Recommendations    1. Continue current Diabetic, Renal diet + Novasource ONS.  2. RD to monitor & follow-up.    Goals: 1.) Pt to consume/tolerate >50% of meals by follow up. 2.) Pt to achieve/tolerate >75% EEN and EPN by follow up.   Nutrition Goal Status: goal met  Communication of RD Recs: reviewed with RN    Reason for Assessment    Reason For Assessment: RD follow-up  Diagnosis: stroke/CVA  Relevant Medical History: HTN, DM2, ESRD  Interdisciplinary Rounds: did not attend    General Information Comments: Pt w/ improving appetite, consuming % of meals, tolerating diet. Pt on HD. TF discontinued. NFPE completed 12/12 with mild wasting observed. Wt fairly stable since last RD visit. At this time, pt does not meet malnutrition criteria. Pt scheduled to discharge today.   Nutrition Discharge Planning: Adequate intake to meet nutritional needs.     Nutrition/Diet History    Spiritual, Cultural Beliefs, Evangelical Practices, Values that Affect Care: no  Factors Affecting Nutritional Intake: None identified at this time    Anthropometrics    Temp: 97.9 °F (36.6 °C)  Height: 5' 10" (177.8 cm)  Height (inches): 70 in  Weight Method: Bed Scale  Weight: 68.7 kg (151 lb 7.3 oz)  Weight (lb): 151.46 lb  Ideal Body Weight (IBW), Male: 166 lb  % Ideal Body Weight, Male (lb): 91.24 %  BMI (Calculated): 21.7  BMI Grade: 18.5-24.9 - normal    Lab/Procedures/Meds    Pertinent Labs Reviewed: reviewed  Pertinent Labs Comments: Na 133, BUN 49, Creat 8.5, GFR 8.5, P 4.7  Pertinent Medications Reviewed: reviewed  Pertinent Medications Comments: -    Estimated/Assessed Needs    Weight Used For Calorie Calculations: 68.7 kg (151 lb 7.3 oz)     Energy Calorie Requirements (kcal): 1935 kcal/d  Energy Need Method: [place]-St Jeor(x 1.25 PAL)     Protein Requirements: 83 g/d (1.2 g/kg)  Weight Used For Protein Calculations: 68.7 kg (151 lb 7.3 oz) "     Estimated Fluid Requirement Method: RDA Method(or per MD)    Nutrition Prescription Ordered    Current Diet Order: Diabetic, Renal  Oral Nutrition Supplement: Novasource Renal     Evaluation of Received Nutrient/Fluid Intake    Comments: LBM: 12/22    Tolerance: tolerating    Nutrition Risk    Level of Risk/Frequency of Follow-up: (1x/week)     Assessment and Plan    Nutrition Problem  Inadequate energy intake      Related to (etiology):   Poor appetite     Signs and Symptoms (as evidenced by):   Pt consuming <75% of nutritional needs     Interventions/Recommendations (treatment strategy):  Collaboration of nutrition care with other providers  Modified diet-puree  Supplemental beverage-Novasource Renal  Vitamins-MVI  Medications-lactobacillus     Nutrition Diagnosis Status:   Revised 12/17/19; Improving     Monitor and Evaluation    Food and Nutrient Intake: energy intake, food and beverage intake, enteral nutrition intake  Food and Nutrient Adminstration: diet order, enteral and parenteral nutrition administration  Knowledge/Beliefs/Attitudes: food and nutrition knowledge/skill  Physical Activity and Function: nutrition-related ADLs and IADLs  Anthropometric Measurements: weight, weight change, body mass index  Biochemical Data, Medical Tests and Procedures: electrolyte and renal panel, gastrointestinal profile, glucose/endocrine profile  Nutrition-Focused Physical Findings: overall appearance     Malnutrition Assessment    Upper Arm Region (Subcutaneous Fat Loss): (VINEET due to swelling)   [place] Region (Muscle Loss): well nourished  Clavicle Bone Region (Muscle Loss): well nourished  Clavicle and Acromion Bone Region (Muscle Loss): well nourished  Scapular Bone Region (Muscle Loss): well nourished  Dorsal Hand (Muscle Loss): (VINEET due to swelling)  Patellar Region (Muscle Loss): mild depletion  Posterior Calf Region (Muscle Loss): mild depletion     Nutrition Follow-Up    RD Follow-up?: Yes

## 2019-12-23 NOTE — PLAN OF CARE
Sitting up on side of bed conversing with family. AAO x4. Agrees to follow discharge instructions. Taught pt how to use Trulicity pen along with family. Expressed understanding. Denies pain and discomfort, breathing even and unlabored.

## 2019-12-23 NOTE — SIGNIFICANT EVENT
Discharge order placed with qualifier, when assessed by Attending.  Patient discharged before assessed by Attending and prior to medication reconciliation completion. Reconciliation now done.  Charge nurse to ensure patient receives correct AVS.

## 2019-12-23 NOTE — PLAN OF CARE
Endocrinology following for management of diabetes.    Glucose better today after increase in insulin. Patient already received education for administration of insulin and Trulicity. Plan to discharge today. Patient plans to travel to [place] for the holidays.    Plan:  - Continue insulin Levemir 8 U daily  - Continue insulin Novolog 4 before every meal  - Start Trulicity 0.75 mg SQ weekly  - BG checks [facility]  - Inpatient glucose goal 140-180 mg/dL    Truman Noriega MD  Endocrinology Fellow

## 2019-12-23 NOTE — PLAN OF CARE
D/C orders noted. Pt. Will d/c home. Pt. kee he will get dialysis this afternoon at 1pm. No other needs.

## 2019-12-23 NOTE — PT/OT/SLP PROGRESS
Occupational Therapy   Treatment    Name: Balta Lamb  MRN: [id number]  Admitting Diagnosis:  Altered mental status       Recommendations:     Discharge Recommendations: rehabilitation facility  Discharge Equipment Recommendations:  bath bench  Barriers to discharge:  Inaccessible home environment    Assessment:     Balta Lamb is a 49 y.o. male with a medical diagnosis of Altered mental status.  He presents supine and willing to participate.  Pt with SBA for bed mobility for bathroom access.  Standing at sink for oral care. Toileting with SB.    Performance deficits affecting function are weakness, impaired endurance, impaired self care skills, impaired functional mobilty, gait instability, impaired balance, visual deficits, impaired cognition, decreased coordination.     Rehab Prognosis:  Good; patient would benefit from acute skilled OT services to address these deficits and reach maximum level of function.       Plan:     Patient to be seen 4 x/week to address the above listed problems via self-care/home management, therapeutic activities, therapeutic exercises  · Plan of Care Expires: 01/09/20  · Plan of Care Reviewed with: patient    Subjective     Pain/Comfort:  ·      Objective:     Communicated with: RN prior to session.  Patient found supine with telemetry upon OT entry to room.    General Precautions: Standard, fall   Orthopedic Precautions:N/A   Braces: N/A     Occupational Performance:     Bed Mobility:    · SBA for bed mobility     Functional Mobility/Transfers:  · Functional Mobility: Pt with CGA for safe in room and bathroom     Activities of Daily Living:  · Standing at sink for oral care with SBA      [facility] 6 Click ADL: 22    Treatment & Education:  Pt educated on safety, role of OT, importance of increased participation in self care for gains , expectations for participation, expectations for gains, POC, energy conservation, caregiver strain. White board updated.   - ADL training  - transfer  training     Patient left supine with all lines intactEducation:      GOALS:   Multidisciplinary Problems     Occupational Therapy Goals        Problem: Occupational Therapy Goal    Goal Priority Disciplines Outcome Interventions   Occupational Therapy Goal     OT, PT/OT Ongoing, Progressing    Description:  Goals set 12/15 to be addressed for 14 days with expiration date, 12/29:  Patient will increase functional independence with ADLs by performing:    Patient will demonstrate rolling to the right with supervision.     Patient will demonstrate rolling to the left with supervision.  Patient will demonstrate supine -sit with supervision.     Patient will demonstrate stand pivot transfers with min assist.     Patient will demonstrate grooming while seated with setup (A).  Patient will demonstrate upper body dressing with min assist while seated EOB.   Progressing 12/17  Patient will demonstrate lower body dressing with min assist while seated EOB.     Patient will demonstrate toileting with min assist.     Patient will demonstrate bathing while seated EOB with min assist.     Patient's family / caregiver will demonstrate independence and safety with assisting patient with self-care skills and functional mobility.                               Time Tracking:     OT Date of Treatment: 12/23/19  OT Start Time: 0715  OT Stop Time: 0740  OT Total Time (min): 25 min    Billable Minutes:Self Care/Home Management 25    Stephanie Weinberg, KALYN  12/23/2019

## 2019-12-23 NOTE — PLAN OF CARE
12/23/19 0956   Final Note   Assessment Type Final Discharge Note   Anticipated Discharge Disposition Home     Pt is discharging home with family and no needs.    Tara Lynn LMSW  [facility]  [id number]

## 2019-12-25 ENCOUNTER — PATIENT OUTREACH (OUTPATIENT)
Dept: ADMINISTRATIVE | Facility: CLINIC | Age: 49
End: 2019-12-25

## 2019-12-25 NOTE — PATIENT INSTRUCTIONS
Confusion  Confusion is a change in a persons ability to think clearly. There may be trouble recognizing familiar people and places or knowing what day it is. Memory, judgment, and decision-making may also be affected. In severe cases, the person may have limited or no response to being spoken to.  Confusion is usually a sign of an underlying problem. It may occur suddenly. Or it may develop gradually over time. Causes of confusion include brain injury, medicines, alcohol, withdrawal from certain medicines or illegal drugs, and infection. Heart attack and stroke may cause it. Confusion can also be a sign of dementia or a mental illness.  Treatment will depend on the cause of the problem. If the issue is a medicine, stopping the medicine may help. The healthcare provider will perform an evaluation and certain tests may be done. Follow up with the healthcare provider for the results.  Home care  · Be sure someone is with the confused person at all times. He or she should not be left alone or unsupervised.  · Tell the healthcare provider about all medicines that the person takes. These include prescription, over-the-counter, herbs, and supplements.  · Dehydration can increase confusion. Ask the healthcare provider how much fluid the person should be drinking. Offer liquids and ensure that they are taken.  · Keep all medicines in a secure place under the caregivers control. To prevent overdose, a confused person should take medicines only under the supervision of a caregiver.  · To help a person with confusion:  ¨ Establish a daily routine. Change can be a source of stress for someone with confusion. Make and keep a time schedule for common tasks such as bathing, dressing, taking medicines, meals, going for walks, shopping, naps and bed time.  ¨ Speak slowly and clearly with a gentle tone of voice. Use short simple words and sentences. Ask one question at a time. Do not interrupt, criticize or argue. Be calm and  supportive. Use friendly facial expressions. Use pointing and touching to help communicate. If there has been loss of long-term memory, do not ask questions about past events. This would only cause frustration for the person.  ¨ Use lists, signs, family photos, clocks and calendars as memory aids. Label cabinets and drawers. Try to distract, not confront, the patient. When he/she becomes frustrated or upset, redirect his/her attention to eating or some other activity of interest.  ¨ If this proves to be due to a permanent condition, talk to the healthcare provider or a  about getting a Power of  for healthcare and for financial decisions. It is best to do this while the person can still sign legal documents and make his or her own decisions. Otherwise, a court order will be required.  Follow-up care  Follow up with the person's healthcare provider or as advised for further testing or changes in medical care.  When to seek medical advice  Call the healthcare provider for any of the following:  · Frequent falling  · Refusal to eat or drink  · Violent behavior or behavior too difficult to manage at home  · New hallucinations or delusions  · Increased drowsiness  · Complaints of headache or numbness or weakness of the face, arm or leg  · Nausea or vomiting  · Slurred speech or trouble speaking, walking, or seeing  · Fainting spell, dizziness or seizure  · Unexplained fever over 100.4º F (38.0º C) or as directed by the healthcare provider  Date Last Reviewed: 8/23/2015  © 9172-0851 [employer]. [address] [place] [address], [place] [address]. All rights reserved. This information is not intended as a substitute for professional medical care. Always follow your healthcare professional's instructions.        Altered Level of Consciousness  Level of consciousness (LOC) is a measure of a persons ability to interact with other people and to react to the surroundings. A person with an altered level of  consciousness may not respond to touch or voices. He or she may look vacant or blank and may not make eye contact with others. He or she may be limp and may not move for a long time or show little interest in moving. He or she may also be confused.  There are many causes of altered LOC. They include low blood sugar, infection, medicines, injuries, or other medical problems.  Altered LOC is a medical emergency. The healthcare provider will do tests to help find the cause. These may include blood tests and imaging tests. The person is treated so breathing and heart rate are stable. An intravenous (IV) line may be put into a vein in the arm or hand to give medicines. Once the cause of altered LOC is found, the goal is to treat the cause.  In almost all cases, the person will be admitted to the hospital for observation.  Home care  When your loved one is released from the hospital, you will be given guidelines for caring for him or her. In general:  · Follow the healthcare provider's instructions for giving any prescribed medicines to your child.  · Stay with your loved one or have another responsible adult look after him or her. Watch carefully for any return of symptoms or changes in behavior.  · If the person has diabetes, make sure that any approved medicines are given on time and as prescribed.  Follow-up care  Follow up with the healthcare provider or our staff.  When to seek medical advice  Call the healthcare provider right away for any of the following:  · Symptoms of altered LOC return  · New symptoms appear  Date Last Reviewed: 8/23/2015  © 6957-7678 [employer]. [address] [place] [address], [place] [address]. All rights reserved. This information is not intended as a substitute for professional medical care. Always follow your healthcare professional's instructions.        Altered Level of Consciousness  Level of consciousness (LOC) is a measure of a persons ability to interact with other people  and to react to the surroundings. A person with an altered level of consciousness may not respond to touch or voices. He or she may look vacant or blank and may not make eye contact with others. He or she may be limp and may not move for a long time or show little interest in moving. He or she may also be confused.  There are many causes of altered LOC. They include low blood sugar, infection, medicines, injuries, or other medical problems.  Altered LOC is a medical emergency. The healthcare provider will do tests to help find the cause. These may include blood tests and imaging tests. The person is treated so breathing and heart rate are stable. An intravenous (IV) line may be put into a vein in the arm or hand to give medicines. Once the cause of altered LOC is found, the goal is to treat the cause.  In almost all cases, the person will be admitted to the hospital for observation.  Home care  When your loved one is released from the hospital, you will be given guidelines for caring for him or her. In general:  · Follow the healthcare provider's instructions for giving any prescribed medicines to your child.  · Stay with your loved one or have another responsible adult look after him or her. Watch carefully for any return of symptoms or changes in behavior.  · If the person has diabetes, make sure that any approved medicines are given on time and as prescribed.  Follow-up care  Follow up with the healthcare provider or our staff.  When to seek medical advice  Call the healthcare provider right away for any of the following:  · Symptoms of altered LOC return  · New symptoms appear  Date Last Reviewed: 8/23/2015  © 1373-9267 [employer]. [address] [place] [address], [place] [address]. All rights reserved. This information is not intended as a substitute for professional medical care. Always follow your healthcare professional's instructions.

## 2019-12-31 NOTE — DISCHARGE SUMMARY
"[facility] Hospital Medicine  Discharge Summary      Patient Name: Balta Lamb  MRN: [id number]  Admission Date: 12/11/2019  Hospital Length of Stay: 12 days  Discharge Date and Time: 12/23/2019 10:58 AM  Attending Physician: No att. providers found   Discharging Provider: Milana Xavier MD  Primary Care Provider: Malini Kelly MD    [facility] Medicine Team: [facility] HOSP MED N Milana Xavier MD    HPI:   Balta Lamb is a 49 year old male with a medical history significant for HTN, DM2, ESRD (unknown schedule or last HD date) on HD who presents as a transfer from OSH for neurologic evaluation of altered mental status and "L sided numbness". History is obtained from chart as pt is alone and sedated. Per chart, pt was driving with his wife when he noted acute onset left sided numbness. Wife was able to pull car over and call 911. EMS found pt confused and combative. Pt was transported to OSH where CT Head showed no acute change and encephalomalacia in L frontal and L parietal region from prior craniotomy (unknown reason or diagnosis). Routine labs showed a K of 6.2 and Cr of 10.2. OSH ED attempted to shift the pts K with insulin. Pt was started on a Versed infusion and intubated at OSH prior to transfer to [facility] for further evaluation. On arrival, pts glucose was noted to be <20 and K was 3.4. CTA Multiphase showed no acute hemorrhage or major vascular distribution infarct and no evidence high-grade stenosis or major vessel occlusion. Pt was started on D10 drip for hypoglycemia and will be admitted to [facility] for further evalaution and higher level of care.    * No surgery found *      Hospital Course:     Altered mental status due to hypoglycemia     49 year old male with HTN, DM2 and ESRD on HD who presented to OSH with acute onset L sided weakness and confusion.   - CT Head without at OSH unremarkable for acute infarction or hemorrhage   - CTA Head/neck - no acute change, no high grade " stenosis  - SBP <200 mmHg  - EKG NSR , ECHO EF 53%  - SSI  - Q4 POCT Glucose checks  - MRI showed no acute intracranial abnormalities   12/17/2019: had an episode of hypoglycemia on 12/15 and glucoses > 150 prior; not on diabetic diet; maintain glucoses > 70 to ensure hypoglycemia not causing behavior changes; repeated head CT (stable) as patient's sister states this behavior is similar to previous when he had head injury     Dysphagia    - advanced to diabetic/renal diet with thin liquids on 12/18  - Nepro tid     Agitation  Agitation 2/2 metabolic encephalopathy   -d/c EEG   -acute agitation with correction of glucose   - required fentanyl gtt, precedex in NCC  - recurrence of agitation/confusion on 12/16 with improvement after haldol/ativan  - continue scheduled quetiapine 50 BID; will slowly wean if stable on 12/20  -seroquel weaned to off prior to discharge     MRSA infection -  in sputum  Started on zosyn, de-escalated to ceftriaxone 12/15  12/15 -felt to be colonization, but pt was on a ventilator.  willl treat with vanc w HD x 2 weeks - end date 12/30     Essential hypertension  - Hypertensive on admit,   - home regimen with nifedipine, hydralazine, clonidine resumed with good result; resume losartan nightly  - hydralazine 25 q 8 hours prn SBP > 180     ESRD (end stage renal disease) on dialysis  - HD fistula in E  - Nephrology following   - Renally dose medications  - Strict I/Os   - HD 12/16, scheduled MWF   -he has HD set up in [place] for planned travel     Hypoglycemia, coma  - Present on admission  - OSH attempted to shift HyperK with insulin    - On arrival, pt with glucose <5  - D10 drip gtt stopped (12/12); resume as indicated  - on 12/12 Glucose (248)  - POCT glucose q4 hour and SSI   - Monitor CMP     Type 2 diabetes mellitus  - Home glipizide held; likely related to hypoglycemia  - on admit glucose <20, started on D10 drip d/t hypoglycemia   - D10 gtt stopped (12/12)   - (12/13)  Glucose 131  - on low dose SSNI, POCT glucose Q AC and HS  -diabetic diet on 12/19 due to glucoses are rising  -increase SSNI to moderate dose; will have endocrine evaluate for safest home medication  -appreciate endocrine evaluation; continue levemir with novolog now and trulicity and levemir at home    Debility  -patient recommended to attend inptatient rehab due unsteady gait and difficulty with ADL's; he refused for various reasons even though he repeatedly told his family he would go; patient elected transport home with his brother    Discharge order placed with qualifier, when assessed by Attending.  Patient discharged before assessed by Attending and prior to medication reconciliation completion. Reconciliation done.  Charge nurse did ensure patient received correct AVS and all meds were sent to appropriate pharmacy.    Consults:   Consults (From admission, onward)        Status Ordering Provider     Inpatient consult to Endocrinology  Once     Provider:  (Not yet assigned)    Completed RAFY RODRIGUEZ     Inpatient consult to Midline team  Once     Provider:  (Not yet assigned)    Completed NATALEE FITZPATRICK     Inpatient consult to Nephrology  Once     Provider:  (Not yet assigned)    Completed KRANTHI LUQUE     Inpatient consult to Physical Medicine Rehab  Once     Provider:  (Not yet assigned)    Completed KRANTHI LUQUE     Inpatient consult to PICC team ([facility])  Once     Provider:  (Not yet assigned)    Completed KRANTHI LUQUE     Inpatient consult to Registered Dietitian/Nutritionist  Once     Provider:  (Not yet assigned)    Completed KRANTHI LUQUE     Inpatient consult to Registered Dietitian/Nutritionist  Once     Provider:  (Not yet assigned)    Completed DANIELE GALEANA     Inpatient consult to Vascular (Stroke) Neurology  Once     Provider:  (Not yet assigned)    Completed JACK ASHLEY          Final Active Diagnoses:    Diagnosis Date Noted POA     "PRINCIPAL PROBLEM:  Altered mental status [R41.82] 12/11/2019 Yes    Impaired mobility and ADLs [Z74.09] 12/17/2019 No    MRSA (methicillin resistant Staphylococcus aureus) infection [A49.02]  Yes    Agitation [R45.1] 12/12/2019 Unknown    Essential hypertension [I10] 12/11/2019 Yes    ESRD (end stage renal disease) on dialysis [N18.6, Z99.2] 12/11/2019 Not Applicable    Type 2 diabetes mellitus [E11.9] 12/11/2019 Yes    Hypoglycemia, coma [E15] 12/11/2019 Yes    Acute respiratory failure with hypoxia [J96.01] 12/11/2019 Yes    Hyperkalemia [E87.5] 12/11/2019 Yes    Metabolic acidosis [E87.2] 12/11/2019 Yes    Hypoglycemia [E16.2] 12/11/2019 Yes    Acute encephalopathy [G93.40] 12/11/2019 Yes      Problems Resolved During this Admission:    Diagnosis Date Noted Date Resolved POA    On mechanically assisted ventilation [Z99.11] 12/12/2019 12/15/2019 Not Applicable      Discharged Condition: fair    Disposition: Home or Self Care    Follow Up:    Patient Instructions:   No discharge procedures on file.  Medications:  Reconciled Home Medications:      Medication List      START taking these medications    insulin detemir U-100 100 unit/mL (3 mL) Inpn pen  Commonly known as:  LEVEMIR FLEXTOUCH  Inject 8 Units into the skin every evening.     pen needle, diabetic 29 gauge x 1/2" Ndle  Use every evening.     Trulicity 0.75 mg/0.5 mL Pnij  Generic drug:  dulaglutide  Inject 0.5 mLs (0.75 mg total) into the skin every 7 days.        CONTINUE taking these medications    cloNIDine 0.1 MG tablet  Commonly known as:  CATAPRES  Take 0.1 mg by mouth 2 (two) times daily.     folic acid 1 MG tablet  Commonly known as:  FOLVITE  Take 1 mg by mouth once daily.     hydrALAZINE 100 MG tablet  Commonly known as:  APRESOLINE  Take 100 mg by mouth 2 (two) times daily.     LORazepam 0.5 MG tablet  Commonly known as:  ATIVAN  TAKE ONE TABLET BY MOUTH AS NEEDED BEFORE dialysis     losartan 100 MG tablet  Commonly known as:  " COZAAR  Take 100 mg by mouth once daily.     NIFEdipine 30 MG (OSM) 24 hr tablet  Commonly known as:  PROCARDIA-XL  Take 30 mg by mouth once daily.     Renvela 800 mg Tab  Generic drug:  sevelamer carbonate  TAKE TWO TABLETS BY MOUTH AFTER MEALS meals        STOP taking these medications    glipiZIDE 5 MG tablet  Commonly known as:  GLUCOTROL            Significant Diagnostic Studies: MRSA cultured from sputum on 12/11/2019    Pending Diagnostic Studies:     None        Indwelling Lines/Drains at time of discharge:   Lines/Drains/Airways     Drain                 Hemodialysis AV Fistula Left upper arm -- days                Time spent on the discharge of patient: 25 minutes  Patient was not seen or examined on the date of discharge due to nursing discharge prior to assessment.       Milana Xavier MD  Department of Hospital Medicine  [facility]

## 2021-01-01 ENCOUNTER — OUTSIDE PLACE OF SERVICE (OUTPATIENT)
Dept: CARDIOLOGY | Facility: CLINIC | Age: 51
End: 2021-01-01
Payer: MEDICARE

## 2021-01-01 PROCEDURE — 93010 PR ELECTROCARDIOGRAM REPORT: ICD-10-PCS | Mod: ,,, | Performed by: INTERNAL MEDICINE

## 2021-01-01 PROCEDURE — 93010 ELECTROCARDIOGRAM REPORT: CPT | Mod: ,,, | Performed by: INTERNAL MEDICINE

## 2022-01-26 PROBLEM — I62.9: Status: ACTIVE | Noted: 2022-01-01

## 2022-01-27 PROBLEM — I10 ESSENTIAL HYPERTENSION: Chronic | Status: ACTIVE | Noted: 2019-12-11
